# Patient Record
Sex: MALE | Race: WHITE | NOT HISPANIC OR LATINO | Employment: OTHER | URBAN - METROPOLITAN AREA
[De-identification: names, ages, dates, MRNs, and addresses within clinical notes are randomized per-mention and may not be internally consistent; named-entity substitution may affect disease eponyms.]

---

## 2017-01-11 ENCOUNTER — TELEPHONE (OUTPATIENT)
Dept: FAMILY MEDICINE | Facility: CLINIC | Age: 62
End: 2017-01-11

## 2017-01-11 NOTE — TELEPHONE ENCOUNTER
Patient is much improved and is lifting overhead with no problems and wants that restriction removed from his workability

## 2017-02-21 DIAGNOSIS — M17.0 PRIMARY OSTEOARTHRITIS OF BOTH KNEES: Primary | ICD-10-CM

## 2017-02-21 RX ORDER — CELECOXIB 200 MG/1
200 CAPSULE ORAL 2 TIMES DAILY PRN
Qty: 60 CAPSULE | Refills: 1 | Status: SHIPPED | OUTPATIENT
Start: 2017-02-21 | End: 2018-05-24

## 2017-02-22 DIAGNOSIS — M54.16 LUMBAR RADICULOPATHY: ICD-10-CM

## 2017-02-22 DIAGNOSIS — M54.12 CERVICAL RADICULITIS: ICD-10-CM

## 2017-02-22 RX ORDER — DULOXETIN HYDROCHLORIDE 60 MG/1
60 CAPSULE, DELAYED RELEASE ORAL DAILY
Qty: 90 CAPSULE | Refills: 1 | Status: CANCELLED | OUTPATIENT
Start: 2017-02-22

## 2017-02-22 NOTE — TELEPHONE ENCOUNTER
Medication listed as discontinued in Epic due to cost of medication.  Refill request states last fill was 3/8/2015.

## 2017-02-23 NOTE — TELEPHONE ENCOUNTER
Left message on pharmacy vm that med was d/hien in 2015 due to cost.  If pt wants it he should contact clinic.  Advised it is possible pharmacy sent in air since we did just refill a celebrex.  Betty Díaz RN

## 2017-03-02 DIAGNOSIS — M54.16 LUMBAR RADICULOPATHY: ICD-10-CM

## 2017-03-28 DIAGNOSIS — K21.00 GASTROESOPHAGEAL REFLUX DISEASE WITH ESOPHAGITIS: ICD-10-CM

## 2017-03-29 RX ORDER — OMEPRAZOLE 40 MG/1
CAPSULE, DELAYED RELEASE ORAL
Qty: 90 CAPSULE | Refills: 2 | Status: SHIPPED | OUTPATIENT
Start: 2017-03-29 | End: 2018-01-25

## 2017-04-14 DIAGNOSIS — I10 ESSENTIAL HYPERTENSION WITH GOAL BLOOD PRESSURE LESS THAN 140/90: ICD-10-CM

## 2017-04-14 RX ORDER — LOSARTAN POTASSIUM 100 MG/1
100 TABLET ORAL DAILY
Qty: 30 TABLET | Refills: 0 | Status: SHIPPED | OUTPATIENT
Start: 2017-04-14 | End: 2017-06-17

## 2017-06-06 ENCOUNTER — OFFICE VISIT (OUTPATIENT)
Dept: FAMILY MEDICINE | Facility: CLINIC | Age: 62
End: 2017-06-06
Payer: COMMERCIAL

## 2017-06-06 VITALS
BODY MASS INDEX: 29.8 KG/M2 | WEIGHT: 220 LBS | HEIGHT: 72 IN | HEART RATE: 109 BPM | DIASTOLIC BLOOD PRESSURE: 85 MMHG | SYSTOLIC BLOOD PRESSURE: 122 MMHG | TEMPERATURE: 97.3 F

## 2017-06-06 DIAGNOSIS — M54.16 LUMBAR RADICULOPATHY: Primary | ICD-10-CM

## 2017-06-06 DIAGNOSIS — M17.32 POST-TRAUMATIC OSTEOARTHRITIS OF LEFT KNEE: ICD-10-CM

## 2017-06-06 DIAGNOSIS — H90.3 SENSORY HEARING LOSS, BILATERAL: ICD-10-CM

## 2017-06-06 PROCEDURE — 99213 OFFICE O/P EST LOW 20 MIN: CPT | Performed by: FAMILY MEDICINE

## 2017-06-06 NOTE — MR AVS SNAPSHOT
After Visit Summary   6/6/2017    Enrico Leos    MRN: [id number]           Patient Information     Date Of Birth          1955        Visit Information        Provider Department      6/6/2017 11:30 AM Josh Carter MD [facility]        Today's Diagnoses     Lumbar radiculopathy    -  1    Post-traumatic osteoarthritis of left knee        Sensory hearing loss, bilateral           Follow-ups after your visit        Who to contact     If you have questions or need follow up information about today's clinic visit or your schedule please contact [facility] directly at [phone].  Normal or non-critical lab and imaging results will be communicated to you by [employer], letter or phone within 4 business days after the clinic has received the results. If you do not hear from us within 7 days, please contact the clinic through [employer] or phone. If you have a critical or abnormal lab result, we will notify you by phone as soon as possible.  Submit refill requests through [address] or call your pharmacy and they will forward the refill request to us. Please allow 3 business days for your refill to be completed.          Additional Information About Your Visit        MyChart Information     [address] gives you secure access to your electronic health record. If you see a primary care provider, you can also send messages to your care team and make appointments. If you have questions, please call your primary care clinic.  If you do not have a primary care provider, please call [phone] and they will assist you.        Care EveryWhere ID     This is your Care EveryWhere ID. This could be used by other organizations to access your [facility] medical records  [id number]        Your Vitals Were     Pulse Temperature Height BMI (Body Mass Index)          109 97.3  F (36.3  C) (Oral) 6' (1.829 m) 29.84 kg/m2         Blood Pressure from Last 3 Encounters:   06/06/17 122/85    11/29/16 137/88   09/20/16 120/85    Weight from Last 3 Encounters:   06/06/17 220 lb (99.8 kg)   11/29/16 231 lb 12.8 oz (105.1 kg)   11/28/16 224 lb (101.6 kg)              Today, you had the following     No orders found for display       Primary Care Provider Office Phone # Fax #    Josh Carter [phone]       [facility] [address] [address] [address]        Thank you!     Thank you for choosing [facility]  for your care. Our goal is always to provide you with excellent care. Hearing back from our patients is one way we can continue to improve our services. Please take a few minutes to complete the written survey that you may receive in the mail after your visit with us. Thank you!             Your Updated Medication List - Protect others around you: Learn how to safely use, store and throw away your medicines at www.disposemymeds.org.          This list is accurate as of: 6/6/17 11:41 AM.  Always use your most recent med list.                   Brand Name Dispense Instructions for use    aspirin 81 MG tablet      Take 1 tablet by mouth daily.       atorvastatin 40 MG tablet    LIPITOR    90 tablet    Take 1 tablet (40 mg) by mouth daily       celecoxib 200 MG capsule    celeBREX    60 capsule    Take 1 capsule (200 mg) by mouth 2 times daily as needed for moderate pain       losartan 100 MG tablet    COZAAR    30 tablet    Take 1 tablet (100 mg) by mouth daily Patient needs provider appointment for more refills       omeprazole 40 MG capsule    priLOSEC    90 capsule    TAKE ONE CAPSULE BY MOUTH ONE TIME DAILY       sertraline 50 MG tablet    ZOLOFT    90 tablet    Take 1 tablet (50 mg) by mouth daily

## 2017-06-06 NOTE — LETTER
[facility]  [address] [facility] [address] [phone]          2017    RE:  Enrico Leos                                                                                                                                                       [address] [address]            To whom it may concern:    Enrico Leos is under my professional care for    Lumbar radiculopathy  Post-traumatic osteoarthritis of left knee  Sensory hearing loss, bilateral He  may return to work with the followin pound weight restriction.  This is a one year restriction    Sincerely,        Josh Carter MD

## 2017-06-06 NOTE — NURSING NOTE
Chief Complaint   Patient presents with     Back Pain     on going low back pain        Initial BP (!) 131/95  Pulse 109  Temp 97.3  F (36.3  C) (Oral)  Ht 6' (1.829 m)  Wt 220 lb (99.8 kg)  BMI 29.84 kg/m2 Estimated body mass index is 29.84 kg/(m^2) as calculated from the following:    Height as of this encounter: 6' (1.829 m).    Weight as of this encounter: 220 lb (99.8 kg).  Medication Reconciliation: complete  Hector Ferrell, CMA

## 2017-06-06 NOTE — PROGRESS NOTES
SUBJECTIVE: patient has had a long term history of back pain with laminectomy , discectomy 1986, multiple epidural steroid injections and a radiofrequency ablation.  He has had multiple physical therapies and has a neuro stimulator.  He also has a significant hearing loss and osteoarthritis of the knees.   He has a total of over 20% disability with all of these ailments.  He has been employed by [employer] as a  and can not return to his job until he is 100% according to [employer].  Presently, patient has continuous back pain that is aggravated: bending, turning, lifting, sitting, standing and walking.  Knee pain is aggravated by the same things.  OBJECTIVE:  no apparent distress  /85  Pulse 109  Temp 97.3  F (36.3  C) (Oral)  Ht 6' (1.829 m)  Wt 220 lb (99.8 kg)  BMI 29.84 kg/m2   slr 90degrees  Decreased range of motion extension and flexion.  Back is symmetric   Last MRI showed post op changes and degenerative changes.      ICD-10-CM    1. Lumbar radiculopathy M54.16    2. Post-traumatic osteoarthritis of left knee M17.32    3. Sensory hearing loss, bilateral H90.3     PLAN:no more than 20 pounds of lifting

## 2017-06-17 DIAGNOSIS — I10 ESSENTIAL HYPERTENSION WITH GOAL BLOOD PRESSURE LESS THAN 140/90: ICD-10-CM

## 2017-06-19 RX ORDER — LOSARTAN POTASSIUM 100 MG/1
100 TABLET ORAL DAILY
Qty: 30 TABLET | Refills: 5 | Status: SHIPPED | OUTPATIENT
Start: 2017-06-19 | End: 2018-01-17

## 2017-08-15 DIAGNOSIS — M54.16 LUMBAR RADICULOPATHY: ICD-10-CM

## 2017-09-20 ENCOUNTER — DOCUMENTATION ONLY (OUTPATIENT)
Dept: LAB | Facility: CLINIC | Age: 62
End: 2017-09-20

## 2017-09-20 DIAGNOSIS — E78.5 HYPERLIPIDEMIA LDL GOAL <130: Primary | ICD-10-CM

## 2017-09-20 DIAGNOSIS — I10 HYPERTENSION GOAL BP (BLOOD PRESSURE) < 140/90: ICD-10-CM

## 2017-09-21 DIAGNOSIS — E78.5 HYPERLIPIDEMIA LDL GOAL <130: ICD-10-CM

## 2017-09-21 DIAGNOSIS — E78.2 MIXED HYPERLIPIDEMIA: ICD-10-CM

## 2017-09-21 DIAGNOSIS — I10 HYPERTENSION GOAL BP (BLOOD PRESSURE) < 140/90: ICD-10-CM

## 2017-09-21 LAB
ANION GAP SERPL CALCULATED.3IONS-SCNC: 8 MMOL/L (ref 3–14)
BUN SERPL-MCNC: 17 MG/DL (ref 7–30)
CALCIUM SERPL-MCNC: 8.6 MG/DL (ref 8.5–10.1)
CHLORIDE SERPL-SCNC: 104 MMOL/L (ref 94–109)
CHOLEST SERPL-MCNC: 180 MG/DL
CO2 SERPL-SCNC: 26 MMOL/L (ref 20–32)
CREAT SERPL-MCNC: 1.03 MG/DL (ref 0.66–1.25)
GFR SERPL CREATININE-BSD FRML MDRD: 74 ML/MIN/1.7M2
GLUCOSE SERPL-MCNC: 108 MG/DL (ref 70–99)
HDLC SERPL-MCNC: 39 MG/DL
LDLC SERPL CALC-MCNC: 105 MG/DL
NONHDLC SERPL-MCNC: 141 MG/DL
POTASSIUM SERPL-SCNC: 4.1 MMOL/L (ref 3.4–5.3)
SODIUM SERPL-SCNC: 138 MMOL/L (ref 133–144)
TRIGL SERPL-MCNC: 181 MG/DL

## 2017-09-21 PROCEDURE — 36415 COLL VENOUS BLD VENIPUNCTURE: CPT | Performed by: FAMILY MEDICINE

## 2017-09-21 PROCEDURE — 80061 LIPID PANEL: CPT | Performed by: FAMILY MEDICINE

## 2017-09-21 PROCEDURE — 80048 BASIC METABOLIC PNL TOTAL CA: CPT | Performed by: FAMILY MEDICINE

## 2017-09-21 RX ORDER — ATORVASTATIN CALCIUM 40 MG/1
TABLET, FILM COATED ORAL
Qty: 30 TABLET | Refills: 0 | Status: SHIPPED | OUTPATIENT
Start: 2017-09-21 | End: 2017-09-28

## 2017-09-21 NOTE — PROGRESS NOTES
This patient has a lab only appointment on 9/21/2017 (today) but does not have future orders. Please review, associate diagnosis and sign pending lab orders for the upcoming appointment.  He has an appointment with Dr. Carter on 9/28/2017.    Thank you,    Nichole Oliva

## 2017-09-21 NOTE — TELEPHONE ENCOUNTER
Medication is being filled for 1 time refill only due to:  patient due for annual labs and appt. appt made for next week. 30 day supply sent.      Shannon Rubalcava RN   [facility]

## 2017-09-21 NOTE — PROGRESS NOTES
Need previsit labs-See pending orders and close encounter./Candis Jacome,         Physical 9/28/17

## 2017-09-28 ENCOUNTER — OFFICE VISIT (OUTPATIENT)
Dept: FAMILY MEDICINE | Facility: CLINIC | Age: 62
End: 2017-09-28
Payer: COMMERCIAL

## 2017-09-28 VITALS
WEIGHT: 224.8 LBS | DIASTOLIC BLOOD PRESSURE: 96 MMHG | SYSTOLIC BLOOD PRESSURE: 140 MMHG | OXYGEN SATURATION: 97 % | HEIGHT: 72 IN | HEART RATE: 85 BPM | BODY MASS INDEX: 30.45 KG/M2 | TEMPERATURE: 97.4 F

## 2017-09-28 DIAGNOSIS — I15.9 SECONDARY HYPERTENSION: ICD-10-CM

## 2017-09-28 DIAGNOSIS — Z23 NEED FOR PROPHYLACTIC VACCINATION AND INOCULATION AGAINST INFLUENZA: ICD-10-CM

## 2017-09-28 DIAGNOSIS — E78.2 MIXED HYPERLIPIDEMIA: ICD-10-CM

## 2017-09-28 DIAGNOSIS — Z00.00 ROUTINE GENERAL MEDICAL EXAMINATION AT A HEALTH CARE FACILITY: Primary | ICD-10-CM

## 2017-09-28 PROCEDURE — 99396 PREV VISIT EST AGE 40-64: CPT | Mod: 25 | Performed by: FAMILY MEDICINE

## 2017-09-28 PROCEDURE — 90471 IMMUNIZATION ADMIN: CPT | Performed by: FAMILY MEDICINE

## 2017-09-28 PROCEDURE — 90686 IIV4 VACC NO PRSV 0.5 ML IM: CPT | Performed by: FAMILY MEDICINE

## 2017-09-28 RX ORDER — HYDROCHLOROTHIAZIDE 12.5 MG/1
12.5 TABLET ORAL DAILY
Qty: 30 TABLET | Refills: 1 | Status: SHIPPED | OUTPATIENT
Start: 2017-09-28 | End: 2019-08-28

## 2017-09-28 RX ORDER — ATORVASTATIN CALCIUM 40 MG/1
TABLET, FILM COATED ORAL
Qty: 90 TABLET | Refills: 1 | Status: SHIPPED | OUTPATIENT
Start: 2017-09-28 | End: 2018-04-25

## 2017-09-28 NOTE — PROGRESS NOTES
SUBJECTIVE:   CC: Enrico Leos is an 62 year old male who presents for preventative health visit.     Physical   Annual:     Getting at least 3 servings of Calcium per day::  Yes    Bi-annual eye exam::  Yes    Dental care twice a year::  Yes    Sleep apnea or symptoms of sleep apnea::  Daytime drowsiness    Diet::  Regular (no restrictions)    Frequency of exercise::  2-3 days/week    Duration of exercise::  15-30 minutes    Taking medications regularly::  Yes    Medication side effects::  Muscle aches    Additional concerns today::  No                Today's PHQ-2 Score:   PHQ-2 ( 1999 [employer]) 9/28/2017   Q1: Little interest or pleasure in doing things 0   Q2: Feeling down, depressed or hopeless 0   PHQ-2 Score 0   Q1: Little interest or pleasure in doing things Not at all   Q2: Feeling down, depressed or hopeless Not at all   PHQ-2 Score 0       Abuse: Current or Past(Physical, Sexual or Emotional)- No  Do you feel safe in your environment - Yes    Social History   Substance Use Topics     Smoking status: Never Smoker     Smokeless tobacco: Never Used     Alcohol use Yes      Comment: occasional     Social alcohol use    Last PSA: No results found for: PSA    Reviewed orders with patient. Reviewed health maintenance and updated orders accordingly - Yes  SUBJECTIVE:  62 year oldyear old male enters with a hx of hypertension.  Pt. Has been compliant with medications and medications were reviewed.  No side effects. No chest pain or sob. Low sodium diet.    Current Outpatient Prescriptions:      atorvastatin (LIPITOR) 40 MG tablet, TAKE 1 TABLET (40 MG) BY MOUTH DAILY, Disp: 30 tablet, Rfl: 0     sertraline (ZOLOFT) 50 MG tablet, TAKE 1 TABLET (50 MG) BY MOUTH DAILY, Disp: 90 tablet, Rfl: 1     losartan (COZAAR) 100 MG tablet, Take 1 tablet (100 mg) by mouth daily, Disp: 30 tablet, Rfl: 5     omeprazole (PRILOSEC) 40 MG capsule, TAKE ONE CAPSULE BY MOUTH ONE TIME DAILY, Disp: 90 capsule, Rfl: 2     celecoxib  (CELEBREX) 200 MG capsule, Take 1 capsule (200 mg) by mouth 2 times daily as needed for moderate pain (Patient not taking: Reported on 6/6/2017), Disp: 60 capsule, Rfl: 1     aspirin 81 MG tablet, Take 1 tablet by mouth daily., Disp: , Rfl:   Past Medical History:   Diagnosis Date     Allergic rhinitis, cause unspecified     Allergic rhinitis     Chronic fatigue 9/9/2011     Degenerative joint disease      Hypertension      Lumbago      Other and unspecified hyperlipidemia      Pure hypercholesterolemia      Orders Only on 09/21/2017   Component Date Value Ref Range Status     Sodium 09/21/2017 138  133 - 144 mmol/L Final     Potassium 09/21/2017 4.1  3.4 - 5.3 mmol/L Final     Chloride 09/21/2017 104  94 - 109 mmol/L Final     Carbon Dioxide 09/21/2017 26  20 - 32 mmol/L Final     Anion Gap 09/21/2017 8  3 - 14 mmol/L Final     Glucose 09/21/2017 108* 70 - 99 mg/dL Final    Fasting specimen     Urea Nitrogen 09/21/2017 17  7 - 30 mg/dL Final     Creatinine 09/21/2017 1.03  0.66 - 1.25 mg/dL Final     GFR Estimate 09/21/2017 74  >60 mL/min/1.7m2 Final    Non  GFR Calc     GFR Estimate If Black 09/21/2017 89  >60 mL/min/1.7m2 Final    African American GFR Calc     Calcium 09/21/2017 8.6  8.5 - 10.1 mg/dL Final     Cholesterol 09/21/2017 180  <200 mg/dL Final     Triglycerides 09/21/2017 181* <150 mg/dL Final    Comment: Borderline high:  150-199 mg/dl  High:             200-499 mg/dl  Very high:       >499 mg/dl  Fasting specimen       HDL Cholesterol 09/21/2017 39* >39 mg/dL Final     LDL Cholesterol Calculated 09/21/2017 105* <100 mg/dL Final    Comment: Above desirable:  100-129 mg/dl  Borderline High:  130-159 mg/dL  High:             160-189 mg/dL  Very high:       >189 mg/dl       Non HDL Cholesterol 09/21/2017 141* <130 mg/dL Final    Comment: Above Desirable:  130-159 mg/dl  Borderline high:  160-189 mg/dl  High:             190-219 mg/dl  Very high:       >219 mg/dl        [facility]  maintenance  Patient Active Problem List   Diagnosis     Mixed hyperlipidemia     Lumbar radiculopathy     HTN (hypertension)     MEDIAL MENISCUS TEAR - left     OA (Osteoarthritis) of Knee - left     HYPERLIPIDEMIA LDL GOAL <130     Allergic rhinitis     Hyperlipidemia     Hypertension     Degenerative joint disease     Hypertension goal BP (blood pressure) < 140/90     Chronic fatigue     Joint ache     GERD (gastroesophageal reflux disease)     Chronic constipation     Advanced directives, counseling/discussion     Cervical radiculitis     Cervical radiculopathy     Post-traumatic osteoarthritis of left knee         OBJECTIVE:  no apparent distress  BP (!) 140/96  Pulse 85  Temp 97.4  F (36.3  C) (Oral)  Ht 6' (1.829 m)  Wt 224 lb 12.8 oz (102 kg)  SpO2 97%  BMI 30.49 kg/m2     Head: Normocephalic. No masses, lesions, tenderness or abnormalities.  Neck::Neck supple. No adenopathy. Thyroid symmetric, normal size.    Cardiovascular: negative. No lifts, heaves, or thrills. RRR. No murmurs, clicks gallops or rubs  Respiratory. Good diaphragmatic excursion. Lungs clear  Gastrointestinal:Abdomen soft, non-tender.  No masses, organomegaly    Orders Only on 09/21/2017   Component Date Value Ref Range Status     Sodium 09/21/2017 138  133 - 144 mmol/L Final     Potassium 09/21/2017 4.1  3.4 - 5.3 mmol/L Final     Chloride 09/21/2017 104  94 - 109 mmol/L Final     Carbon Dioxide 09/21/2017 26  20 - 32 mmol/L Final     Anion Gap 09/21/2017 8  3 - 14 mmol/L Final     Glucose 09/21/2017 108* 70 - 99 mg/dL Final    Fasting specimen     Urea Nitrogen 09/21/2017 17  7 - 30 mg/dL Final     Creatinine 09/21/2017 1.03  0.66 - 1.25 mg/dL Final     GFR Estimate 09/21/2017 74  >60 mL/min/1.7m2 Final    Non  GFR Calc     GFR Estimate If Black 09/21/2017 89  >60 mL/min/1.7m2 Final    African American GFR Calc     Calcium 09/21/2017 8.6  8.5 - 10.1 mg/dL Final     Cholesterol 09/21/2017 180  <200 mg/dL Final      Triglycerides 09/21/2017 181* <150 mg/dL Final    Comment: Borderline high:  150-199 mg/dl  High:             200-499 mg/dl  Very high:       >499 mg/dl  Fasting specimen       HDL Cholesterol 09/21/2017 39* >39 mg/dL Final     LDL Cholesterol Calculated 09/21/2017 105* <100 mg/dL Final    Comment: Above desirable:  100-129 mg/dl  Borderline High:  130-159 mg/dL  High:             160-189 mg/dL  Very high:       >189 mg/dl       Non HDL Cholesterol 09/21/2017 141* <130 mg/dL Final    Comment: Above Desirable:  130-159 mg/dl  Borderline high:  160-189 mg/dl  High:             190-219 mg/dl  Very high:       >219 mg/dl           ICD-10-CM    1. Routine general medical examination at a health care facility Z00.00    2. Mixed hyperlipidemia E78.2 atorvastatin (LIPITOR) 40 MG tablet   3. Secondary hypertension I15.9     PLAN: Follow up in 6 months           Reviewed and updated as needed this visit by clinical staff  Tobacco  Allergies  Meds  Med Hx  Surg Hx  Fam Hx  Soc Hx        Reviewed and updated as needed this visit by Provider          ROS:  C: NEGATIVE for fever, chills, change in weight  I: NEGATIVE for worrisome rashes, moles or lesions  E: NEGATIVE for vision changes or irritation  ENT: NEGATIVE for ear, mouth and throat problems  R: NEGATIVE for significant cough or SOB  CV: NEGATIVE for chest pain, palpitations or peripheral edema  GI: NEGATIVE for nausea, abdominal pain, heartburn, or change in bowel habits   male: negative for dysuria, hematuria, decreased urinary stream, erectile dysfunction, urethral discharge  M: NEGATIVE for significant arthralgias or myalgia  N: NEGATIVE for weakness, dizziness or paresthesias  P: NEGATIVE for changes in mood or affect    OBJECTIVE:   BP (!) 140/96  Pulse 85  Temp 97.4  F (36.3  C) (Oral)  Ht 6' (1.829 m)  Wt 224 lb 12.8 oz (102 kg)  SpO2 97%  BMI 30.49 kg/m2    EXAM:  GENERAL: healthy, alert and no distress  EYES: Eyes grossly normal to inspection,  PERRL and conjunctivae and sclerae normal  HENT: ear canals and TM's normal, nose and mouth without ulcers or lesions  NECK: no adenopathy, no asymmetry, masses, or scars and thyroid normal to palpation  RESP: lungs clear to auscultation - no rales, rhonchi or wheezes  CV: regular rate and rhythm, normal S1 S2, no S3 or S4, no murmur, click or rub, no peripheral edema and peripheral pulses strong  ABDOMEN: soft, nontender, no hepatosplenomegaly, no masses and bowel sounds normal  MS: no gross musculoskeletal defects noted, no edema  SKIN: no suspicious lesions or rashes  NEURO: Normal strength and tone, mentation intact and speech normal  PSYCH: mentation appears normal, affect normal/bright    ASSESSMENT/PLAN:       ICD-10-CM    1. Routine general medical examination at a health care facility Z00.00    2. Mixed hyperlipidemia E78.2 atorvastatin (LIPITOR) 40 MG tablet   3. Secondary hypertension I15.9        COUNSELING:   Reviewed preventive health counseling, as reflected in patient instructions       Regular exercise       Healthy diet/nutrition           reports that he has never smoked. He has never used smokeless tobacco.      Estimated body mass index is 30.49 kg/(m^2) as calculated from the following:    Height as of this encounter: 6' (1.829 m).    Weight as of this encounter: 224 lb 12.8 oz (102 kg).   Weight management plan: more excercise    Counseling Resources:  ATP IV Guidelines  Pooled Cohorts Equation Calculator  FRAX Risk Assessment  ICSI Preventive Guidelines  Dietary Guidelines for Americans, 2010  USDA's MyPlate  ASA Prophylaxis  Lung CA Screening    Josh Carter MD  [facility] [place]  Answers for HPI/ROS submitted by the patient on 9/28/2017   PHQ-2 Score: 0

## 2017-09-28 NOTE — MR AVS SNAPSHOT
After Visit Summary   9/28/2017    Enrico Leos    MRN: [id number]           Patient Information     Date Of Birth          1955        Visit Information        Provider Department      9/28/2017 10:45 AM Josh Carter MD [facility]        Today's Diagnoses     Routine general medical examination at a health care facility    -  1    Mixed hyperlipidemia        Secondary hypertension          Care Instructions      Preventive Health Recommendations  Male Ages 50 - 64    Yearly exam:             See your health care provider every year in order to  o   Review health changes.   o   Discuss preventive care.    o   Review your medicines if your doctor has prescribed any.     Have a cholesterol test every 5 years, or more frequently if you are at risk for high cholesterol/heart disease.     Have a diabetes test (fasting glucose) every three years. If you are at risk for diabetes, you should have this test more often.     Have a colonoscopy at age 50, or have a yearly FIT test (stool test). These exams will check for colon cancer.      Talk with your health care provider about whether or not a prostate cancer screening test (PSA) is right for you.    You should be tested each year for STDs (sexually transmitted diseases), if you re at risk.     Shots: Get a flu shot each year. Get a tetanus shot every 10 years.     Nutrition:    Eat at least 5 servings of fruits and vegetables daily.     Eat whole-grain bread, whole-wheat pasta and brown rice instead of white grains and rice.     Talk to your provider about Calcium and Vitamin D.     Lifestyle    Exercise for at least 150 minutes a week (30 minutes a day, 5 days a week). This will help you control your weight and prevent disease.     Limit alcohol to one drink per day.     No smoking.     Wear sunscreen to prevent skin cancer.     See your dentist every six months for an exam and cleaning.     See your eye doctor every 1 to 2  years.            Follow-ups after your visit        Who to contact     If you have questions or need follow up information about today's clinic visit or your schedule please contact [facility] directly at [phone].  Normal or non-critical lab and imaging results will be communicated to you by MyChart, letter or phone within 4 business days after the clinic has received the results. If you do not hear from us within 7 days, please contact the clinic through [employer] or phone. If you have a critical or abnormal lab result, we will notify you by phone as soon as possible.  Submit refill requests through [email or web address] or call your pharmacy and they will forward the refill request to us. Please allow 3 business days for your refill to be completed.          Additional Information About Your Visit        [employer] Information     [email or web address] gives you secure access to your electronic health record. If you see a primary care provider, you can also send messages to your care team and make appointments. If you have questions, please call your primary care clinic.  If you do not have a primary care provider, please call [phone] and they will assist you.        Care EveryWhere ID     This is your Care EveryWhere ID. This could be used by other organizations to access your [facility] medical records  [id number]        Your Vitals Were     Pulse Temperature Height Pulse Oximetry BMI (Body Mass Index)       85 97.4  F (36.3  C) (Oral) 6' (1.829 m) 97% 30.49 kg/m2        Blood Pressure from Last 3 Encounters:   09/28/17 (!) 140/96   06/06/17 122/85   11/29/16 137/88    Weight from Last 3 Encounters:   09/28/17 224 lb 12.8 oz (102 kg)   06/06/17 220 lb (99.8 kg)   11/29/16 231 lb 12.8 oz (105.1 kg)              Today, you had the following     No orders found for display         Today's Medication Changes          These changes are accurate as of: 9/28/17 11:37 AM.  If you have any questions, ask your nurse or doctor.                Start taking these medicines.        Dose/Directions    hydrochlorothiazide 12.5 MG Tabs tablet   Used for:  Secondary hypertension   Started by:  Josh Carter MD        Dose:  12.5 mg   Take 1 tablet (12.5 mg) by mouth daily   Quantity:  30 tablet   Refills:  1         These medicines have changed or have updated prescriptions.        Dose/Directions    atorvastatin 40 MG tablet   Commonly known as:  LIPITOR   This may have changed:  See the new instructions.   Used for:  Mixed hyperlipidemia   Changed by:  Josh Carter MD        TAKE 1 TABLET (40 MG) BY MOUTH DAILY   Quantity:  90 tablet   Refills:  1            Where to get your medicines      These medications were sent to [facility] [address] [facility] - [place], [place] - [address]  [address], [place] [address]     Phone:  [phone]     atorvastatin 40 MG tablet    hydrochlorothiazide 12.5 MG Tabs tablet                Primary Care Provider Office Phone # Fax #    Josh Carter [phone] [address] [address] [address]        Equal Access to Services     [facility] AH: Hadii aad ku hadasho Soomaali, waaxda luqadaha, qaybta kaalmada adeegyada, waxay idiin hayaan adeeg kharaadriana lakiko . So [facility] [phone].    ATENCIÓN: Si habla español, tiene a hamlin disposición servicios gratuitos de asistencia lingüística. [facility] [phone].    We comply with applicable federal civil rights laws and [place] laws. We do not discriminate on the basis of race, color, national origin, age, disability sex, sexual orientation or gender identity.            Thank you!     Thank you for choosing [facility]  for your care. Our goal is always to provide you with excellent care. Hearing back from our patients is one way we can continue to improve our services. Please take a few minutes to complete the written survey that you may receive in the mail after your visit with us. Thank you!              Your Updated Medication List - Protect others around you: Learn how to safely use, store and throw away your medicines at www.disposemymeds.org.          This list is accurate as of: 9/28/17 11:37 AM.  Always use your most recent med list.                   Brand Name Dispense Instructions for use Diagnosis    aspirin 81 MG tablet      Take 1 tablet by mouth daily.        atorvastatin 40 MG tablet    LIPITOR    90 tablet    TAKE 1 TABLET (40 MG) BY MOUTH DAILY    Mixed hyperlipidemia       celecoxib 200 MG capsule    celeBREX    60 capsule    Take 1 capsule (200 mg) by mouth 2 times daily as needed for moderate pain    Primary osteoarthritis of both knees       hydrochlorothiazide 12.5 MG Tabs tablet     30 tablet    Take 1 tablet (12.5 mg) by mouth daily    Secondary hypertension       losartan 100 MG tablet    COZAAR    30 tablet    Take 1 tablet (100 mg) by mouth daily    Essential hypertension with goal blood pressure less than 140/90       omeprazole 40 MG capsule    priLOSEC    90 capsule    TAKE ONE CAPSULE BY MOUTH ONE TIME DAILY    Gastroesophageal reflux disease with esophagitis       sertraline 50 MG tablet    ZOLOFT    90 tablet    TAKE 1 TABLET (50 MG) BY MOUTH DAILY    Lumbar radiculopathy

## 2017-09-28 NOTE — PROGRESS NOTES
"  SUBJECTIVE:   CC: Enrico Leos is an 62 year old male who presents for preventative health visit.     Healthy Habits:    Do you get at least three servings of calcium containing foods daily (dairy, green leafy vegetables, etc.)? {YES/NO, DAIRY INTAKE:489189::\"yes\"}    Amount of exercise or daily activities, outside of work: {AMOUNT EXERCISE:397750}    Problems taking medications regularly {Yes /No default:876907::\"No\"}    Medication side effects: {Yes /No default.:018054::\"No\"}    Have you had an eye exam in the past two years? {YESNOBLANK:411814}    Do you see a dentist twice per year? {YESNOBLANK:343364}    Do you have sleep apnea, excessive snoring or daytime drowsiness?{YESNOBLANK:501088}    {Outside tests to abstract? :120060}    {additional problems to add (Optional):146025}    Today's PHQ-2 Score:   PHQ-2 ( 1999 Pf[employer]) 11/29/2016 10/27/2015   Q1: Little interest or pleasure in doing things 0 0   Q2: Feeling down, depressed or hopeless 0 0   PHQ-2 Score 0 0     {PHQ-2 LOOK IN ASSESSMENTS :755040}  Abuse: Current or Past(Physical, Sexual or Emotional)- {YES/NO/NA:268116}  Do you feel safe in your environment - {YES/NO/NA:469742}    Social History   Substance Use Topics     Smoking status: Never Smoker     Smokeless tobacco: Never Used     Alcohol use Yes      Comment: occasional     {ETOH AUDIT:515010}    Last PSA: No results found for: PSA    Reviewed orders with patient. Reviewed health maintenance and updated orders accordingly - {Yes/No:448269::\"Yes\"}  {Chronicprobdata (Optional):762358}    Reviewed and updated as needed this visit by clinical staff         Reviewed and updated as needed this visit by Provider        {HISTORY OPTIONS (Optional):545998}    ROS:  {MALE ROS-adult preventive care package:630444::\"C: NEGATIVE for fever, chills, change in weight\",\"I: NEGATIVE for worrisome rashes, moles or lesions\",\"E: NEGATIVE for vision changes or irritation\",\"ENT: NEGATIVE for ear, mouth and throat " "problems\",\"R: NEGATIVE for significant cough or SOB\",\"CV: NEGATIVE for chest pain, palpitations or peripheral edema\",\"GI: NEGATIVE for nausea, abdominal pain, heartburn, or change in bowel habits\",\" male: negative for dysuria, hematuria, decreased urinary stream, erectile dysfunction, urethral discharge\",\"M: NEGATIVE for significant arthralgias or myalgia\",\"N: NEGATIVE for weakness, dizziness or paresthesias\",\"P: NEGATIVE for changes in mood or affect\"}    OBJECTIVE:   There were no vitals taken for this visit.  EXAM:  {Exam Choices:227162}    ASSESSMENT/PLAN:   {Diag Picklist:396886}    COUNSELING:  {MALE COUNSELING MESSAGES:947402::\"Reviewed preventive health counseling, as reflected in patient instructions\"}    {BP Counseling- Complete if BP >= 120/80  (Optional):498527}     reports that he has never smoked. He has never used smokeless tobacco.  {Tobacco Cessation -- Complete if patient is a smoker (Optional):205155}  Estimated body mass index is 29.84 kg/(m^2) as calculated from the following:    Height as of 6/6/17: 6' (1.829 m).    Weight as of 6/6/17: 220 lb (99.8 kg).   {Weight Management Plan (ACO) Complete if BMI is abnormal-  Ages 18-64  BMI >24.9.  Age 65+ with BMI <23 or >30 (Optional):583918}    Counseling Resources:  ATP IV Guidelines  Pooled Cohorts Equation Calculator  FRAX Risk Assessment  ICSI Preventive Guidelines  Dietary Guidelines for Americans, 2010  USDA's MyPlate  ASA Prophylaxis  Lung CA Screening    Josh Carter MD  [facility]  "

## 2017-09-28 NOTE — PROGRESS NOTES
Injectable Influenza Immunization Documentation    1.  Is the person to be vaccinated sick today?   No    2. Does the person to be vaccinated have an allergy to a component   of the vaccine?   No    3. Has the person to be vaccinated ever had a serious reaction   to influenza vaccine in the past?   No    4. Has the person to be vaccinated ever had Guillain-Barré syndrome?   No    Form completed by Hector Ferrell, [facility]

## 2017-10-16 ENCOUNTER — TELEPHONE (OUTPATIENT)
Dept: FAMILY MEDICINE | Facility: CLINIC | Age: 62
End: 2017-10-16

## 2017-10-16 DIAGNOSIS — J06.9 UPPER RESPIRATORY TRACT INFECTION, UNSPECIFIED TYPE: Primary | ICD-10-CM

## 2017-10-16 RX ORDER — DOXYCYCLINE 100 MG/1
100 CAPSULE ORAL 2 TIMES DAILY
Qty: 20 CAPSULE | Refills: 0 | Status: SHIPPED | OUTPATIENT
Start: 2017-10-16 | End: 2017-11-13

## 2017-11-01 ENCOUNTER — TELEPHONE (OUTPATIENT)
Dept: FAMILY MEDICINE | Facility: CLINIC | Age: 62
End: 2017-11-01

## 2017-11-01 NOTE — TELEPHONE ENCOUNTER
Reason for call: question  Patient called regarding (reason for call): Insurance forms for meds, procedure and treatment  Additional comments:Have they been received and completed?      Phone number to reach patient:  Other phone number:  [phone]*    Best Time:  8-5 Central time    Can we leave a detailed message on this number?  YES

## 2017-11-06 NOTE — TELEPHONE ENCOUNTER
Called Gildardo at [facility] and let him know we found the [place] paperwork, and it was abstracted before being filled out, and that we will get that form out asap.    Natalie Urrutia MA

## 2017-11-07 ENCOUNTER — DOCUMENTATION ONLY (OUTPATIENT)
Dept: ORTHOPEDICS | Facility: CLINIC | Age: 62
End: 2017-11-07

## 2017-11-07 NOTE — PROGRESS NOTES
Called Gildardo back, through [employer] and left him a message, letting him know Dr. Dimas did not prescribe Celecoxib to the patient, and that was through Dr. Carter at [facility]. I sent the in house form twice now.    Natalie Urrutia MA

## 2017-11-09 ENCOUNTER — TELEPHONE (OUTPATIENT)
Dept: FAMILY MEDICINE | Facility: CLINIC | Age: 62
End: 2017-11-09

## 2017-11-09 DIAGNOSIS — J06.9 UPPER RESPIRATORY TRACT INFECTION, UNSPECIFIED TYPE: ICD-10-CM

## 2017-11-09 NOTE — TELEPHONE ENCOUNTER
Panel Management Review      Patient has the following on his problem list:     Hypertension   Last three blood pressure readings:  BP Readings from Last 3 Encounters:   09/28/17 (!) 140/96   06/06/17 122/85   11/29/16 137/88     Blood pressure: Passed    HTN Guidelines:  Age 18-59 BP range:  Less than 140/90  Age 60-85 with Diabetes:  Less than 140/90  Age 60-85 without Diabetes:  less than 150/90        Composite cancer screening  Chart review shows that this patient is due/due soon for the following None  Summary:    Patient is due/failing the following:   BP CHECK    Action needed:   Patient needs office visit for blood pressure check due in March.    Type of outreach:    None.  Patient not yet due    Questions for provider review:    None                                                                                                                                    Hector Ferrell CMA       Chart routed to closed .

## 2017-11-13 RX ORDER — DOXYCYCLINE 100 MG/1
100 CAPSULE ORAL 2 TIMES DAILY
Qty: 20 CAPSULE | Refills: 1 | Status: SHIPPED | OUTPATIENT
Start: 2017-11-13 | End: 2018-05-24

## 2017-11-17 ENCOUNTER — TELEPHONE (OUTPATIENT)
Dept: ORTHOPEDICS | Facility: CLINIC | Age: 62
End: 2017-11-17

## 2017-11-17 NOTE — TELEPHONE ENCOUNTER
Reason for Call:  Forms    Detailed comments: Caller states paper work for medications and treatment plans were faxed to [phone] on 10-8-17, 10-26-17 and 11-9-17. Please return to fax number left Attn: Mychal    Phone Number Patient can be reached at: Other phone number:  [phone]    Best Time: 8 am to 5 pm    Can we leave a detailed message on this number? YES    Call taken on 11/17/2017 at 8:28 AM by Megan Cardoza

## 2017-12-11 ENCOUNTER — OFFICE VISIT (OUTPATIENT)
Dept: FAMILY MEDICINE | Facility: CLINIC | Age: 62
End: 2017-12-11
Payer: COMMERCIAL

## 2017-12-11 ENCOUNTER — RADIANT APPOINTMENT (OUTPATIENT)
Dept: GENERAL RADIOLOGY | Facility: CLINIC | Age: 62
End: 2017-12-11
Attending: FAMILY MEDICINE
Payer: COMMERCIAL

## 2017-12-11 VITALS
DIASTOLIC BLOOD PRESSURE: 80 MMHG | WEIGHT: 226.6 LBS | TEMPERATURE: 99 F | BODY MASS INDEX: 30.73 KG/M2 | SYSTOLIC BLOOD PRESSURE: 125 MMHG | HEART RATE: 111 BPM | OXYGEN SATURATION: 97 %

## 2017-12-11 DIAGNOSIS — R05.9 COUGH: ICD-10-CM

## 2017-12-11 DIAGNOSIS — J01.01 ACUTE RECURRENT MAXILLARY SINUSITIS: ICD-10-CM

## 2017-12-11 DIAGNOSIS — R05.9 COUGH: Primary | ICD-10-CM

## 2017-12-11 PROCEDURE — 99213 OFFICE O/P EST LOW 20 MIN: CPT | Performed by: FAMILY MEDICINE

## 2017-12-11 PROCEDURE — 71020 XR CHEST 2 VW: CPT

## 2017-12-11 RX ORDER — AMOXICILLIN 875 MG
875 TABLET ORAL 2 TIMES DAILY
Qty: 42 TABLET | Refills: 0 | Status: SHIPPED | OUTPATIENT
Start: 2017-12-11 | End: 2018-01-01

## 2017-12-11 NOTE — NURSING NOTE
Chief Complaint   Patient presents with     URI     on going, finished antibiotics and symptoms return        Initial BP (!) 146/100  Pulse 111  Temp 99  F (37.2  C) (Oral)  Wt 226 lb 9.6 oz (102.8 kg)  SpO2 97%  BMI 30.73 kg/m2 Estimated body mass index is 30.73 kg/(m^2) as calculated from the following:    Height as of 9/28/17: 6' (1.829 m).    Weight as of this encounter: 226 lb 9.6 oz (102.8 kg).  Medication Reconciliation: complete  Hector Ferrell, CMA

## 2017-12-11 NOTE — MR AVS SNAPSHOT
After Visit Summary   12/11/2017    Enrico Leos    MRN: [id number]           Patient Information     Date Of Birth          1955        Visit Information        Provider Department      12/11/2017 9:00 AM Josh Carter MD [facility]        Today's Diagnoses     Cough    -  1    Acute recurrent maxillary sinusitis           Follow-ups after your visit        Who to contact     If you have questions or need follow up information about today's clinic visit or your schedule please contact [facility] directly at [phone].  Normal or non-critical lab and imaging results will be communicated to you by MyChart, letter or phone within 4 business days after the clinic has received the results. If you do not hear from us within 7 days, please contact the clinic through [employer] or phone. If you have a critical or abnormal lab result, we will notify you by phone as soon as possible.  Submit refill requests through [employer] or call your pharmacy and they will forward the refill request to us. Please allow 3 business days for your refill to be completed.          Additional Information About Your Visit        MyChart Information     [employer] gives you secure access to your electronic health record. If you see a primary care provider, you can also send messages to your care team and make appointments. If you have questions, please call your primary care clinic.  If you do not have a primary care provider, please call [phone] and they will assist you.        Care EveryWhere ID     This is your Care EveryWhere ID. This could be used by other organizations to access your [facility] medical records  [id number]        Your Vitals Were     Pulse Temperature Pulse Oximetry BMI (Body Mass Index)          111 99  F (37.2  C) (Oral) 97% 30.73 kg/m2         Blood Pressure from Last 3 Encounters:   12/11/17 125/80   09/28/17 (!) 140/96   06/06/17 122/85    Weight from Last 3  Encounters:   12/11/17 226 lb 9.6 oz (102.8 kg)   09/28/17 224 lb 12.8 oz (102 kg)   06/06/17 220 lb (99.8 kg)                 Today's Medication Changes          These changes are accurate as of: 12/11/17  9:31 AM.  If you have any questions, ask your nurse or doctor.               Start taking these medicines.        Dose/Directions    amoxicillin 875 MG tablet   Commonly known as:  AMOXIL   Used for:  Cough   Started by:  Josh Carter MD        Dose:  875 mg   Take 1 tablet (875 mg) by mouth 2 times daily for 21 days   Quantity:  42 tablet   Refills:  0            Where to get your medicines      These medications were sent to [place] [address] [facility] [place] - [address]  [address] [place] [address]     Phone:  [phone]     amoxicillin 875 MG tablet                Primary Care Provider Office Phone # Fax #    Josh Carter [phone] [address] [facility] [address]        Equal Access to Services     [facility]: Hadii aad ku hadasho Soomaali, waaxda luqadaha, qaybta kaalmada adeegyada, waxay idiin hayaan jimmy major . So [facility] [phone].    ATENCIÓN: Si habla español, tiene a hamlin disposición servicios gratuitos de asistencia lingüística. Llame al [phone].    We comply with applicable federal civil rights laws and [place] laws. We do not discriminate on the basis of race, color, national origin, age, disability, sex, sexual orientation, or gender identity.            Thank you!     Thank you for choosing [facility]  for your care. Our goal is always to provide you with excellent care. Hearing back from our patients is one way we can continue to improve our services. Please take a few minutes to complete the written survey that you may receive in the mail after your visit with us. Thank you!             Your Updated Medication List - Protect others around you: Learn how to safely use, store and throw away your  medicines at www.disposemymeds.org.          This list is accurate as of: 12/11/17  9:31 AM.  Always use your most recent med list.                   Brand Name Dispense Instructions for use Diagnosis    amoxicillin 875 MG tablet    AMOXIL    42 tablet    Take 1 tablet (875 mg) by mouth 2 times daily for 21 days    Cough       aspirin 81 MG tablet      Take 1 tablet by mouth daily.        atorvastatin 40 MG tablet    LIPITOR    90 tablet    TAKE 1 TABLET (40 MG) BY MOUTH DAILY    Mixed hyperlipidemia       celecoxib 200 MG capsule    celeBREX    60 capsule    Take 1 capsule (200 mg) by mouth 2 times daily as needed for moderate pain    Primary osteoarthritis of both knees       doxycycline 100 MG capsule    VIBRAMYCIN    20 capsule    Take 1 capsule (100 mg) by mouth 2 times daily    Upper respiratory tract infection, unspecified type       hydrochlorothiazide 12.5 MG Tabs tablet     30 tablet    Take 1 tablet (12.5 mg) by mouth daily    Secondary hypertension       losartan 100 MG tablet    COZAAR    30 tablet    Take 1 tablet (100 mg) by mouth daily    Essential hypertension with goal blood pressure less than 140/90       omeprazole 40 MG capsule    priLOSEC    90 capsule    TAKE ONE CAPSULE BY MOUTH ONE TIME DAILY    Gastroesophageal reflux disease with esophagitis       sertraline 50 MG tablet    ZOLOFT    90 tablet    TAKE 1 TABLET (50 MG) BY MOUTH DAILY    Lumbar radiculopathy

## 2017-12-11 NOTE — PROGRESS NOTES
SUBJECTIVE:  62 year old.The patient has a history of cough .  This started 3 months ago.  quality productive. He has been on two courses of Doxycylcine Associated symptoms are hoarse voice.  Brought on by all day .  Better with doxicyline. ROS slight fever. No bloddy sputum      Reviewed health maintenance  Patient Active Problem List   Diagnosis     Mixed hyperlipidemia     Lumbar radiculopathy     HTN (hypertension)     MEDIAL MENISCUS TEAR - left     OA (Osteoarthritis) of Knee - left     HYPERLIPIDEMIA LDL GOAL <130     Allergic rhinitis     Hyperlipidemia     Hypertension     Degenerative joint disease     Hypertension goal BP (blood pressure) < 140/90     Chronic fatigue     Joint ache     GERD (gastroesophageal reflux disease)     Chronic constipation     Advanced directives, counseling/discussion     Cervical radiculitis     Cervical radiculopathy     Post-traumatic osteoarthritis of left knee     Past Medical History:   Diagnosis Date     Allergic rhinitis, cause unspecified     Allergic rhinitis     Chronic fatigue 9/9/2011     Degenerative joint disease      Hypertension      Lumbago      Other and unspecified hyperlipidemia      Pure hypercholesterolemia        OBJECTIVE:  no apparent distress  /80  Pulse 111  Temp 99  F (37.2  C) (Oral)  Wt 226 lb 9.6 oz (102.8 kg)  SpO2 97%  BMI 30.73 kg/m2    LUNGS:  CTA B/L, no wheezing or crackles.   Cardiovascular: negative, PMI normal. No lifts, heaves, or thrills. RRR. No murmurs, clicks gallops or rub   Gastrointestinal: Abdomen soft, non-tender. BS normal. No masses, organomegaly       ICD-10-CM    1. Cough R05 XR Chest 2 Views     amoxicillin (AMOXIL) 875 MG tablet    PLAN: add steroid nasal spray

## 2018-01-17 DIAGNOSIS — I10 ESSENTIAL HYPERTENSION WITH GOAL BLOOD PRESSURE LESS THAN 140/90: ICD-10-CM

## 2018-01-17 RX ORDER — LOSARTAN POTASSIUM 100 MG/1
TABLET ORAL
Qty: 30 TABLET | Refills: 1 | Status: SHIPPED | OUTPATIENT
Start: 2018-01-17 | End: 2018-03-13

## 2018-01-25 DIAGNOSIS — K21.00 GASTROESOPHAGEAL REFLUX DISEASE WITH ESOPHAGITIS: ICD-10-CM

## 2018-01-25 RX ORDER — OMEPRAZOLE 40 MG/1
CAPSULE, DELAYED RELEASE ORAL
Qty: 90 CAPSULE | Refills: 2 | Status: SHIPPED | OUTPATIENT
Start: 2018-01-25 | End: 2019-03-29

## 2018-02-19 DIAGNOSIS — M54.16 LUMBAR RADICULOPATHY: ICD-10-CM

## 2018-04-25 DIAGNOSIS — E78.2 MIXED HYPERLIPIDEMIA: ICD-10-CM

## 2018-04-25 RX ORDER — ATORVASTATIN CALCIUM 40 MG/1
TABLET, FILM COATED ORAL
Qty: 90 TABLET | Refills: 1 | Status: SHIPPED | OUTPATIENT
Start: 2018-04-25 | End: 2018-10-25

## 2018-04-25 NOTE — TELEPHONE ENCOUNTER
Rx refilled per Nichole RN refill protocol.    TC, patient due for:  6 month BP OV with PCP and   Health Maintenance Due   Topic Date Due     HIV SCREEN (SYSTEM ASSIGNED)  09/15/1975     BMP Q6 MOS  03/21/2018       Sarina Keith RN

## 2018-04-25 NOTE — LETTER
April 26, 2018    Enrico Leos  [address] [facility] [address]    Dear Enrico,       We recently received a refill request for atorvastatin (LIPITOR) 40 MG tablet.  We have refilled this for a one time 90 day supply only because you are due for a:    Cholesterol office visit and Fasting lab appointment      Please schedule this lab appointment 4-5 days prior to the office visit.     Please call at your earliest convenience so that there will not be a delay with your future refills.          Thank you,   Your [facility] Team/ks  [phone]

## 2018-05-15 ENCOUNTER — DOCUMENTATION ONLY (OUTPATIENT)
Dept: LAB | Facility: CLINIC | Age: 63
End: 2018-05-15

## 2018-05-15 DIAGNOSIS — E78.2 MIXED HYPERLIPIDEMIA: Primary | ICD-10-CM

## 2018-05-15 DIAGNOSIS — I10 HYPERTENSION GOAL BP (BLOOD PRESSURE) < 140/90: ICD-10-CM

## 2018-05-15 DIAGNOSIS — E78.00 PURE HYPERCHOLESTEROLEMIA: ICD-10-CM

## 2018-05-15 NOTE — PROGRESS NOTES
.Please place or confirm orders for upcoming lab appointment on 5/17/18.    Thank You  [place]

## 2018-05-15 NOTE — PROGRESS NOTES
Please review lab orders sign and close encounter. Tenisha Lange MA/TC    Med check/cholesterol appt 5/24/18

## 2018-05-17 DIAGNOSIS — E78.00 PURE HYPERCHOLESTEROLEMIA: ICD-10-CM

## 2018-05-17 DIAGNOSIS — E78.2 MIXED HYPERLIPIDEMIA: ICD-10-CM

## 2018-05-17 DIAGNOSIS — I10 HYPERTENSION GOAL BP (BLOOD PRESSURE) < 140/90: ICD-10-CM

## 2018-05-17 LAB
ANION GAP SERPL CALCULATED.3IONS-SCNC: 5 MMOL/L (ref 3–14)
BUN SERPL-MCNC: 13 MG/DL (ref 7–30)
CALCIUM SERPL-MCNC: 9 MG/DL (ref 8.5–10.1)
CHLORIDE SERPL-SCNC: 106 MMOL/L (ref 94–109)
CHOLEST SERPL-MCNC: 160 MG/DL
CO2 SERPL-SCNC: 29 MMOL/L (ref 20–32)
CREAT SERPL-MCNC: 1.07 MG/DL (ref 0.66–1.25)
GFR SERPL CREATININE-BSD FRML MDRD: 70 ML/MIN/1.7M2
GLUCOSE SERPL-MCNC: 126 MG/DL (ref 70–99)
HDLC SERPL-MCNC: 40 MG/DL
LDLC SERPL CALC-MCNC: 90 MG/DL
NONHDLC SERPL-MCNC: 120 MG/DL
POTASSIUM SERPL-SCNC: 4.2 MMOL/L (ref 3.4–5.3)
SODIUM SERPL-SCNC: 140 MMOL/L (ref 133–144)
TRIGL SERPL-MCNC: 148 MG/DL

## 2018-05-17 PROCEDURE — 80048 BASIC METABOLIC PNL TOTAL CA: CPT | Performed by: FAMILY MEDICINE

## 2018-05-17 PROCEDURE — 80061 LIPID PANEL: CPT | Performed by: FAMILY MEDICINE

## 2018-05-17 PROCEDURE — 36415 COLL VENOUS BLD VENIPUNCTURE: CPT | Performed by: FAMILY MEDICINE

## 2018-05-20 DIAGNOSIS — M54.16 LUMBAR RADICULOPATHY: ICD-10-CM

## 2018-05-22 NOTE — TELEPHONE ENCOUNTER
Sertraline refill request  Dx: lumbar radiculopathy  Last refill: 2/19/18 #90 per an RN  Saw Dr. Josh Carter: 12/11/17 for URI.   Had physical on 9/28/17

## 2018-05-24 ENCOUNTER — OFFICE VISIT (OUTPATIENT)
Dept: FAMILY MEDICINE | Facility: CLINIC | Age: 63
End: 2018-05-24
Payer: COMMERCIAL

## 2018-05-24 VITALS
OXYGEN SATURATION: 98 % | TEMPERATURE: 97.2 F | WEIGHT: 228 LBS | BODY MASS INDEX: 30.88 KG/M2 | RESPIRATION RATE: 16 BRPM | DIASTOLIC BLOOD PRESSURE: 85 MMHG | HEIGHT: 72 IN | HEART RATE: 90 BPM | SYSTOLIC BLOOD PRESSURE: 125 MMHG

## 2018-05-24 DIAGNOSIS — E78.00 PURE HYPERCHOLESTEROLEMIA: ICD-10-CM

## 2018-05-24 DIAGNOSIS — M54.16 LUMBAR RADICULOPATHY: ICD-10-CM

## 2018-05-24 DIAGNOSIS — I10 HYPERTENSION, UNSPECIFIED TYPE: Primary | ICD-10-CM

## 2018-05-24 PROCEDURE — 99213 OFFICE O/P EST LOW 20 MIN: CPT | Performed by: FAMILY MEDICINE

## 2018-05-24 ASSESSMENT — PAIN SCALES - GENERAL: PAINLEVEL: NO PAIN (0)

## 2018-05-24 NOTE — MR AVS SNAPSHOT
After Visit Summary   5/24/2018    Enrico Leos    MRN: [id number]           Patient Information     Date Of Birth          1955        Visit Information        Provider Department      5/24/2018 8:30 AM Josh Carter MD [facility]        Today's Diagnoses     Hypertension, unspecified type    -  1    Pure hypercholesterolemia           Follow-ups after your visit        Who to contact     If you have questions or need follow up information about today's clinic visit or your schedule please contact [facility] directly at [phone].  Normal or non-critical lab and imaging results will be communicated to you by MyChart, letter or phone within 4 business days after the clinic has received the results. If you do not hear from us within 7 days, please contact the clinic through [employer] or phone. If you have a critical or abnormal lab result, we will notify you by phone as soon as possible.  Submit refill requests through [employer] or call your pharmacy and they will forward the refill request to us. Please allow 3 business days for your refill to be completed.          Additional Information About Your Visit        MyChart Information     [employer] gives you secure access to your electronic health record. If you see a primary care provider, you can also send messages to your care team and make appointments. If you have questions, please call your primary care clinic.  If you do not have a primary care provider, please call [phone] and they will assist you.        Care EveryWhere ID     This is your Care EveryWhere ID. This could be used by other organizations to access your [facility] medical records  [id number]        Your Vitals Were     Pulse Temperature Respirations Height Pulse Oximetry BMI (Body Mass Index)    90 97.2  F (36.2  C) (Oral) 16 6' (1.829 m) 98% 30.92 kg/m2       Blood Pressure from Last 3 Encounters:   05/24/18 125/85   12/11/17 125/80    09/28/17 (!) 140/96    Weight from Last 3 Encounters:   05/24/18 228 lb (103.4 kg)   12/11/17 226 lb 9.6 oz (102.8 kg)   09/28/17 224 lb 12.8 oz (102 kg)              Today, you had the following     No orders found for display       Primary Care Provider Office Phone # Fax #    Josh Carter [phone] [address] [facility] [address]        Equal Access to Services     KHURRAM MASON : Hadii aad ku hadasho Soomaali, waaxda luqadaha, qaybta kaalmada adeegyada, waxay idiin hayaan adeeg khlazaro major . So [facility] [phone].    ATENCIÓN: Si habla español, tiene a hamlin disposición servicios gratuitos de asistencia lingüística. Ll[facility] [phone].    We comply with applicable federal civil rights laws and [place] laws. We do not discriminate on the basis of race, color, national origin, age, disability, sex, sexual orientation, or gender identity.            Thank you!     Thank you for choosing [facility]  for your care. Our goal is always to provide you with excellent care. Hearing back from our patients is one way we can continue to improve our services. Please take a few minutes to complete the written survey that you may receive in the mail after your visit with us. Thank you!             Your Updated Medication List - Protect others around you: Learn how to safely use, store and throw away your medicines at www.disposemymeds.org.          This list is accurate as of 5/24/18  9:05 AM.  Always use your most recent med list.                   Brand Name Dispense Instructions for use Diagnosis    aspirin 81 MG tablet      Take 1 tablet by mouth daily.        atorvastatin 40 MG tablet    LIPITOR    90 tablet    TAKE 1 TABLET BY MOUTH EVERY DAY    Mixed hyperlipidemia       hydrochlorothiazide 12.5 MG Tabs tablet     30 tablet    Take 1 tablet (12.5 mg) by mouth daily    Secondary hypertension       losartan 100 MG tablet    COZAAR    90 tablet    TAKE 1 TABLET BY  MOUTH DAILY    Essential hypertension with goal blood pressure less than 140/90       omeprazole 40 MG capsule    priLOSEC    90 capsule    TAKE ONE CAPSULE BY MOUTH ONE TIME DAILY    Gastroesophageal reflux disease with esophagitis       sertraline 50 MG tablet    ZOLOFT    90 tablet    TAKE 1 TABLET (50 MG) BY MOUTH DAILY    Lumbar radiculopathy

## 2018-05-24 NOTE — NURSING NOTE
Chief Complaint   Patient presents with     Lipids       Initial /86  Pulse 90  Temp 97.2  F (36.2  C) (Oral)  Resp 16  Ht 6' (1.829 m)  Wt 228 lb (103.4 kg)  SpO2 98%  BMI 30.92 kg/m2 Estimated body mass index is 30.92 kg/(m^2) as calculated from the following:    Height as of this encounter: 6' (1.829 m).    Weight as of this encounter: 228 lb (103.4 kg).  Medication Reconciliation: complete  Macy Perkins M.A.

## 2018-05-24 NOTE — PROGRESS NOTES
SUBJECTIVE:  62 year oldyear old male enters with a hx of hypertension.  Pt. Has been compliant with medications and medications were reviewed.  No side effects. No chest pain or sob. Low sodium diet.    Current Outpatient Prescriptions:      aspirin 81 MG tablet, Take 1 tablet by mouth daily., Disp: , Rfl:      atorvastatin (LIPITOR) 40 MG tablet, TAKE 1 TABLET BY MOUTH EVERY DAY, Disp: 90 tablet, Rfl: 1     hydrochlorothiazide 12.5 MG TABS tablet, Take 1 tablet (12.5 mg) by mouth daily (Patient not taking: Reported on 5/24/2018), Disp: 30 tablet, Rfl: 1     losartan (COZAAR) 100 MG tablet, TAKE 1 TABLET BY MOUTH DAILY, Disp: 90 tablet, Rfl: 0     omeprazole (PRILOSEC) 40 MG capsule, TAKE ONE CAPSULE BY MOUTH ONE TIME DAILY, Disp: 90 capsule, Rfl: 2     sertraline (ZOLOFT) 50 MG tablet, TAKE 1 TABLET (50 MG) BY MOUTH DAILY, Disp: 90 tablet, Rfl: 1  Past Medical History:   Diagnosis Date     Allergic rhinitis, cause unspecified     Allergic rhinitis     Chronic fatigue 9/9/2011     Degenerative joint disease      Hypertension      Lumbago      Other and unspecified hyperlipidemia      Pure hypercholesterolemia      Orders Only on 05/17/2018   Component Date Value Ref Range Status     Sodium 05/17/2018 140  133 - 144 mmol/L Final     Potassium 05/17/2018 4.2  3.4 - 5.3 mmol/L Final     Chloride 05/17/2018 106  94 - 109 mmol/L Final     Carbon Dioxide 05/17/2018 29  20 - 32 mmol/L Final     Anion Gap 05/17/2018 5  3 - 14 mmol/L Final     Glucose 05/17/2018 126* 70 - 99 mg/dL Final    Fasting specimen     Urea Nitrogen 05/17/2018 13  7 - 30 mg/dL Final     Creatinine 05/17/2018 1.07  0.66 - 1.25 mg/dL Final     GFR Estimate 05/17/2018 70  >60 mL/min/1.7m2 Final    Non  GFR Calc     GFR Estimate If Black 05/17/2018 85  >60 mL/min/1.7m2 Final    African American GFR Calc     Calcium 05/17/2018 9.0  8.5 - 10.1 mg/dL Final     Cholesterol 05/17/2018 160  <200 mg/dL Final     Triglycerides 05/17/2018 148   <150 mg/dL Final    Fasting specimen     HDL Cholesterol 05/17/2018 40  >39 mg/dL Final     LDL Cholesterol Calculated 05/17/2018 90  <100 mg/dL Final    Desirable:       <100 mg/dl     Non HDL Cholesterol 05/17/2018 120  <130 mg/dL Final      Reviewed health maintenance  Patient Active Problem List   Diagnosis     Mixed hyperlipidemia     Lumbar radiculopathy     HTN (hypertension)     MEDIAL MENISCUS TEAR - left     OA (Osteoarthritis) of Knee - left     HYPERLIPIDEMIA LDL GOAL <130     Allergic rhinitis     Hyperlipidemia     Hypertension     Degenerative joint disease     Hypertension goal BP (blood pressure) < 140/90     Chronic fatigue     Joint ache     GERD (gastroesophageal reflux disease)     Chronic constipation     Advanced directives, counseling/discussion     Cervical radiculitis     Cervical radiculopathy     Post-traumatic osteoarthritis of left knee         OBJECTIVE:  no apparent distress  /85  Pulse 90  Temp 97.2  F (36.2  C) (Oral)  Resp 16  Ht 6' (1.829 m)  Wt 228 lb (103.4 kg)  SpO2 98%  BMI 30.92 kg/m2     Head: Normocephalic. No masses, lesions, tenderness or abnormalities.  Neck::Neck supple. No adenopathy. Thyroid symmetric, normal size.    Cardiovascular: negative. No lifts, heaves, or thrills. RRR. No murmurs, clicks gallops or rubs  Respiratory. Good diaphragmatic excursion. Lungs clear  Gastrointestinal:Abdomen soft, non-tender.  No masses, organomegaly    Orders Only on 05/17/2018   Component Date Value Ref Range Status     Sodium 05/17/2018 140  133 - 144 mmol/L Final     Potassium 05/17/2018 4.2  3.4 - 5.3 mmol/L Final     Chloride 05/17/2018 106  94 - 109 mmol/L Final     Carbon Dioxide 05/17/2018 29  20 - 32 mmol/L Final     Anion Gap 05/17/2018 5  3 - 14 mmol/L Final     Glucose 05/17/2018 126* 70 - 99 mg/dL Final    Fasting specimen     Urea Nitrogen 05/17/2018 13  7 - 30 mg/dL Final     Creatinine 05/17/2018 1.07  0.66 - 1.25 mg/dL Final     GFR Estimate 05/17/2018  70  >60 mL/min/1.7m2 Final    Non  GFR Calc     GFR Estimate If Black 05/17/2018 85  >60 mL/min/1.7m2 Final    African American GFR Calc     Calcium 05/17/2018 9.0  8.5 - 10.1 mg/dL Final     Cholesterol 05/17/2018 160  <200 mg/dL Final     Triglycerides 05/17/2018 148  <150 mg/dL Final    Fasting specimen     HDL Cholesterol 05/17/2018 40  >39 mg/dL Final     LDL Cholesterol Calculated 05/17/2018 90  <100 mg/dL Final    Desirable:       <100 mg/dl     Non HDL Cholesterol 05/17/2018 120  <130 mg/dL Final         ICD-10-CM    1. Hypertension, unspecified type I10    2. Pure hypercholesterolemia E78.00     PLAN: Follow up in 6 months           SUBJECTIVE:  62 year old enters for recheck of high cholesterol.  Pt. Has been taking med and has no side effects. Pt is following diet.  Denies chest pain and SOB.  Past Medical History:   Diagnosis Date     Allergic rhinitis, cause unspecified     Allergic rhinitis     Chronic fatigue 9/9/2011     Degenerative joint disease      Hypertension      Lumbago      Other and unspecified hyperlipidemia      Pure hypercholesterolemia      Past Surgical History:   Procedure Laterality Date     C BIOPSY SYNOVIUM KNEE JOINT  1977    Semilunar Cartilage Excision, Knee     C MONZON W/O FACETEC FORAMOT/DSKC 1/2 VRT SEG, LUMBAR  1986    Lami, Lumbar     C STOMACH SURGERY PROCEDURE UNLISTED      infant     HC KNEE SCOPE,MED/LAT MENISECTOMY  8/25/10    WORK COMP, medial      HC REPAIR INCISIONAL HERNIA,REDUCIBLE      Hernia Repair, Incisional, Bilateral     HC SACROPLASTY       REMOVAL OF SPERM DUCT(S)  1992    Vasectomy       Current Outpatient Prescriptions:      aspirin 81 MG tablet, Take 1 tablet by mouth daily., Disp: , Rfl:      atorvastatin (LIPITOR) 40 MG tablet, TAKE 1 TABLET BY MOUTH EVERY DAY, Disp: 90 tablet, Rfl: 1     hydrochlorothiazide 12.5 MG TABS tablet, Take 1 tablet (12.5 mg) by mouth daily (Patient not taking: Reported on 5/24/2018), Disp: 30 tablet, Rfl: 1      losartan (COZAAR) 100 MG tablet, TAKE 1 TABLET BY MOUTH DAILY, Disp: 90 tablet, Rfl: 0     omeprazole (PRILOSEC) 40 MG capsule, TAKE ONE CAPSULE BY MOUTH ONE TIME DAILY, Disp: 90 capsule, Rfl: 2     sertraline (ZOLOFT) 50 MG tablet, TAKE 1 TABLET (50 MG) BY MOUTH DAILY, Disp: 90 tablet, Rfl: 1  Reviewed health maintenance   Patient Active Problem List   Diagnosis     Mixed hyperlipidemia     Lumbar radiculopathy     HTN (hypertension)     MEDIAL MENISCUS TEAR - left     OA (Osteoarthritis) of Knee - left     HYPERLIPIDEMIA LDL GOAL <130     Allergic rhinitis     Hyperlipidemia     Hypertension     Degenerative joint disease     Hypertension goal BP (blood pressure) < 140/90     Chronic fatigue     Joint ache     GERD (gastroesophageal reflux disease)     Chronic constipation     Advanced directives, counseling/discussion     Cervical radiculitis     Cervical radiculopathy     Post-traumatic osteoarthritis of left knee       OBJECTIVE:  no apparent distress  /85  Pulse 90  Temp 97.2  F (36.2  C) (Oral)  Resp 16  Ht 6' (1.829 m)  Wt 228 lb (103.4 kg)  SpO2 98%  BMI 30.92 kg/m2      Exam:  Constitutional: healthy, alert and no distress  Head: Normocephalic. No masses, lesions, tenderness or abnormalities  Neck: Neck supple. No adenopathy. Thyroid symmetric, normal size,  Cardiovascular: negative, PMI normal. No lifts, heaves, or thrills. RRR. No murmurs, clicks gallops or rub  Respiratory: negative Lungs clear    Orders Only on 05/17/2018   Component Date Value Ref Range Status     Sodium 05/17/2018 140  133 - 144 mmol/L Final     Potassium 05/17/2018 4.2  3.4 - 5.3 mmol/L Final     Chloride 05/17/2018 106  94 - 109 mmol/L Final     Carbon Dioxide 05/17/2018 29  20 - 32 mmol/L Final     Anion Gap 05/17/2018 5  3 - 14 mmol/L Final     Glucose 05/17/2018 126* 70 - 99 mg/dL Final    Fasting specimen     Urea Nitrogen 05/17/2018 13  7 - 30 mg/dL Final     Creatinine 05/17/2018 1.07  0.66 - 1.25 mg/dL Final      GFR Estimate 05/17/2018 70  >60 mL/min/1.7m2 Final    Non  GFR Calc     GFR Estimate If Black 05/17/2018 85  >60 mL/min/1.7m2 Final    African American GFR Calc     Calcium 05/17/2018 9.0  8.5 - 10.1 mg/dL Final     Cholesterol 05/17/2018 160  <200 mg/dL Final     Triglycerides 05/17/2018 148  <150 mg/dL Final    Fasting specimen     HDL Cholesterol 05/17/2018 40  >39 mg/dL Final     LDL Cholesterol Calculated 05/17/2018 90  <100 mg/dL Final    Desirable:       <100 mg/dl     Non HDL Cholesterol 05/17/2018 120  <130 mg/dL Final           ICD-10-CM    1. Hypertension, unspecified type I10    2. Pure hypercholesterolemia E78.00     PLAN: Follow up in 6 months         SUBJECTIVE:    Enrico Leos is a 62 year old male seen in clinic to day for low back pain.   Has been going on for   years.        History of Present Illness:    There is ahistory of injury.  Patient has had surgery 1986 and stimulator 2015   The pain is located on the left.   Pain Quality: Sharp   The back pain radiates into the left leg.   Measures which improve the pain include no heavy lifting no bending no kneeling.   Measures which worsen the pain include standing, walking, bending, driving and changing position      NAssociated Signs and Symptoms: numbness or Tingling Yes       There is no history of abdominal pain or bowel or bladder dysfunction.no  Past Medical, social, family histories, medications, and allergies reviewed and updated    OBJECTIVE:  EXAM:   Blood pressure 125/85, pulse 90, temperature 97.2  F (36.2  C), temperature source Oral, resp. rate 16, height 6' (1.829 m), weight 228 lb (103.4 kg), SpO2 98 %.  General appearance - healthy, alert and no distress  Heart: no murmurs,Regular rate and rhythm.   Chest: is clear; no wheezes or rales.  Abd:The abdomen is soft without tenderness, guarding, mass, rebound or organomegaly. Bowel sounds are present  Back: Tenderness lumbar area  EXT: Lower extremities 5/5  strength bilaterally  NEURO: Neurovascularly Intact Distally.   DTRs normal and symmetric throughout L.E  positive straight leg-raise steven    Assessment:    Back Pain    Plan:  Continue with present work restrictions.    SUBJECTIVE: Continued back pain with lumbar radiculopathy. No change in symptoms from last year  OBJECTIVE: restricted range of motion and positive .straight leg-raise     ICD-10-CM    1. Hypertension, unspecified type I10    2. Pure hypercholesterolemia E78.00    3. Lumbar radiculopathy M54.16     PLAN:no change in restriction for duty

## 2018-06-11 ENCOUNTER — TELEPHONE (OUTPATIENT)
Dept: FAMILY MEDICINE | Facility: CLINIC | Age: 63
End: 2018-06-11

## 2018-06-11 NOTE — TELEPHONE ENCOUNTER
Forms received via fax for Attending Physician's Statement of Disability for Genie. Placed in your office to complete.Tenisha Lange MA/CORKY

## 2018-06-12 NOTE — TELEPHONE ENCOUNTER
Dr Carter filled out forms-emailed to [facility] because they are requesting records.Tenisha Lange MA/CORKY

## 2018-06-15 DIAGNOSIS — I10 ESSENTIAL HYPERTENSION WITH GOAL BLOOD PRESSURE LESS THAN 140/90: ICD-10-CM

## 2018-06-15 RX ORDER — LOSARTAN POTASSIUM 100 MG/1
TABLET ORAL
Qty: 90 TABLET | Refills: 3 | Status: SHIPPED | OUTPATIENT
Start: 2018-06-15 | End: 2019-06-20

## 2018-07-17 ENCOUNTER — OFFICE VISIT (OUTPATIENT)
Dept: FAMILY MEDICINE | Facility: CLINIC | Age: 63
End: 2018-07-17
Payer: COMMERCIAL

## 2018-07-17 VITALS
OXYGEN SATURATION: 98 % | HEART RATE: 112 BPM | SYSTOLIC BLOOD PRESSURE: 138 MMHG | RESPIRATION RATE: 18 BRPM | DIASTOLIC BLOOD PRESSURE: 88 MMHG | TEMPERATURE: 98.6 F | BODY MASS INDEX: 31.15 KG/M2 | HEIGHT: 72 IN | WEIGHT: 230 LBS

## 2018-07-17 DIAGNOSIS — J32.0 CHRONIC MAXILLARY SINUSITIS: ICD-10-CM

## 2018-07-17 DIAGNOSIS — M54.12 CERVICAL RADICULOPATHY: Primary | ICD-10-CM

## 2018-07-17 PROCEDURE — 99213 OFFICE O/P EST LOW 20 MIN: CPT | Performed by: FAMILY MEDICINE

## 2018-07-17 RX ORDER — AMOXICILLIN 500 MG/1
1000 CAPSULE ORAL 2 TIMES DAILY
Qty: 120 CAPSULE | Refills: 0 | Status: SHIPPED | OUTPATIENT
Start: 2018-07-17 | End: 2019-01-02

## 2018-07-17 ASSESSMENT — PAIN SCALES - GENERAL: PAINLEVEL: NO PAIN (0)

## 2018-07-17 NOTE — PROGRESS NOTES
"SUBJECTIVE:  Enrico Leos is a 62 year old male who is seen for neck pain     years ago.   Mechanism of injury: working for [employer] and was torquing notes over head and felt pull in neck.   Immediate symptoms: immediate pain, delayed pain.   Symptoms have been sudden since that time.  Pain Quality: Sharp  Modifying Factors:     Pain Improved with:time but comes and goes from time to time    Pain Worsened with: neck motion   Numbness or Tingling: yes      Patients past medical, surgical, social and family histories reviewed.    EXAM:  /88  Pulse 112  Temp 98.6  F (37  C) (Oral)  Resp 18  Ht 6' (1.829 m)  Wt 230 lb (104.3 kg)  SpO2 98%  BMI 31.19 kg/m2  GENERAL APPEARANCE: healthy, alert and no distress   SKIN: no suspicious lesions or rashesextremities\",\"DTR symmetrically normal in lower extremities\"}  PSYCH:  mentation appears normal and affect normal/bright    MUSCULOSKELETAL:  Inspection: normal cervical lordosis  Tender:  spinous processes, left paracervical muscles, left trapezius muscles  Non-tender:  right paracervical muscles, right trapezius muscles  Range of Motion:  Full ROM of cervical spine  Strength: Full strength of all neck muscles      ASSESSMENT:     ICD-10-CM    1. Cervical radiculopathy M54.12 MR Cervical Spine w/o Contrast       "

## 2018-07-17 NOTE — NURSING NOTE
Chief Complaint   Patient presents with     Musculoskeletal Problem     neck pain - headaches on left side - left arm tingling        Initial BP (!) 143/94  Pulse 112  Temp 98.6  F (37  C) (Oral)  Resp 18  Ht 6' (1.829 m)  Wt 230 lb (104.3 kg)  SpO2 98%  BMI 31.19 kg/m2 Estimated body mass index is 31.19 kg/(m^2) as calculated from the following:    Height as of this encounter: 6' (1.829 m).    Weight as of this encounter: 230 lb (104.3 kg).  Medication Reconciliation: complete  Macy Perkins M.A.

## 2018-07-17 NOTE — MR AVS SNAPSHOT
After Visit Summary   7/17/2018    Enrico Leos    MRN: [id number]           Patient Information     Date Of Birth          1955        Visit Information        Provider Department      7/17/2018 9:00 AM Josh Carter MD [facility]        Today's Diagnoses     Cervical radiculopathy    -  1    Chronic maxillary sinusitis           Follow-ups after your visit        Future tests that were ordered for you today     Open Future Orders        Priority Expected Expires Ordered    MR Cervical Spine w/o Contrast Routine  7/17/2019 7/17/2018            Who to contact     If you have questions or need follow up information about today's clinic visit or your schedule please contact [facility] directly at [phone].  Normal or non-critical lab and imaging results will be communicated to you by MyChart, letter or phone within 4 business days after the clinic has received the results. If you do not hear from us within 7 days, please contact the clinic through Face-Mehart or phone. If you have a critical or abnormal lab result, we will notify you by phone as soon as possible.  Submit refill requests through Quartzy or call your pharmacy and they will forward the refill request to us. Please allow 3 business days for your refill to be completed.          Additional Information About Your Visit        MyChart Information     Quartzy gives you secure access to your electronic health record. If you see a primary care provider, you can also send messages to your care team and make appointments. If you have questions, please call your primary care clinic.  If you do not have a primary care provider, please call [phone] and they will assist you.        Care EveryWhere ID     This is your Care EveryWhere ID. This could be used by other organizations to access your [place] medical records  [id number]        Your Vitals Were     Pulse Temperature Respirations Height Pulse  Oximetry BMI (Body Mass Index)    112 98.6  F (37  C) (Oral) 18 6' (1.829 m) 98% 31.19 kg/m2       Blood Pressure from Last 3 Encounters:   07/17/18 138/88   05/24/18 125/85   12/11/17 125/80    Weight from Last 3 Encounters:   07/17/18 230 lb (104.3 kg)   05/24/18 228 lb (103.4 kg)   12/11/17 226 lb 9.6 oz (102.8 kg)                 Today's Medication Changes          These changes are accurate as of 7/17/18  9:21 AM.  If you have any questions, ask your nurse or doctor.               Start taking these medicines.        Dose/Directions    amoxicillin 500 MG capsule   Commonly known as:  AMOXIL   Used for:  Chronic maxillary sinusitis   Started by:  Josh Carter MD        Dose:  1000 mg   Take 2 capsules (1,000 mg) by mouth 2 times daily   Quantity:  120 capsule   Refills:  0            Where to get your medicines      These medications were sent to [id number] [place] - [address]  [address] [place] [address]     Phone:  [phone]     amoxicillin 500 MG capsule                Primary Care Provider Office Phone # Fax #    Josh Carter [phone] [address] [address] [facility] [address]        Equal Access to Services     [facility] AH: Hadii aad ku hadasho Soomaali, waaxda luqadaha, qaybta kaalmada adeegyada, waxay idiin hayeduardon jimmy major . So [facility] [phone].    ATENCIÓN: Si habla español, tiene a hamlin disposición servicios gratuitos de asistencia lingüística. Llame al [phone].    We comply with applicable federal civil rights laws and [place] laws. We do not discriminate on the basis of race, color, national origin, age, disability, sex, sexual orientation, or gender identity.            Thank you!     Thank you for choosing [facility]  for your care. Our goal is always to provide you with excellent care. Hearing back from our patients is one way we can continue to improve our services. Please take a few minutes  to complete the written survey that you may receive in the mail after your visit with us. Thank you!             Your Updated Medication List - Protect others around you: Learn how to safely use, store and throw away your medicines at www.disposemymeds.org.          This list is accurate as of 7/17/18  9:21 AM.  Always use your most recent med list.                   Brand Name Dispense Instructions for use Diagnosis    amoxicillin 500 MG capsule    AMOXIL    120 capsule    Take 2 capsules (1,000 mg) by mouth 2 times daily    Chronic maxillary sinusitis       aspirin 81 MG tablet      Take 1 tablet by mouth daily.        atorvastatin 40 MG tablet    LIPITOR    90 tablet    TAKE 1 TABLET BY MOUTH EVERY DAY    Mixed hyperlipidemia       hydrochlorothiazide 12.5 MG Tabs tablet     30 tablet    Take 1 tablet (12.5 mg) by mouth daily    Secondary hypertension       losartan 100 MG tablet    COZAAR    90 tablet    TAKE 1 TABLET BY MOUTH DAILY    Essential hypertension with goal blood pressure less than 140/90       omeprazole 40 MG capsule    priLOSEC    90 capsule    TAKE ONE CAPSULE BY MOUTH ONE TIME DAILY    Gastroesophageal reflux disease with esophagitis       sertraline 50 MG tablet    ZOLOFT    90 tablet    TAKE 1 TABLET (50 MG) BY MOUTH DAILY    Lumbar radiculopathy

## 2018-07-19 ENCOUNTER — RADIANT APPOINTMENT (OUTPATIENT)
Dept: MRI IMAGING | Facility: CLINIC | Age: 63
End: 2018-07-19
Attending: FAMILY MEDICINE
Payer: COMMERCIAL

## 2018-07-19 ENCOUNTER — TELEPHONE (OUTPATIENT)
Dept: FAMILY MEDICINE | Facility: CLINIC | Age: 63
End: 2018-07-19

## 2018-07-19 DIAGNOSIS — M54.12 CERVICAL RADICULOPATHY: Primary | ICD-10-CM

## 2018-07-19 DIAGNOSIS — M54.12 CERVICAL RADICULOPATHY: ICD-10-CM

## 2018-07-19 PROCEDURE — 72141 MRI NECK SPINE W/O DYE: CPT | Mod: TC

## 2018-07-19 NOTE — TELEPHONE ENCOUNTER
Form received via fax from [facility]. Medical Source Statement. Placed in your office to complete.Tenisha Lange MA/CORKY

## 2018-07-20 ENCOUNTER — TELEPHONE (OUTPATIENT)
Dept: PALLIATIVE MEDICINE | Facility: CLINIC | Age: 63
End: 2018-07-20

## 2018-07-20 NOTE — TELEPHONE ENCOUNTER
Pre-screening Questions for Radiology Injections:    Injection to be done at which interventional clinic site? [facility] [place]    Instruct patient to arrive as directed prior to the scheduled appointment time:    [place] minutes before      [place]: 1 hour before     Procedure ordered by Dr. Carter    Procedure ordered? Cervical Epidural Steroid Injection    What insurance would patient like us to bill for this procedure? [employer]      Worker's comp or MVA (motor vehicle accident) -Any injection DO NOT SCHEDULE and route to Gloria Downing.      [email or web address] - For SI joint injections, DO NOT SCHEDULE and route Swati Roblero. [employer] NO PA REQUIRED EFFECTIVE 2017      [employer]- MBB's must be scheduled at LEAST two weeks apart      [employer] - Any injection besides hip/shoulder/knee joint DO NOT SCHEDULE and route to Swati Roblero. She will obtain PA and call pt back to schedule procedure or notify pt of denial.       [employer]-Route to Swati for review    Any chance of pregnancy? NO   If YES, do NOT schedule and route to RN pool    Is an  needed? No     Patient has a drive home? (mandatory) YES: INFORMED    Is patient taking any blood thinners (plavix, coumadin, jantoven, warfarin, heparin, pradaxa or dabigatran )? No   If hold needed, do NOT schedule, route to RN pool     Is patient taking any aspirin products? No     If more than 325mg/day do NOT schedule; route to RN pool     For CERVICAL procedures, hold all aspirin products for 6 days.      Does the patient have a bleeding or clotting disorder? No     If YES, okay to schedule AND route to RN nurse pool    **For any patients with platelet count <100, must be forwarded to provider**    Is patient diabetic?  No  If YES, have them bring their glucometer.    Does patient have an active infection or treated for one within the past week? No     Is patient currently taking any antibiotics?  Yes -  Amoxicillin -- end date 8/7     For patients on chronic, preventative, or prophylactic antibiotics, procedures may be scheduled.     For patients on antibiotics for active or recent infection:    Nahum Jean Burton, [place]-antibiotic course must have been completed for 4 days    Is patient currently taking any steroid medications? (i.e. Prednisone, Medrol)  No     For patients on steroid medications:    Nahum Jean Burton, Snitzer-steroid course must have been completed for 4 days    Reviewed with patient:  If you are started on any steroids or antibiotics between now and your appointment, you must contact us because it may affect our ability to perform your procedure.  Yes    Is patient actively being treated for cancer or immunocompromised? No  If YES, do NOT schedule and route to RN pool     Are you able to get on and off an exam table with minimal or no assistance? Yes  If NO, do NOT schedule and route to RN pool    Are you able to roll over and lay on your stomach with minimal or no assistance? Yes  If NO, do NOT schedule and route to RN pool     Any allergies to contrast dye, iodine, shellfish, or numbing and steroid medications? No  If YES, route to RN pool AND add allergy information to appointment notes    Allergies: No known drug allergies      Has the patient had a flu shot or any other vaccinations within 7 days before or after the procedure.  No     Does patient have an MRI/CT?  YES: MRI  (SI joint, hip injections, lumbar sympathetic blocks, and stellate ganglion blocks do not require an MRI)    Was the MRI done w/in the last 3 years?  Yes    Was MRI done at [place]? Yes      If not, where was it done? N/A       If MRI was not done at [place], [facility] or [facility] do NOT schedule and route to nursing.  If pt has an imaging disc, the injection may be scheduled but pt has to bring disc to appt. If they show up w/out disc the injection cannot be done    Reminders (please  tell patient if applicable):       Instructed pt to arrive 30 minutes early for IV start if this is for a cervical procedure, ALL sympathetic (stellate ganglion, hypogastric, or lumbar sympathetic block) and all sedation procedures (RFA, spinal cord stimulation trials).  YES: INFORMED   -IVs are not routinely placed for Dr. Vail cervical cases   -Dr. Barth: IVs for cervical ESIs and cervical TBDs (not CMBBs/facet inj)      If NPO for sedation, informed patient that it is okay to take medications with sips of water (except if they are to hold blood thinners).  Not Applicable   *DO take blood pressure medication if it is prescribed*      If this is for a cervical VINITA, informed patient that aspirin needs to be held for 6 days.   YES: INFORMED      For all patients not having spinal cord stimulator (SCS) trials or radiofrequency ablations (RFAs), informed patient:    IV sedation is not provided for this procedure.  If you feel that an oral anti-anxiety medication is needed, you can discuss this further with your referring provider or primary care provider.  The Pain Clinic provider will discuss specifics of what the procedure includes at your appointment.  Most procedures last 10-20 minutes.  We use numbing medications to help with any discomfort during the procedure.  YES: INFORMED      Do not schedule procedures requiring IV placement in the first appointment of the day or first appointment after lunch. Do NOT schedule at 0745, 0815 or 1245. REVIEWED      For patients 85 or older we recommend having an adult stay w/ them for the remainder of the day.   N/A    Does the patient have any questions?  NO  Sheila Go  [facility]

## 2018-07-23 DIAGNOSIS — J32.0 CHRONIC MAXILLARY SINUSITIS: Primary | ICD-10-CM

## 2018-07-25 ENCOUNTER — RADIANT APPOINTMENT (OUTPATIENT)
Dept: CT IMAGING | Facility: CLINIC | Age: 63
End: 2018-07-25
Attending: FAMILY MEDICINE
Payer: COMMERCIAL

## 2018-07-25 DIAGNOSIS — J32.4 CHRONIC PANSINUSITIS: Primary | ICD-10-CM

## 2018-07-25 DIAGNOSIS — J32.0 CHRONIC MAXILLARY SINUSITIS: ICD-10-CM

## 2018-07-25 PROCEDURE — 70486 CT MAXILLOFACIAL W/O DYE: CPT | Mod: TC

## 2018-08-03 NOTE — TELEPHONE ENCOUNTER
I refaxed the forms to Elsy/Audrey @ [phone].  It was faxed to an incorrect fax number the first time on 7/19/18.  Montserrat Kirk,

## 2018-08-08 ENCOUNTER — TRANSFERRED RECORDS (OUTPATIENT)
Dept: HEALTH INFORMATION MANAGEMENT | Facility: CLINIC | Age: 63
End: 2018-08-08

## 2018-08-10 ENCOUNTER — OFFICE VISIT (OUTPATIENT)
Dept: OTOLARYNGOLOGY | Facility: CLINIC | Age: 63
End: 2018-08-10
Payer: COMMERCIAL

## 2018-08-10 VITALS
WEIGHT: 227 LBS | RESPIRATION RATE: 16 BRPM | SYSTOLIC BLOOD PRESSURE: 126 MMHG | DIASTOLIC BLOOD PRESSURE: 90 MMHG | TEMPERATURE: 98.4 F | HEIGHT: 72 IN | BODY MASS INDEX: 30.75 KG/M2

## 2018-08-10 DIAGNOSIS — J32.4 CHRONIC PANSINUSITIS: Primary | ICD-10-CM

## 2018-08-10 PROCEDURE — 99243 OFF/OP CNSLTJ NEW/EST LOW 30: CPT | Performed by: OTOLARYNGOLOGY

## 2018-08-10 RX ORDER — SULFAMETHOXAZOLE/TRIMETHOPRIM 800-160 MG
1 TABLET ORAL 2 TIMES DAILY
Qty: 28 TABLET | Refills: 0 | Status: SHIPPED | OUTPATIENT
Start: 2018-08-10 | End: 2018-08-24

## 2018-08-10 RX ORDER — PREDNISONE 20 MG/1
TABLET ORAL
Qty: 20 TABLET | Refills: 0 | Status: SHIPPED | OUTPATIENT
Start: 2018-08-10 | End: 2018-10-03

## 2018-08-10 RX ORDER — MONTELUKAST SODIUM 10 MG/1
10 TABLET ORAL AT BEDTIME
Qty: 30 TABLET | Refills: 1 | Status: SHIPPED | OUTPATIENT
Start: 2018-08-10 | End: 2019-05-13

## 2018-08-10 NOTE — LETTER
8/10/2018         RE: Enrico Leos  [address] [facility] [place] [address]        Dear Colleague,    Thank you for referring your patient, Enrico Leos, to the [facility]. Please see a copy of my visit note below.    I am seeing this patient in consultation for sinusitis at the request of the provider Dr. Carter.    Chief Complaint - sinusitis    History of Present Illness - Enrico Leos is a 62 year old male who presents for evaluation of possible chronic sinusitis. The patient describes symptoms of purulent drainage, nasal congestion, wheezing, plugged head, some dizziness for the past couple years.  Presently, the patient is symptomatic. Treatments have included antibiotics (3-4 times), zyrtec, nasal saline irrigations, flonase. The treatments seem to not help, or help minimally. No prior history of sinus surgery. CT sinus 7/25/2018 showed chronic sinusitis.     Past Medical History -   Patient Active Problem List   Diagnosis     Mixed hyperlipidemia     Lumbar radiculopathy     HTN (hypertension)     MEDIAL MENISCUS TEAR - left     OA (Osteoarthritis) of Knee - left     HYPERLIPIDEMIA LDL GOAL <130     Allergic rhinitis     Hyperlipidemia     Hypertension     Degenerative joint disease     Hypertension goal BP (blood pressure) < 140/90     Chronic fatigue     Joint ache     GERD (gastroesophageal reflux disease)     Chronic constipation     Advanced directives, counseling/discussion     Cervical radiculitis     Cervical radiculopathy     Post-traumatic osteoarthritis of left knee       Current Medications -   Current Outpatient Prescriptions:      amoxicillin (AMOXIL) 500 MG capsule, Take 2 capsules (1,000 mg) by mouth 2 times daily, Disp: 120 capsule, Rfl: 0     aspirin 81 MG tablet, Take 1 tablet by mouth daily., Disp: , Rfl:      atorvastatin (LIPITOR) 40 MG tablet, TAKE 1 TABLET BY MOUTH EVERY DAY, Disp: 90 tablet, Rfl: 1     losartan (COZAAR) 100 MG tablet, TAKE 1 TABLET BY  MOUTH DAILY, Disp: 90 tablet, Rfl: 3     omeprazole (PRILOSEC) 40 MG capsule, TAKE ONE CAPSULE BY MOUTH ONE TIME DAILY, Disp: 90 capsule, Rfl: 2     sertraline (ZOLOFT) 50 MG tablet, TAKE 1 TABLET (50 MG) BY MOUTH DAILY, Disp: 90 tablet, Rfl: 1     hydrochlorothiazide 12.5 MG TABS tablet, Take 1 tablet (12.5 mg) by mouth daily (Patient not taking: Reported on 7/17/2018), Disp: 30 tablet, Rfl: 1    Allergies -   Allergies   Allergen Reactions     No Known Drug Allergies        Social History -   Social History     Social History     Marital status:      Spouse name: Sheridan     Number of children: 3     Years of education: N/A     Occupational History     aircraft [employer]     Social History Main Topics     Smoking status: Never Smoker     Smokeless tobacco: Never Used     Alcohol use Yes      Comment: occasional     Drug use: No     Sexual activity: Yes     Partners: Female     Other Topics Concern     None     Social History Narrative       Family History -   Family History   Problem Relation Age of Onset     Cancer Mother      tumor on leg     Lipids Mother      Cancer Maternal Grandmother      lung     Cerebrovascular Disease Paternal Grandmother        Review of Systems - As per HPI and PMHx, otherwise 10+ comprehensive system review is negative.    Physical Exam  /90 (Cuff Size: Adult Large)  Temp 98.4  F (36.9  C) (Tympanic)  Resp 16  Ht 1.829 m (6')  Wt 103 kg (227 lb)  BMI 30.79 kg/m2  General - The patient is nontoxic, in no distress. Alert and oriented to person and place, answers questions and cooperates with examination appropriately.   Neurologic - CN II-XII are intact. No focal neurologic deficits.   Voice and Breathing - The patient was breathing comfortably without the use of accessory muscles. There was no wheezing, stridor, or stertor.  The patients voice was clear and strong.  Eyes - Extraocular movements intact.  Sclera were not icteric or injected,  conjunctiva were pink and moist.  Mouth - Examination of the oral cavity showed pink, healthy oral mucosa. No lesions or ulcerations noted.  The tongue was mobile and midline.  Throat - The walls of the oropharynx were smooth, symmetric, and had no lesions or ulcerations.  No postnasal drainage.  The uvula was midline on elevation.  Ears - Bilateral pinna and EACs with normal appearing overlying skin. Tympanic membrane intact bilaterally. Bony landmarks of the ossicular chain are normal. No retraction, perforation, or masses.  No fluid or purulence was seen in the external canal or the middle ear.   Nose - External contour is symmetric, no gross deflection or scars.  Nasal mucosa is pink and moist with no abnormal mucus.  The septum was midline and non-obstructive, turbinates hypertrophic.  No polyps, masses, or purulence noted on examination anteriorly.  Neck - Palpation of the occipital, submental, submandibular, internal jugular chain, and supraclavicular nodes did not demonstrate any abnormal lymph nodes or masses. No parotid masses. Palpation of the thyroid was soft and smooth, with no nodules or goiter appreciated.  The trachea was mobile and midline.  Cardiovascular - carotid pulses are 2+ bilaterally, regular rhythm      A/P - Enrico ROBINA Leos is a 62 year old male with chronic pansinusitis. I recommend treatment with bactrim and prednisone, singulair. Continue nasal saline irrigations and flonase. If this fails, would pursue FESS. Return in 4-8 weeks.       Skyler Pardo MD  Otolaryngology  [facility]      Again, thank you for allowing me to participate in the care of your patient.        Sincerely,        Skyler Pardo MD

## 2018-08-10 NOTE — PATIENT INSTRUCTIONS
General Scheduling Information  To schedule your CT/MRI scan, please contact [facility] at [phone]   [address] [place] [address]    To schedule your Surgery, please contact our Specialty Schedulers at [phone]    ENT Clinic Locations Clinic Hours Telephone Number     [facility]  [address] [place] [address]   Tuesday:       8:00am -- 4:00pm    Wednesday:  8:00am - 4:00pm   To schedule an appointment with   Dr. Pardo,   please contact our   Specialty Scheduling Department at:     [phone]       [facility]  [address] [address]   Friday:          8:00am - 4:00pm         Urgent Care Locations Clinic Hours Telephone Numbers     [facility]  [address] [place] [address]     Monday-Friday:     11:00pm - 9:00pm    Saturday-Sunday:  9:00am - 5:00pm   [phone]     [facility]  [address] [place] [address]     Monday-Friday:      5:00pm - 9:00pm     Saturday-Sunday:  9:00am - 5:00pm   [phone]

## 2018-08-10 NOTE — MR AVS SNAPSHOT
After Visit Summary   8/10/2018    Enrico Leos    MRN: [id number]           Patient Information     Date Of Birth          1955        Visit Information        Provider Department      8/10/2018 8:45 AM Skyler Pardo MD [facility]        Today's Diagnoses     Chronic pansinusitis    -  1      Care Instructions    General Scheduling Information  To schedule your CT/MRI scan, please contact [place] [facility] at [phone] [address] [facility]  [place] [address]    To schedule your Surgery, please contact our Specialty Schedulers at [phone]    ENT Clinic Locations Clinic Hours Telephone Number     [facility]  [address] [place] [address]   Tuesday:       8:00am -- 4:00pm    Wednesday:  8:00am - 4:00pm   To schedule an appointment with   Dr. Pardo,   please contact our   Specialty Scheduling Department at:     [phone]       [facility]  [address] [facility] [place] [address]   Friday:          8:00am - 4:00pm         Urgent Care Locations Clinic Hours Telephone Numbers     [facility] [place]  [address] [place] [address]     Monday-Friday:     11:00pm - 9:00pm    Saturday-Sunday:  9:00am - 5:00pm   [phone]     [facility]  [address]   [place] [address]     Monday-Friday:      5:00pm - 9:00pm     Saturday-Sunday:  9:00am - 5:00pm   [phone]                   Follow-ups after your visit        Your next 10 appointments already scheduled     Aug 21, 2018  8:45 AM CDT   Radiology Injections with Yomi Barth MD   [facility] [place] ([facility])    [address] [facility]  [place] [address] [phone]              Who to contact     If you have questions or need follow up information about today's clinic visit or your schedule please contact [facility] directly at [phone].  Normal or non-critical lab and imaging results will be  communicated to you by [employer], letter or phone within 4 business days after the clinic has received the results. If you do not hear from us within 7 days, please contact the clinic through [employer] or phone. If you have a critical or abnormal lab result, we will notify you by phone as soon as possible.  Submit refill requests through [employer] or call your pharmacy and they will forward the refill request to us. Please allow 3 business days for your refill to be completed.          Additional Information About Your Visit        Ref[employer] Information     [employer] gives you secure access to your electronic health record. If you see a primary care provider, you can also send messages to your care team and make appointments. If you have questions, please call your primary care clinic.  If you do not have a primary care provider, please call [phone] and they will assist you.        Care EveryWhere ID     This is your Care EveryWhere ID. This could be used by other organizations to access your [place] medical records  [id number]        Your Vitals Were     Temperature Respirations Height BMI (Body Mass Index)          98.4  F (36.9  C) (Tympanic) 16 1.829 m (6') 30.79 kg/m2         Blood Pressure from Last 3 Encounters:   08/10/18 126/90   07/17/18 138/88   05/24/18 125/85    Weight from Last 3 Encounters:   08/10/18 103 kg (227 lb)   07/17/18 104.3 kg (230 lb)   05/24/18 103.4 kg (228 lb)              Today, you had the following     No orders found for display         Today's Medication Changes          These changes are accurate as of 8/10/18  3:42 PM.  If you have any questions, ask your nurse or doctor.               Start taking these medicines.        Dose/Directions    montelukast 10 MG tablet   Commonly known as:  SINGULAIR   Used for:  Chronic pansinusitis   Started by:  Skyler Pardo MD        Dose:  10 mg   Take 1 tablet (10 mg) by mouth At Bedtime   Quantity:  30 tablet   Refills:  1       predniSONE  20 MG tablet   Commonly known as:  DELTASONE   Used for:  Chronic pansinusitis   Started by:  Skyler Pardo MD        Take 60 mg daily for 3 days, 40 mg daily for 3 days, 20 mg daily for 3 days, 10 mg daily for 3 days   Quantity:  20 tablet   Refills:  0       sulfamethoxazole-trimethoprim 800-160 MG per tablet   Commonly known as:  BACTRIM DS/SEPTRA DS   Used for:  Chronic pansinusitis   Started by:  Skyler Pardo MD        Dose:  1 tablet   Take 1 tablet by mouth 2 times daily for 14 days   Quantity:  28 tablet   Refills:  0            Where to get your medicines      These medications were sent to [facility] [address] [facility] - [place], [place] - [address]  [address], [place] [address]     Phone:  [phone]     montelukast 10 MG tablet    predniSONE 20 MG tablet    sulfamethoxazole-trimethoprim 800-160 MG per tablet                Primary Care Provider Office Phone # Fax #    Josh Jacques Carter [phone] [address] [facility] [address]        Equal Access to Services     [facility]: Hadii aad ku hadasho Soomaali, waaxda luqadaha, qaybta kaalmada adeegyada, waxay idiin hayeduardon jimmy major . So [facility] [place] [facility] [phone].    ATENCIÓN: Si habla español, tiene a hamlin disposición servicios gratuitos de asistencia lingüística. [facility] [phone].    We comply with applicable federal civil rights laws and [place] laws. We do not discriminate on the basis of race, color, national origin, age, disability, sex, sexual orientation, or gender identity.            Thank you!     Thank you for choosing [facility]  for your care. Our goal is always to provide you with excellent care. Hearing back from our patients is one way we can continue to improve our services. Please take a few minutes to complete the written survey that you may receive in the mail after your visit with us. Thank you!             Your Updated Medication List - Protect others around you:  Learn how to safely use, store and throw away your medicines at www.disposemymeds.org.          This list is accurate as of 8/10/18  3:42 PM.  Always use your most recent med list.                   Brand Name Dispense Instructions for use Diagnosis    amoxicillin 500 MG capsule    AMOXIL    120 capsule    Take 2 capsules (1,000 mg) by mouth 2 times daily    Chronic maxillary sinusitis       aspirin 81 MG tablet      Take 1 tablet by mouth daily.        atorvastatin 40 MG tablet    LIPITOR    90 tablet    TAKE 1 TABLET BY MOUTH EVERY DAY    Mixed hyperlipidemia       hydrochlorothiazide 12.5 MG Tabs tablet     30 tablet    Take 1 tablet (12.5 mg) by mouth daily    Secondary hypertension       losartan 100 MG tablet    COZAAR    90 tablet    TAKE 1 TABLET BY MOUTH DAILY    Essential hypertension with goal blood pressure less than 140/90       montelukast 10 MG tablet    SINGULAIR    30 tablet    Take 1 tablet (10 mg) by mouth At Bedtime    Chronic pansinusitis       omeprazole 40 MG capsule    priLOSEC    90 capsule    TAKE ONE CAPSULE BY MOUTH ONE TIME DAILY    Gastroesophageal reflux disease with esophagitis       predniSONE 20 MG tablet    DELTASONE    20 tablet    Take 60 mg daily for 3 days, 40 mg daily for 3 days, 20 mg daily for 3 days, 10 mg daily for 3 days    Chronic pansinusitis       sertraline 50 MG tablet    ZOLOFT    90 tablet    TAKE 1 TABLET (50 MG) BY MOUTH DAILY    Lumbar radiculopathy       sulfamethoxazole-trimethoprim 800-160 MG per tablet    BACTRIM DS/SEPTRA DS    28 tablet    Take 1 tablet by mouth 2 times daily for 14 days    Chronic pansinusitis

## 2018-08-10 NOTE — PROGRESS NOTES
I am seeing this patient in consultation for sinusitis at the request of the provider Dr. Carter.    Chief Complaint - sinusitis    History of Present Illness - Enrico Leos is a 62 year old male who presents for evaluation of possible chronic sinusitis. The patient describes symptoms of purulent drainage, nasal congestion, wheezing, plugged head, some dizziness for the past couple years.  Presently, the patient is symptomatic. Treatments have included antibiotics (3-4 times), zyrtec, nasal saline irrigations, flonase. The treatments seem to not help, or help minimally. No prior history of sinus surgery. CT sinus 7/25/2018 showed chronic sinusitis.     Past Medical History -   Patient Active Problem List   Diagnosis     Mixed hyperlipidemia     Lumbar radiculopathy     HTN (hypertension)     MEDIAL MENISCUS TEAR - left     OA (Osteoarthritis) of Knee - left     HYPERLIPIDEMIA LDL GOAL <130     Allergic rhinitis     Hyperlipidemia     Hypertension     Degenerative joint disease     Hypertension goal BP (blood pressure) < 140/90     Chronic fatigue     Joint ache     GERD (gastroesophageal reflux disease)     Chronic constipation     Advanced directives, counseling/discussion     Cervical radiculitis     Cervical radiculopathy     Post-traumatic osteoarthritis of left knee       Current Medications -   Current Outpatient Prescriptions:      amoxicillin (AMOXIL) 500 MG capsule, Take 2 capsules (1,000 mg) by mouth 2 times daily, Disp: 120 capsule, Rfl: 0     aspirin 81 MG tablet, Take 1 tablet by mouth daily., Disp: , Rfl:      atorvastatin (LIPITOR) 40 MG tablet, TAKE 1 TABLET BY MOUTH EVERY DAY, Disp: 90 tablet, Rfl: 1     losartan (COZAAR) 100 MG tablet, TAKE 1 TABLET BY MOUTH DAILY, Disp: 90 tablet, Rfl: 3     omeprazole (PRILOSEC) 40 MG capsule, TAKE ONE CAPSULE BY MOUTH ONE TIME DAILY, Disp: 90 capsule, Rfl: 2     sertraline (ZOLOFT) 50 MG tablet, TAKE 1 TABLET (50 MG) BY MOUTH DAILY, Disp: 90 tablet, Rfl:  1     hydrochlorothiazide 12.5 MG TABS tablet, Take 1 tablet (12.5 mg) by mouth daily (Patient not taking: Reported on 7/17/2018), Disp: 30 tablet, Rfl: 1    Allergies -   Allergies   Allergen Reactions     No Known Drug Allergies        Social History -   Social History     Social History     Marital status:      Spouse name: Sheridan     Number of children: 3     Years of education: N/A     Occupational History     [employer]     Social History Main Topics     Smoking status: Never Smoker     Smokeless tobacco: Never Used     Alcohol use Yes      Comment: occasional     Drug use: No     Sexual activity: Yes     Partners: Female     Other Topics Concern     None     Social History Narrative       Family History -   Family History   Problem Relation Age of Onset     Cancer Mother      tumor on leg     Lipids Mother      Cancer Maternal Grandmother      lung     Cerebrovascular Disease Paternal Grandmother        Review of Systems - As per HPI and PMHx, otherwise 10+ comprehensive system review is negative.    Physical Exam  /90 (Cuff Size: Adult Large)  Temp 98.4  F (36.9  C) (Tympanic)  Resp 16  Ht 1.829 m (6')  Wt 103 kg (227 lb)  BMI 30.79 kg/m2  General - The patient is nontoxic, in no distress. Alert and oriented to person and place, answers questions and cooperates with examination appropriately.   Neurologic - CN II-XII are intact. No focal neurologic deficits.   Voice and Breathing - The patient was breathing comfortably without the use of accessory muscles. There was no wheezing, stridor, or stertor.  The patients voice was clear and strong.  Eyes - Extraocular movements intact.  Sclera were not icteric or injected, conjunctiva were pink and moist.  Mouth - Examination of the oral cavity showed pink, healthy oral mucosa. No lesions or ulcerations noted.  The tongue was mobile and midline.  Throat - The walls of the oropharynx were smooth, symmetric, and had no  lesions or ulcerations.  No postnasal drainage.  The uvula was midline on elevation.  Ears - Bilateral pinna and EACs with normal appearing overlying skin. Tympanic membrane intact bilaterally. Bony landmarks of the ossicular chain are normal. No retraction, perforation, or masses.  No fluid or purulence was seen in the external canal or the middle ear.   Nose - External contour is symmetric, no gross deflection or scars.  Nasal mucosa is pink and moist with no abnormal mucus.  The septum was midline and non-obstructive, turbinates hypertrophic.  No polyps, masses, or purulence noted on examination anteriorly.  Neck - Palpation of the occipital, submental, submandibular, internal jugular chain, and supraclavicular nodes did not demonstrate any abnormal lymph nodes or masses. No parotid masses. Palpation of the thyroid was soft and smooth, with no nodules or goiter appreciated.  The trachea was mobile and midline.  Cardiovascular - carotid pulses are 2+ bilaterally, regular rhythm      A/P - Enrico Leos is a 62 year old male with chronic pansinusitis. I recommend treatment with bactrim and prednisone, singulair. Continue nasal saline irrigations and flonase. If this fails, would pursue FESS. Return in 4-8 weeks.       Skyler Pardo MD  Otolaryngology  [facility]

## 2018-10-03 ENCOUNTER — TELEPHONE (OUTPATIENT)
Dept: FAMILY MEDICINE | Facility: CLINIC | Age: 63
End: 2018-10-03

## 2018-10-03 DIAGNOSIS — J32.4 CHRONIC PANSINUSITIS: ICD-10-CM

## 2018-10-03 RX ORDER — PREDNISONE 20 MG/1
TABLET ORAL
Qty: 20 TABLET | Refills: 0 | Status: SHIPPED | OUTPATIENT
Start: 2018-10-03 | End: 2019-01-02

## 2018-10-25 DIAGNOSIS — E78.2 MIXED HYPERLIPIDEMIA: ICD-10-CM

## 2018-10-25 RX ORDER — ATORVASTATIN CALCIUM 40 MG/1
TABLET, FILM COATED ORAL
Qty: 90 TABLET | Refills: 1 | Status: SHIPPED | OUTPATIENT
Start: 2018-10-25 | End: 2019-04-19

## 2018-11-05 DIAGNOSIS — Z53.9 ERRONEOUS ENCOUNTER--DISREGARD: Primary | ICD-10-CM

## 2018-11-18 DIAGNOSIS — M54.16 LUMBAR RADICULOPATHY: ICD-10-CM

## 2018-12-17 DIAGNOSIS — I10 ESSENTIAL HYPERTENSION WITH GOAL BLOOD PRESSURE LESS THAN 140/90: ICD-10-CM

## 2018-12-19 RX ORDER — LOSARTAN POTASSIUM 100 MG/1
100 TABLET ORAL DAILY
Qty: 90 TABLET | Refills: 3 | OUTPATIENT
Start: 2018-12-19

## 2019-01-02 ENCOUNTER — OFFICE VISIT (OUTPATIENT)
Dept: FAMILY MEDICINE | Facility: CLINIC | Age: 64
End: 2019-01-02

## 2019-01-02 ENCOUNTER — NURSE TRIAGE (OUTPATIENT)
Dept: NURSING | Facility: CLINIC | Age: 64
End: 2019-01-02

## 2019-01-02 VITALS
OXYGEN SATURATION: 96 % | DIASTOLIC BLOOD PRESSURE: 97 MMHG | HEIGHT: 73 IN | SYSTOLIC BLOOD PRESSURE: 145 MMHG | HEART RATE: 104 BPM | WEIGHT: 230 LBS | TEMPERATURE: 98 F | BODY MASS INDEX: 30.48 KG/M2

## 2019-01-02 DIAGNOSIS — I10 ESSENTIAL HYPERTENSION: ICD-10-CM

## 2019-01-02 DIAGNOSIS — J01.41 ACUTE RECURRENT PANSINUSITIS: Primary | ICD-10-CM

## 2019-01-02 PROCEDURE — 99214 OFFICE O/P EST MOD 30 MIN: CPT | Performed by: INTERNAL MEDICINE

## 2019-01-02 RX ORDER — MONTELUKAST SODIUM 10 MG/1
10 TABLET ORAL AT BEDTIME
Qty: 90 TABLET | Refills: 0 | Status: SHIPPED | OUTPATIENT
Start: 2019-01-02 | End: 2019-04-01

## 2019-01-02 ASSESSMENT — MIFFLIN-ST. JEOR: SCORE: 1902.15

## 2019-01-02 NOTE — TELEPHONE ENCOUNTER
"\"I have congestion and it is getting worse.\" Patient reporting symptoms started on 12/30/18. Reporting  Temp 99.3 Tympanic during triage. Patient reporting increased fatigue, congestion, sinus pressure, ear pain. Productive cough with yellow sputum.   Per guidelines advised to be seen with in 24 hours. Caller verbalized understanding. Denies further questions.    Sheila Mcdowell RN  [facility]      Reason for Disposition    Earache    Additional Information    Negative: Severe difficulty breathing (e.g., struggling for each breath, speaks in single words)    Negative: Bluish lips, tongue, or face now    Negative: Shock suspected (e.g., cold/pale/clammy skin, too weak to stand, low BP, rapid pulse)    Negative: Sounds like a life-threatening emergency to the triager    Negative: Severe sore throat    Negative: [1] Doesn't match the criteria for Influenza AND [2] sounds like a cold    Negative: Influenza vaccine reaction is suspected    Negative: Chest pain  (Exception: MILD central chest pain, present only when coughing)    Negative: [1] Headache AND [2] stiff neck (can't touch chin to chest)    Negative: Fever > 104 F (40 C)    Negative: [1] Difficulty breathing AND [2] not severe AND [3] not from stuffy nose (e.g., not relieved by cleaning out the nose)    Negative: Patient sounds very sick or weak to the triager    Negative: [1] Fever > 101 F (38.3 C) AND [2] age > 60    Negative: [1] Fever > 101 F (38.3 C) AND [2] bedridden (e.g., nursing home patient, CVA, chronic illness, recovering from surgery)    Negative: [1] Fever > 100.5 F (38.1 C) AND [2] diabetes mellitus or weak immune system (e.g., HIV positive, cancer chemo, splenectomy, organ transplant, chronic steroids)    Negative: Patient is HIGH RISK (e.g., age > 64 years, pregnant, HIV+, or chronic medical condition)    Negative: Fever present > 3 days (72 hours)    Negative: [1] Fever returns after gone for over 24 hours AND [2] symptoms worse or " not improved    Negative: [1] Using nasal washes and pain medicine > 24 hours AND [2] sinus pain (around cheekbone or eye) persists    Protocols used: INFLUENZA - SEASONAL-ADULT-AH

## 2019-01-02 NOTE — PATIENT INSTRUCTIONS
Patient to follow up with Primary Care provider regarding elevated blood pressure.  Recheck BP in 1-2 weeks at pharmacy or with pcp.

## 2019-01-02 NOTE — PROGRESS NOTES
SUBJECTIVE:  Enrico Leos is an 63 year old male who presents for congestion.  He has had congestion for some time.  Went to see ENT after his pcp referred him and was put on Singulair and bactrim and got better.  Has been good for a few months, but then recently started to get congestion again.  Feels achy. No fevers.  Some cough.  Nasal congestion.  Has sinus pressure and pain and teeth hurt some.   No n/v/d.  Recently flew and had ear pain.  otc meds taken but hasn't helped.  Is on meds for htn and usually is stable.  No skin rashes.  No recent intl travel.     PMH:   has a past medical history of Allergic rhinitis, cause unspecified, Chronic fatigue (9/9/2011), Degenerative joint disease, Hypertension, Lumbago, Other and unspecified hyperlipidemia, and Pure hypercholesterolemia.  Patient Active Problem List   Diagnosis     Mixed hyperlipidemia     Lumbar radiculopathy     HTN (hypertension)     MEDIAL MENISCUS TEAR - left     OA (Osteoarthritis) of Knee - left     HYPERLIPIDEMIA LDL GOAL <130     Allergic rhinitis     Hyperlipidemia     Hypertension     Degenerative joint disease     Hypertension goal BP (blood pressure) < 140/90     Chronic fatigue     Joint ache     GERD (gastroesophageal reflux disease)     Chronic constipation     Advanced directives, counseling/discussion     Cervical radiculitis     Cervical radiculopathy     Post-traumatic osteoarthritis of left knee     Social History     Socioeconomic History     Marital status:      Spouse name: Sheridan     Number of children: 3     Years of education: None     Highest education level: None   Social Needs     Financial resource strain: None     Food insecurity - worry: None     Food insecurity - inability: None     Transportation needs - medical: None     Transportation needs - non-medical: None   Occupational History     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 Not Asked   Social History Narrative     None     Family History   Problem Relation Age of Onset     Cancer Mother         tumor on leg     Lipids Mother      Cancer Maternal Grandmother         lung     Cerebrovascular Disease Paternal Grandmother        ALLERGIES:  No known drug allergies    Current Outpatient Medications   Medication     aspirin 81 MG tablet     atorvastatin (LIPITOR) 40 MG tablet     losartan (COZAAR) 100 MG tablet     omeprazole (PRILOSEC) 40 MG capsule     sertraline (ZOLOFT) 50 MG tablet     hydrochlorothiazide 12.5 MG TABS tablet     montelukast (SINGULAIR) 10 MG tablet     No current facility-administered medications for this visit.          ROS:  ROS is done and is negative for general/constitutional, eye, ENT, Respiratory, cardiovascular, GI, , Skin, musculoskeletal except as noted elsewhere.  All other review of systems negative except as noted elsewhere.      OBJECTIVE:  BP (!) 145/97   Pulse 104   Temp 98  F (36.7  C) (Oral)   Ht 1.854 m (6' 1\")   Wt 104.3 kg (230 lb)   SpO2 96%   BMI 30.34 kg/m    GENERAL APPEARANCE: Alert, in no acute distress  EYES: normal  EARS: External ears normal. Canals clear. TM's normal.  NOSE:mild clear discharge and moderately inflamed mucosa  OROPHARYNX:normal  SINUSES:  Tender over frontal and maxillary sinuses  NECK:No adenopathy,masses or thyromegaly  RESP: normal and clear to auscultation, occasional cough  CV:regular rate and rhythm and no murmurs, clicks, or gallops  ABDOMEN: Abdomen soft, non-tender. BS normal. No masses, organomegaly  SKIN: no ulcers, lesions or rash  MUSCULOSKELETAL:Musculoskeletal normal      RESULTS  Results for orders placed or performed in visit on 07/25/18   CT Sinus " w/o Contrast    Narrative    CT SCAN OF THE PARANASAL SINUSES AND FACE  7/25/2018 8:30 AM     HISTORY:  Chronic maxillary sinusitis.    TECHNIQUE: Radiation dose for this scan was reduced using automated  exposure control, adjustment of the mA and/or kV according to patient  size, or iterative reconstruction technique.  Noncontrast axial scans  and coronal and sagittal reformations.      COMPARISON: None.    FINDINGS:  Frontal sinuses:  Extensive mucosal thickening is seen in the frontal  sinuses. The frontal sinuses are nearly completely opacified. The  frontal recesses are occluded.    Ethmoid sinuses:  Multiple ethmoid sinuses are completely opacified.    Right maxillary sinus:  Extensive mucosal thickening is present in the  right maxillary sinus. The right ostium and infundibulum are occluded.    Left maxillary sinus:  Extensive mucosal thickening is present in the  left maxillary sinus. The left ostium and infundibulum are occluded.    Sphenoid sinus:  Mild mucosal thickening is present in both sphenoid  sinuses.    Nasal septum:  Nasal septum is in the midline.    Turbinates and nasal cavity:   Normal.     Laminae papyracea and cribriform plate: Normal.     Other findings: Orbits, sella, maxilla and nasopharynx appear normal.   Bony temporomandibular joints appear normal.      Impression    IMPRESSION:  Pansinusitis.    SARAH BARLOW MD   .  No results found for this or any previous visit (from the past 48 hour(s)).    ASSESSMENT/PLAN:    ASSESSMENT / PLAN:  (J01.41) Acute recurrent pansinusitis  (primary encounter diagnosis)  Comment: pt has h/o sinus infections and sinus issues.  Will have him restart singulair as it seemed to help when the ENT had him on it in past.  Also tx with augmentin.  Plan: montelukast (SINGULAIR) 10 MG tablet,         amoxicillin-clavulanate (AUGMENTIN) 875-125 MG         tablet        Reviewed medication instructions and side effects. Follow up if experiences side effects.. I  reviewed supportive care, otc meds to use if needed, expected course, and signs of concern.  Follow up as needed or if he does not improve within 10 day(s) or if worsens in any way.  Reviewed red flag symptoms and is to go to the ER if experiences any of these.      (I10) Essential hypertension  Comment: BP today is above goal, which may be due to illness  Plan: continue current medications. Recheck BP in 1-2 weeks at pharmacy or with pcp.    See [facility] for orders, medications, letters, patient instructions    Mell Enrique M.D.

## 2019-03-02 ENCOUNTER — OFFICE VISIT (OUTPATIENT)
Dept: URGENT CARE | Facility: URGENT CARE | Age: 64
End: 2019-03-02
Payer: COMMERCIAL

## 2019-03-02 VITALS
SYSTOLIC BLOOD PRESSURE: 200 MMHG | TEMPERATURE: 98.4 F | HEART RATE: 99 BPM | DIASTOLIC BLOOD PRESSURE: 129 MMHG | BODY MASS INDEX: 30.03 KG/M2 | OXYGEN SATURATION: 96 % | WEIGHT: 227.6 LBS

## 2019-03-02 DIAGNOSIS — Z11.3 SCREEN FOR STD (SEXUALLY TRANSMITTED DISEASE): Primary | ICD-10-CM

## 2019-03-02 DIAGNOSIS — L02.219 CELLULITIS AND ABSCESS OF TRUNK: Primary | ICD-10-CM

## 2019-03-02 DIAGNOSIS — Z11.3 SCREEN FOR STD (SEXUALLY TRANSMITTED DISEASE): ICD-10-CM

## 2019-03-02 DIAGNOSIS — L03.319 CELLULITIS AND ABSCESS OF TRUNK: Primary | ICD-10-CM

## 2019-03-02 PROCEDURE — 86704 HEP B CORE ANTIBODY TOTAL: CPT | Performed by: INTERNAL MEDICINE

## 2019-03-02 PROCEDURE — 86803 HEPATITIS C AB TEST: CPT | Performed by: INTERNAL MEDICINE

## 2019-03-02 PROCEDURE — 0064U ANTB TP TOTAL&RPR IA QUAL: CPT | Performed by: INTERNAL MEDICINE

## 2019-03-02 PROCEDURE — 87389 HIV-1 AG W/HIV-1&-2 AB AG IA: CPT | Performed by: INTERNAL MEDICINE

## 2019-03-02 PROCEDURE — 86706 HEP B SURFACE ANTIBODY: CPT | Performed by: INTERNAL MEDICINE

## 2019-03-02 PROCEDURE — 87340 HEPATITIS B SURFACE AG IA: CPT | Performed by: INTERNAL MEDICINE

## 2019-03-02 PROCEDURE — 36415 COLL VENOUS BLD VENIPUNCTURE: CPT | Performed by: INTERNAL MEDICINE

## 2019-03-02 PROCEDURE — 99214 OFFICE O/P EST MOD 30 MIN: CPT | Performed by: INTERNAL MEDICINE

## 2019-03-02 RX ORDER — SULFAMETHOXAZOLE/TRIMETHOPRIM 800-160 MG
1 TABLET ORAL 2 TIMES DAILY
Qty: 20 TABLET | Refills: 0 | Status: SHIPPED | OUTPATIENT
Start: 2019-03-02 | End: 2019-05-13

## 2019-03-02 NOTE — PROGRESS NOTES
SUBJECTIVE:  Enrico Leos is an 63 year old male who presents for rash on lower abdomen.  Started about a week ago, hasn't changed much since onset.  Is like a big pimple.  No drainage that he's noticed.  Hurts more when touches it.  No fevers, chills, sweats.  No n/v/d. No cough or runny nose.  No other new skin spots or lesions.   No other areas of pain on skin.  No meds or creams used for it.  Hurts more when sits and jeans pinch the area.  No known std exposures and has been with one partner for a while.    PMH:   has a past medical history of Allergic rhinitis, cause unspecified, Chronic fatigue (9/9/2011), Degenerative joint disease, Hypertension, Lumbago, Other and unspecified hyperlipidemia, and Pure hypercholesterolemia.  Patient Active Problem List   Diagnosis     Mixed hyperlipidemia     Lumbar radiculopathy     HTN (hypertension)     MEDIAL MENISCUS TEAR - left     OA (Osteoarthritis) of Knee - left     HYPERLIPIDEMIA LDL GOAL <130     Allergic rhinitis     Hyperlipidemia     Hypertension     Degenerative joint disease     Hypertension goal BP (blood pressure) < 140/90     Chronic fatigue     Joint ache     GERD (gastroesophageal reflux disease)     Chronic constipation     Advanced directives, counseling/discussion     Cervical radiculitis     Cervical radiculopathy     Post-traumatic osteoarthritis of left knee     Social History     Socioeconomic History     Marital status:      Spouse name: Sheridan     Number of children: 3     Years of education: Not on file     Highest education level: Not on file   Occupational History     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[employer]     Employer: [employer]   Social Needs     Financial resource strain: Not on file     Food insecurity:     Worry: Not on file     Inability: Not on file     Transportation needs:     Medical: Not on file     Non-medical: Not on file   Tobacco Use     Smoking status: Never Smoker     Smokeless tobacco: Never Used   Substance and  Sexual Activity     Alcohol use: Yes     Comment: occasional     Drug use: No     Sexual activity: Yes     Partners: Female   Lifestyle     Physical activity:     Days per week: Not on file     Minutes per session: Not on file     Stress: Not on file   Relationships     Social connections:     Talks on phone: Not on file     Gets together: Not on file     Attends Anabaptism service: Not on file     Active member of club or organization: Not on file     Attends meetings of clubs or organizations: Not on file     Relationship status: Not on file     Intimate partner violence:     Fear of current or ex partner: Not on file     Emotionally abused: Not on file     Physically abused: Not on file     Forced sexual activity: Not on file   Other Topics Concern     Parent/sibling w/ CABG, MI or angioplasty before 65F 55M? Not Asked   Social History Narrative     Not on file     Family History   Problem Relation Age of Onset     Cancer Mother         tumor on leg     Lipids Mother      Cancer Maternal Grandmother         lung     Cerebrovascular Disease Paternal Grandmother        ALLERGIES:  No known drug allergies    Current Outpatient Medications   Medication     aspirin 81 MG tablet     atorvastatin (LIPITOR) 40 MG tablet     hydrochlorothiazide 12.5 MG TABS tablet     losartan (COZAAR) 100 MG tablet     omeprazole (PRILOSEC) 40 MG capsule     sertraline (ZOLOFT) 50 MG tablet     montelukast (SINGULAIR) 10 MG tablet     montelukast (SINGULAIR) 10 MG tablet     No current facility-administered medications for this visit.          ROS:  ROS is done and is negative for general/constitutional, eye, ENT, Respiratory, cardiovascular, GI, , Skin, musculoskeletal except as noted elsewhere.  All other review of systems negative except as noted elsewhere.      OBJECTIVE:  BP (!) 200/129   Pulse 99   Temp 98.4  F (36.9  C) (Oral)   Wt 103.2 kg (227 lb 9.6 oz)   SpO2 96%   BMI 30.03 kg/m    GENERAL APPEARANCE: Alert, in no acute  distress  EYES: normal  NOSE:normal  OROPHARYNX:normal  NECK:No adenopathy,masses or thyromegaly  RESP: normal and clear to auscultation  CV:regular rate and rhythm and no murmurs, clicks, or gallops  ABDOMEN: Abdomen soft, non-tender. BS normal. No masses, organomegaly  SKIN: in mid pubic region there is a 7mm area of erythema which is slightly raised, with small central opening, no drainage currently, mildly tender, mildly increased warmth to touch.  MUSCULOSKELETAL:Musculoskeletal normal      RESULTS  .  No results found for this or any previous visit (from the past 48 hour(s)).    ASSESSMENT/PLAN:    ASSESSMENT / PLAN:  (L03.319,  L02.219) Cellulitis and abscess of trunk  (primary encounter diagnosis)  Comment: exam c/w cellulitis with possible tiny abscess, may have started as ingrown hair.  Plan: sulfamethoxazole-trimethoprim (BACTRIM         DS/SEPTRA DS) 800-160 MG tablet        Reviewed medication instructions and side effects. Follow up if experiences side effects.. I reviewed supportive care, otc meds to use if needed, expected course, and signs of concern.  Follow up as needed or if he does not improve within 7 day(s) or if worsens in any way.  Reviewed red flag symptoms and is to go to the ER if experiences any of these.    (Z11.3) Screen for STD (sexually transmitted disease)  Comment: no known exposures but as long as he is here in clinic he figures he should be tested.   Plan: HIV Antigen Antibody Combo [MBC3032], Hepatitis        B core antibody [IFF6042], Hepatitis B Surface         Antibody [QMV2929], Hepatitis B surface antigen        [JDP342], Hepatitis C antibody [WNX156],         Treponema Abs w Reflex to RPR and Titer         [DJO8859], Chlamydia trachomatis PCR [KNA087],         Neisseria gonorrhoeae PCR [YRH4833]        Await results and treat as needed.  Reviewed std prevention.      See [facility] for orders, medications, letters, patient instructions    Mell Enrique M.D.

## 2019-03-03 DIAGNOSIS — Z11.3 SCREEN FOR STD (SEXUALLY TRANSMITTED DISEASE): ICD-10-CM

## 2019-03-03 PROCEDURE — 87491 CHLMYD TRACH DNA AMP PROBE: CPT | Performed by: INTERNAL MEDICINE

## 2019-03-03 PROCEDURE — 87591 N.GONORRHOEAE DNA AMP PROB: CPT | Performed by: INTERNAL MEDICINE

## 2019-03-04 LAB
C TRACH DNA SPEC QL NAA+PROBE: NEGATIVE
HBV CORE AB SERPL QL IA: NONREACTIVE
HBV SURFACE AB SERPL IA-ACNC: 0 M[IU]/ML
HBV SURFACE AG SERPL QL IA: NONREACTIVE
HCV AB SERPL QL IA: NONREACTIVE
HIV 1+2 AB+HIV1 P24 AG SERPL QL IA: NONREACTIVE
N GONORRHOEA DNA SPEC QL NAA+PROBE: NEGATIVE
SPECIMEN SOURCE: NORMAL
SPECIMEN SOURCE: NORMAL
T PALLIDUM AB SER QL: NONREACTIVE

## 2019-03-29 DIAGNOSIS — K21.00 GASTROESOPHAGEAL REFLUX DISEASE WITH ESOPHAGITIS: ICD-10-CM

## 2019-03-29 RX ORDER — OMEPRAZOLE 40 MG/1
CAPSULE, DELAYED RELEASE ORAL
Qty: 90 CAPSULE | Refills: 1 | Status: SHIPPED | OUTPATIENT
Start: 2019-03-29 | End: 2019-11-20

## 2019-04-01 DIAGNOSIS — J01.41 ACUTE RECURRENT PANSINUSITIS: ICD-10-CM

## 2019-04-01 DIAGNOSIS — J32.4 CHRONIC PANSINUSITIS: ICD-10-CM

## 2019-04-01 RX ORDER — MONTELUKAST SODIUM 10 MG/1
10 TABLET ORAL AT BEDTIME
Qty: 90 TABLET | Refills: 0 | Status: SHIPPED | OUTPATIENT
Start: 2019-04-01 | End: 2019-07-01

## 2019-04-19 DIAGNOSIS — E78.2 MIXED HYPERLIPIDEMIA: ICD-10-CM

## 2019-04-19 RX ORDER — ATORVASTATIN CALCIUM 40 MG/1
TABLET, FILM COATED ORAL
Qty: 30 TABLET | Refills: 0 | Status: SHIPPED | OUTPATIENT
Start: 2019-04-19 | End: 2019-05-11

## 2019-05-01 ENCOUNTER — TELEPHONE (OUTPATIENT)
Dept: FAMILY MEDICINE | Facility: CLINIC | Age: 64
End: 2019-05-01

## 2019-05-01 NOTE — TELEPHONE ENCOUNTER
Panel Management Review      Patient has the following on his problem list:     Hypertension   Last three blood pressure readings:  BP Readings from Last 3 Encounters:   03/02/19 (!) 200/129   01/02/19 (!) 145/97   08/10/18 126/90     Blood pressure: FAILED    HTN Guidelines:  Less than 140/90      Composite cancer screening  Chart review shows that this patient is due/due soon for the following None  Summary:    Patient is due/failing the following:   BP CHECK    Action needed:   Patient needs office visit for blood pressure check .    Type of outreach:    Sent letter.    Questions for provider review:    None                                                                                                                                    Macy Perkins M.A.       Chart routed to n/a .

## 2019-05-01 NOTE — LETTER
[facility]  [address] [facility] [address] [facility] [address]  Phone: [phone]          Enrico Leos  [address] [address]          May 1, 2019          DeaNamrata Leos      Our records indicate that you have not scheduled for a(n)Blood pressure check  which was recommended by your health care team. Monitoring and managing your preventative and chronic health conditions are very important to us.       If you have received your health care elsewhere, please provide us with that information so it can be documented in your chart.    Please call [phone] or message us through your [employer] account to schedule an appointment or provide information for your chart.     We look forward to seeing you and working with you on your health care needs.     Sincerely,   Josh Carter MD          *If you have already scheduled an appointment, please disregard this reminder

## 2019-05-07 ENCOUNTER — TELEPHONE (OUTPATIENT)
Dept: FAMILY MEDICINE | Facility: CLINIC | Age: 64
End: 2019-05-07

## 2019-05-07 NOTE — TELEPHONE ENCOUNTER
Panel Management Review      Patient has the following on his problem list:   Hypertension   Last three blood pressure readings:  BP Readings from Last 3 Encounters:   03/02/19 (!) 200/129   01/02/19 (!) 145/97   08/10/18 126/90     Blood pressure: FAILED    HTN Guidelines:  Less than 140/90      Composite cancer screening  Chart review shows that this patient is due/due soon for the following None  Summary:    Patient is due/failing the following:   Letter already sent    Action needed:   none    Type of outreach:    none needed    Questions for provider review:    None                                                                                                                                    Macy Perkins M.A.       Chart routed to n/a .

## 2019-05-11 DIAGNOSIS — E78.2 MIXED HYPERLIPIDEMIA: ICD-10-CM

## 2019-05-13 RX ORDER — ATORVASTATIN CALCIUM 40 MG/1
TABLET, FILM COATED ORAL
Qty: 30 TABLET | Refills: 0 | Status: SHIPPED | OUTPATIENT
Start: 2019-05-13 | End: 2019-11-20

## 2019-05-13 NOTE — TELEPHONE ENCOUNTER
Routing refill request to provider for review/approval because:  Eleonora given x1 and patient did not have lipids done.  No appointment pending at this time.  Routing to provider to advise.    Sarina Keith RN, BSN

## 2019-05-16 DIAGNOSIS — M54.16 LUMBAR RADICULOPATHY: ICD-10-CM

## 2019-06-20 DIAGNOSIS — I10 ESSENTIAL HYPERTENSION WITH GOAL BLOOD PRESSURE LESS THAN 140/90: ICD-10-CM

## 2019-06-20 RX ORDER — LOSARTAN POTASSIUM 100 MG/1
TABLET ORAL
Qty: 30 TABLET | Refills: 0 | Status: SHIPPED | OUTPATIENT
Start: 2019-06-20 | End: 2019-07-17

## 2019-06-20 NOTE — TELEPHONE ENCOUNTER
Medication is being filled for 1 time refill only due to:  pt needs lab and provider appt for blood pressure        - please send letter  Angela HESTERN, RN, CPN

## 2019-06-20 NOTE — LETTER
June 20, 2019    Enrico Leso  [address] [address]    Dear Enrico,       We recently received a refill request for Losartan (COZAAR) 100 MG tablet.  We have refilled this for a one time 30 day supply only because you are due for a:    Hypertension office visit and Lab appointment      Please schedule this lab appointment 4-5 days prior to the office visit.     Please call at your earliest convenience so that there will not be a delay with your future refills.          Thank you,   Your [facility] Team/mkr  [phone]

## 2019-06-26 DIAGNOSIS — E78.2 MIXED HYPERLIPIDEMIA: ICD-10-CM

## 2019-06-26 RX ORDER — ATORVASTATIN CALCIUM 40 MG/1
TABLET, FILM COATED ORAL
Qty: 90 TABLET | Refills: 1 | Status: SHIPPED | OUTPATIENT
Start: 2019-06-26 | End: 2019-11-20

## 2019-06-26 NOTE — TELEPHONE ENCOUNTER
Atorvastatin refill request  Last refilled: 5/13 for 30 days; letter sent to patient to schedule appointment 5/1 and 6/20.  Last OV Dr. Josh Carter: 7/17/18  Lab Results   Component Value Date    CHOL 160 05/17/2018     Lab Results   Component Value Date    HDL 40 05/17/2018     Lab Results   Component Value Date    LDL 90 05/17/2018     Lab Results   Component Value Date    TRIG 148 05/17/2018     Lab Results   Component Value Date    CHOLHDLRATIO 4.6 10/20/2015

## 2019-07-01 DIAGNOSIS — J01.41 ACUTE RECURRENT PANSINUSITIS: ICD-10-CM

## 2019-07-01 RX ORDER — MONTELUKAST SODIUM 10 MG/1
10 TABLET ORAL AT BEDTIME
Qty: 90 TABLET | Refills: 0 | Status: SHIPPED | OUTPATIENT
Start: 2019-07-01 | End: 2020-03-09

## 2019-07-17 DIAGNOSIS — I10 ESSENTIAL HYPERTENSION WITH GOAL BLOOD PRESSURE LESS THAN 140/90: ICD-10-CM

## 2019-07-17 RX ORDER — LOSARTAN POTASSIUM 100 MG/1
TABLET ORAL
Qty: 30 TABLET | Refills: 0 | Status: SHIPPED | OUTPATIENT
Start: 2019-07-17 | End: 2019-11-20

## 2019-07-17 NOTE — TELEPHONE ENCOUNTER
Losartan refill request, overdue for appointment.  Letters were sent in May and June instructing needs to be seen for his hypertension.  30 day refill given in June.  Last OV Dr. Josh Carter was July 2018 for neck pain  Saw Dr. Enrique 1/2 and 3/2/19 for acute care needs only  BP has been elevated;  Due for bmp, lipid panel  BP Readings from Last 3 Encounters:   03/02/19 (!) 200/129   01/02/19 (!) 145/97   08/10/18 126/90     Last Comprehensive Metabolic Panel:  Sodium   Date Value Ref Range Status   05/17/2018 140 133 - 144 mmol/L Final     Potassium   Date Value Ref Range Status   05/17/2018 4.2 3.4 - 5.3 mmol/L Final     Chloride   Date Value Ref Range Status   05/17/2018 106 94 - 109 mmol/L Final     Carbon Dioxide   Date Value Ref Range Status   05/17/2018 29 20 - 32 mmol/L Final     Anion Gap   Date Value Ref Range Status   05/17/2018 5 3 - 14 mmol/L Final     Glucose   Date Value Ref Range Status   05/17/2018 126 (H) 70 - 99 mg/dL Final     Comment:     Fasting specimen     Urea Nitrogen   Date Value Ref Range Status   05/17/2018 13 7 - 30 mg/dL Final     Creatinine   Date Value Ref Range Status   05/17/2018 1.07 0.66 - 1.25 mg/dL Final     GFR Estimate   Date Value Ref Range Status   05/17/2018 70 >60 mL/min/1.7m2 Final     Comment:     Non  GFR Calc     Calcium   Date Value Ref Range Status   05/17/2018 9.0 8.5 - 10.1 mg/dL Final

## 2019-07-24 ENCOUNTER — TELEPHONE (OUTPATIENT)
Dept: FAMILY MEDICINE | Facility: CLINIC | Age: 64
End: 2019-07-24

## 2019-07-24 NOTE — TELEPHONE ENCOUNTER
Disability form placed on providers shelf to complete. Route back to team when done.  CORKY Ledesma

## 2019-08-24 DIAGNOSIS — M54.16 LUMBAR RADICULOPATHY: ICD-10-CM

## 2019-08-27 ENCOUNTER — TELEPHONE (OUTPATIENT)
Dept: FAMILY MEDICINE | Facility: CLINIC | Age: 64
End: 2019-08-27

## 2019-08-27 NOTE — TELEPHONE ENCOUNTER
Forms received via fax requesting OV notes and any testing done from 01/01/2019 to present. 2 office visit notes and Labs sent. Faxed back to [phone] CORKY Roach

## 2019-08-28 ENCOUNTER — TELEPHONE (OUTPATIENT)
Dept: FAMILY MEDICINE | Facility: CLINIC | Age: 64
End: 2019-08-28

## 2019-08-28 DIAGNOSIS — I15.9 SECONDARY HYPERTENSION: ICD-10-CM

## 2019-08-28 RX ORDER — HYDROCHLOROTHIAZIDE 12.5 MG/1
12.5 TABLET ORAL DAILY
Qty: 30 TABLET | Refills: 1 | Status: SHIPPED | OUTPATIENT
Start: 2019-08-28 | End: 2019-09-28

## 2019-08-28 NOTE — TELEPHONE ENCOUNTER
Multiple letters sent to pt to schedule an appointment, no appointment made.  Last ov 7/17/18.  Betty HESTERN, RN

## 2019-09-18 DIAGNOSIS — E78.2 MIXED HYPERLIPIDEMIA: Primary | ICD-10-CM

## 2019-09-18 DIAGNOSIS — I10 ESSENTIAL HYPERTENSION: ICD-10-CM

## 2019-09-27 ENCOUNTER — HEALTH MAINTENANCE LETTER (OUTPATIENT)
Age: 64
End: 2019-09-27

## 2019-09-28 DIAGNOSIS — I15.9 SECONDARY HYPERTENSION: ICD-10-CM

## 2019-09-30 RX ORDER — HYDROCHLOROTHIAZIDE 12.5 MG/1
TABLET ORAL
Qty: 30 TABLET | Refills: 1 | Status: SHIPPED | OUTPATIENT
Start: 2019-09-30 | End: 2019-11-20

## 2019-09-30 NOTE — TELEPHONE ENCOUNTER
Requested Prescriptions   Pending Prescriptions Disp Refills     hydrochlorothiazide (HYDRODIURIL) 12.5 MG tablet [Pharmacy Med Name: HYDROCHLOROTHIAZIDE 12.5 MG TB] 30 tablet 1     Sig: TAKE 1 TABLET BY MOUTH EVERY DAY       Diuretics (Including Combos) Protocol Failed - 9/28/2019 11:34 AM        Failed - Blood pressure under 140/90 in past 12 months     BP Readings from Last 3 Encounters:   03/02/19 (!) 200/129   01/02/19 (!) 145/97   08/10/18 126/90                 Failed - Normal serum creatinine on file in past 12 months     Recent Labs   Lab Test 05/17/18  0946   CR 1.07              Failed - Normal serum potassium on file in past 12 months     Recent Labs   Lab Test 05/17/18  0946   POTASSIUM 4.2                    Failed - Normal serum sodium on file in past 12 months     Recent Labs   Lab Test 05/17/18  0946              No appointment pending at this time.  Routing to provider to advise.    Sarina Keith BSN, RN

## 2019-10-02 DIAGNOSIS — E78.2 MIXED HYPERLIPIDEMIA: ICD-10-CM

## 2019-10-02 DIAGNOSIS — I10 ESSENTIAL HYPERTENSION: ICD-10-CM

## 2019-10-02 LAB
ANION GAP SERPL CALCULATED.3IONS-SCNC: 7 MMOL/L (ref 3–14)
BUN SERPL-MCNC: 14 MG/DL (ref 7–30)
CALCIUM SERPL-MCNC: 9.3 MG/DL (ref 8.5–10.1)
CHLORIDE SERPL-SCNC: 104 MMOL/L (ref 94–109)
CHOLEST SERPL-MCNC: 187 MG/DL
CO2 SERPL-SCNC: 27 MMOL/L (ref 20–32)
CREAT SERPL-MCNC: 0.98 MG/DL (ref 0.66–1.25)
GFR SERPL CREATININE-BSD FRML MDRD: 83 ML/MIN/{1.73_M2}
GLUCOSE SERPL-MCNC: 118 MG/DL (ref 70–99)
HDLC SERPL-MCNC: 38 MG/DL
LDLC SERPL CALC-MCNC: 92 MG/DL
NONHDLC SERPL-MCNC: 149 MG/DL
POTASSIUM SERPL-SCNC: 4.2 MMOL/L (ref 3.4–5.3)
SODIUM SERPL-SCNC: 138 MMOL/L (ref 133–144)
TRIGL SERPL-MCNC: 287 MG/DL

## 2019-10-02 PROCEDURE — 80048 BASIC METABOLIC PNL TOTAL CA: CPT | Performed by: FAMILY MEDICINE

## 2019-10-02 PROCEDURE — 36415 COLL VENOUS BLD VENIPUNCTURE: CPT | Performed by: FAMILY MEDICINE

## 2019-10-02 PROCEDURE — 80061 LIPID PANEL: CPT | Performed by: FAMILY MEDICINE

## 2019-10-15 DIAGNOSIS — S46.912A STRAIN OF LEFT SHOULDER, INITIAL ENCOUNTER: Primary | ICD-10-CM

## 2019-10-24 DIAGNOSIS — I15.9 SECONDARY HYPERTENSION: ICD-10-CM

## 2019-10-24 RX ORDER — HYDROCHLOROTHIAZIDE 12.5 MG/1
TABLET ORAL
Qty: 30 TABLET | Refills: 1 | OUTPATIENT
Start: 2019-10-24

## 2019-10-30 ENCOUNTER — ANCILLARY PROCEDURE (OUTPATIENT)
Dept: GENERAL RADIOLOGY | Facility: CLINIC | Age: 64
End: 2019-10-30
Attending: FAMILY MEDICINE
Payer: MEDICARE

## 2019-10-30 ENCOUNTER — OFFICE VISIT (OUTPATIENT)
Dept: ORTHOPEDICS | Facility: CLINIC | Age: 64
End: 2019-10-30
Payer: MEDICARE

## 2019-10-30 VITALS
SYSTOLIC BLOOD PRESSURE: 131 MMHG | BODY MASS INDEX: 30.35 KG/M2 | WEIGHT: 229 LBS | HEIGHT: 73 IN | DIASTOLIC BLOOD PRESSURE: 91 MMHG

## 2019-10-30 DIAGNOSIS — G89.29 CHRONIC LEFT SHOULDER PAIN: ICD-10-CM

## 2019-10-30 DIAGNOSIS — M25.512 CHRONIC LEFT SHOULDER PAIN: Primary | ICD-10-CM

## 2019-10-30 DIAGNOSIS — M75.102 ROTATOR CUFF SYNDROME OF LEFT SHOULDER: ICD-10-CM

## 2019-10-30 DIAGNOSIS — M25.512 CHRONIC LEFT SHOULDER PAIN: ICD-10-CM

## 2019-10-30 DIAGNOSIS — G89.29 CHRONIC LEFT SHOULDER PAIN: Primary | ICD-10-CM

## 2019-10-30 PROCEDURE — 20611 DRAIN/INJ JOINT/BURSA W/US: CPT | Mod: LT | Performed by: FAMILY MEDICINE

## 2019-10-30 PROCEDURE — 99204 OFFICE O/P NEW MOD 45 MIN: CPT | Mod: 25 | Performed by: FAMILY MEDICINE

## 2019-10-30 PROCEDURE — 73030 X-RAY EXAM OF SHOULDER: CPT | Mod: LT

## 2019-10-30 RX ORDER — ROPIVACAINE HYDROCHLORIDE 5 MG/ML
3 INJECTION, SOLUTION EPIDURAL; INFILTRATION; PERINEURAL
Status: DISCONTINUED | OUTPATIENT
Start: 2019-10-30 | End: 2019-12-26

## 2019-10-30 RX ORDER — TRIAMCINOLONE ACETONIDE 40 MG/ML
40 INJECTION, SUSPENSION INTRA-ARTICULAR; INTRAMUSCULAR
Status: DISCONTINUED | OUTPATIENT
Start: 2019-10-30 | End: 2019-12-26

## 2019-10-30 RX ADMIN — TRIAMCINOLONE ACETONIDE 40 MG: 40 INJECTION, SUSPENSION INTRA-ARTICULAR; INTRAMUSCULAR at 15:40

## 2019-10-30 RX ADMIN — ROPIVACAINE HYDROCHLORIDE 3 ML: 5 INJECTION, SOLUTION EPIDURAL; INFILTRATION; PERINEURAL at 15:40

## 2019-10-30 ASSESSMENT — MIFFLIN-ST. JEOR: SCORE: 1892.62

## 2019-10-30 NOTE — LETTER
10/30/2019         RE: Enrico Leos  [address]  [place] [address]        Dear Colleague,    Thank you for referring your patient, Enrico Leos, to the [facility] [place]. Please see a copy of my visit note below.    Enrico Leos  :  1955  DOS: 10/30/2019  MRN: [id number]    Sports Medicine Clinic Visit    PCP: Josh Carter    Enrico Leos is a 64 year old Right hand dominant male who is seen in consultation at the request of  Josh Carter M.D. presenting with chronic left shoulder pain.    Injury: Gradual onset of chronic radiating left shoulder pain over the past ~ 3+ years, pain worse over last 6 months.  Pain located over left deep anterior, posterior glenohumeral joint, radiating to left upper arm.  Reports intermittent radiating, pain to left lateral deltoid.  Additional Features:  Positive: grinding, weakness and upper trapezius tightness.  Symptoms are better with Rest.  Symptoms are worse with: shoulder flexion/abduction, lifting, lying on left shoulder, reaching.  Other evaluation and/or treatments so far consists of: Tylenol, Ibuprofen and Rest.  Recent imaging completed: No recent imaging completed, MRI of c-spine completed 2018.  Prior History of related problems: history of chronic shoulder and c-spine pain.  Has spinal stimulator in lumbar spine due to chronic low back pain.    Social History: retired     Review of Systems  Musculoskeletal: as above  Remainder of review of systems is negative including constitutional, CV, pulmonary, GI, Skin and Neurologic except as noted in HPI or medical history.    Past Medical History:   Diagnosis Date     Allergic rhinitis, cause unspecified     Allergic rhinitis     Chronic fatigue 2011     Degenerative joint disease      Hypertension      Lumbago      Other and unspecified hyperlipidemia      Pure hypercholesterolemia      Past Surgical History:   Procedure Laterality Date      "C BIOPSY SYNOVIUM KNEE JOINT  1977    Semilunar Cartilage Excision, Knee     C MONZON W/O FACETEC FORAMOT/[facility] 1/2 VRT SEG, LUMBAR  1986    Lami, Lumbar     C STOMACH SURGERY PROCEDURE UNLISTED      infant     HC KNEE SCOPE,MED/LAT MENISECTOMY  8/25/10    WORK COMP, medial      HC REPAIR INCISIONAL HERNIA,REDUCIBLE      Hernia Repair, Incisional, Bilateral     HC SACROPLASTY       REMOVAL OF SPERM DUCT(S)  1992    Vasectomy     Family History   Problem Relation Age of Onset     Cancer Mother         tumor on leg     Lipids Mother      Cancer Maternal Grandmother         lung     Cerebrovascular Disease Paternal Grandmother        Objective  BP (!) 131/91   Ht 1.854 m (6' 1\")   Wt 103.9 kg (229 lb)   BMI 30.21 kg/m         General: healthy, alert and in no distress      HEENT: no scleral icterus or conjunctival erythema     Skin: no suspicious lesions or rash. No jaundice.     CV: regular rhythm by palpation, 2+ distal pulses, no pedal edema      Resp: normal respiratory effort without conversational dyspnea     Psych: normal mood and affect      Gait: nonantalgic, appropriate coordination and balance     Neuro: normal light touch sensory exam of the extremities. Motor strength as noted below     Left Shoulder exam    ROM:        Full active and passive ROM with flexion, extension, abduction, internal and external rotation.       asymmetric scapular motion on left due to dysfunction       Painful terminal flexion and abduction, mildly in ER    Tender:        subacromial space       Lateral deltoid    Non Tender:       remainder of shoulder       sternoclavicular joint       acromioclavicular joint       posterior shoulder       periscapular region    Strength:        abduction 5-/5       internal rotation 5/5       external rotation 5/5       adduction 5/5    Impingement testing:        positive (+) Neer       positive (+) Rizvi       positive (+) empty can       neg (-) crossover       neg (-) O'monica       neg " (-) crank    Stability testing:       neg (-) relocation       neg (-) anterior glide       neg (-) sulcus sign    Skin:       no visible deformities       well perfused       capillary refill brisk    Sensation:        normal sensation over shoulder and upper extremity       Radiology  Recent Results (from the past 744 hour(s))   XR Shoulder Left G/E 3 Views    Narrative    SHOULDER LEFT THREE OR MORE VIEWS    10/30/2019 2:49 PM     HISTORY: Chronic left shoulder pain.     COMPARISON: None.      Impression    IMPRESSION: Mild to moderate acromioclavicular degenerative changes.  Glenohumeral joint is unremarkable. No evidence of acute fracture or  subluxation.    ADEEL FENTON MD       Large Joint Injection/Arthocentesis: L subacromial bursa  Date/Time: 10/30/2019 3:40 PM  Performed by: Enrico Tate DO  Authorized by: Enrico Tate DO     Indications:  Pain  Needle Size:  22 G  Guidance: ultrasound    Approach:  Lateral  Location:  Shoulder      Site:  L subacromial bursa  Medications:  40 mg triamcinolone 40 MG/ML; 3 mL ropivacaine 5 MG/ML  Outcome:  Tolerated well, no immediate complications  Procedure discussed: discussed risks, benefits, and alternatives    Consent Given by:  Patient  Timeout: timeout called immediately prior to procedure    Prep: patient was prepped and draped in usual sterile fashion        Assessment:  1. Chronic left shoulder pain    2. Rotator cuff syndrome of left shoulder        Plan:  Discussed the assessment with the patient.  Follow up: prn based on clinical progress  No clear cervical symptoms today  Rotator cuff predominant symptoms more than glenohumeral joint on exam  XR images independently visualized and reviewed with patient today in clinic  Mild AC degenerative joint changes, not clinically relevant  Trial of ultrasound-guided subacromial injection with good initial relief from anesthetic effect today  Home exercises reviewed  Physical therapy options  provided, he declines these for now  Expectations and goals of CSI reviewed  Often 2-3 days for steroid effect, and can take up to two weeks for maximum effect  We discussed modified progressive pain-free activity as tolerated  Do not overuse in first two weeks if feeling better due to concern for vulnerability while steroid is working  Supportive care reviewed  All questions were answered today  Contact us with additional questions or concerns  Signs and sx of concern reviewed    Thanks very much for sending this nice gentleman to us, I will keep you updated with his progress      Enrico Tate DO, CAANABELLA  Primary Care Sports Medicine  [facility]         Disclaimer: This note consists of symbols derived from keyboarding, dictation and/or voice recognition software. As a result, there may be errors in the script that have gone undetected. Please consider this when interpreting information found in this chart.    Again, thank you for allowing me to participate in the care of your patient.        Sincerely,        Enrico Tate DO

## 2019-10-30 NOTE — PROGRESS NOTES
Enrico Leos  :  1955  DOS: 10/30/2019  MRN: [id number]    Sports Medicine Clinic Visit    PCP: Josh Carter    Enrico Leos is a 64 year old Right hand dominant male who is seen in consultation at the request of  Josh Carter M.D. presenting with chronic left shoulder pain.    Injury: Gradual onset of chronic radiating left shoulder pain over the past ~ 3+ years, pain worse over last 6 months.  Pain located over left deep anterior, posterior glenohumeral joint, radiating to left upper arm.  Reports intermittent radiating, pain to left lateral deltoid.  Additional Features:  Positive: grinding, weakness and upper trapezius tightness.  Symptoms are better with Rest.  Symptoms are worse with: shoulder flexion/abduction, lifting, lying on left shoulder, reaching.  Other evaluation and/or treatments so far consists of: Tylenol, Ibuprofen and Rest.  Recent imaging completed: No recent imaging completed, MRI of c-spine completed 2018.  Prior History of related problems: history of chronic shoulder and c-spine pain.  Has spinal stimulator in lumbar spine due to chronic low back pain.    Social History: retired     Review of Systems  Musculoskeletal: as above  Remainder of review of systems is negative including constitutional, CV, pulmonary, GI, Skin and Neurologic except as noted in HPI or medical history.    Past Medical History:   Diagnosis Date     Allergic rhinitis, cause unspecified     Allergic rhinitis     Chronic fatigue 2011     Degenerative joint disease      Hypertension      Lumbago      Other and unspecified hyperlipidemia      Pure hypercholesterolemia      Past Surgical History:   Procedure Laterality Date     C BIOPSY SYNOVIUM KNEE JOINT      Semilunar Cartilage Excision, Knee     C MONZON W/O FACETEC FORAMOT/[facility]  VRT SEG, LUMBAR      Lami, Lumbar     C STOMACH SURGERY PROCEDURE UNLISTED      infant     HC KNEE SCOPE,MED/LAT MENISECTOMY  8/25/10    WORK COMP,  "medial      HC REPAIR INCISIONAL HERNIA,REDUCIBLE      Hernia Repair, Incisional, Bilateral     HC SACROPLASTY       REMOVAL OF SPERM DUCT(S)  1992    Vasectomy     Family History   Problem Relation Age of Onset     Cancer Mother         tumor on leg     Lipids Mother      Cancer Maternal Grandmother         lung     Cerebrovascular Disease Paternal Grandmother        Objective  BP (!) 131/91   Ht 1.854 m (6' 1\")   Wt 103.9 kg (229 lb)   BMI 30.21 kg/m        General: healthy, alert and in no distress      HEENT: no scleral icterus or conjunctival erythema     Skin: no suspicious lesions or rash. No jaundice.     CV: regular rhythm by palpation, 2+ distal pulses, no pedal edema      Resp: normal respiratory effort without conversational dyspnea     Psych: normal mood and affect      Gait: nonantalgic, appropriate coordination and balance     Neuro: normal light touch sensory exam of the extremities. Motor strength as noted below     Left Shoulder exam    ROM:        Full active and passive ROM with flexion, extension, abduction, internal and external rotation.       asymmetric scapular motion on left due to dysfunction       Painful terminal flexion and abduction, mildly in ER    Tender:        subacromial space       Lateral deltoid    Non Tender:       remainder of shoulder       sternoclavicular joint       acromioclavicular joint       posterior shoulder       periscapular region    Strength:        abduction 5-/5       internal rotation 5/5       external rotation 5/5       adduction 5/5    Impingement testing:        positive (+) Neer       positive (+) Rizvi       positive (+) empty can       neg (-) crossover       neg (-) O'monica       neg (-) crank    Stability testing:       neg (-) relocation       neg (-) anterior glide       neg (-) sulcus sign    Skin:       no visible deformities       well perfused       capillary refill brisk    Sensation:        normal sensation over shoulder and upper " extremity       Radiology  Recent Results (from the past 744 hour(s))   XR Shoulder Left G/E 3 Views    Narrative    SHOULDER LEFT THREE OR MORE VIEWS    10/30/2019 2:49 PM     HISTORY: Chronic left shoulder pain.     COMPARISON: None.      Impression    IMPRESSION: Mild to moderate acromioclavicular degenerative changes.  Glenohumeral joint is unremarkable. No evidence of acute fracture or  subluxation.    ADEEL FENTON MD       Large Joint Injection/Arthocentesis: L subacromial bursa  Date/Time: 10/30/2019 3:40 PM  Performed by: Enrico Tate DO  Authorized by: Enrico Tate DO     Indications:  Pain  Needle Size:  22 G  Guidance: ultrasound    Approach:  Lateral  Location:  Shoulder      Site:  L subacromial bursa  Medications:  40 mg triamcinolone 40 MG/ML; 3 mL ropivacaine 5 MG/ML  Outcome:  Tolerated well, no immediate complications  Procedure discussed: discussed risks, benefits, and alternatives    Consent Given by:  Patient  Timeout: timeout called immediately prior to procedure    Prep: patient was prepped and draped in usual sterile fashion        Assessment:  1. Chronic left shoulder pain    2. Rotator cuff syndrome of left shoulder        Plan:  Discussed the assessment with the patient.  Follow up: prn based on clinical progress  No clear cervical symptoms today  Rotator cuff predominant symptoms more than glenohumeral joint on exam  XR images independently visualized and reviewed with patient today in clinic  Mild AC degenerative joint changes, not clinically relevant  Trial of ultrasound-guided subacromial injection with good initial relief from anesthetic effect today  Home exercises reviewed  Physical therapy options provided, he declines these for now  Expectations and goals of CSI reviewed  Often 2-3 days for steroid effect, and can take up to two weeks for maximum effect  We discussed modified progressive pain-free activity as tolerated  Do not overuse in first two weeks if  feeling better due to concern for vulnerability while steroid is working  Supportive care reviewed  All questions were answered today  Contact us with additional questions or concerns  Signs and sx of concern reviewed    Thanks very much for sending this nice gentleman to us, I will keep you updated with his progress      Enrico Tate DO, CAQ  Primary Care Sports Medicine  [facility]         Disclaimer: This note consists of symbols derived from keyboarding, dictation and/or voice recognition software. As a result, there may be errors in the script that have gone undetected. Please consider this when interpreting information found in this chart.

## 2019-11-20 ENCOUNTER — OFFICE VISIT (OUTPATIENT)
Dept: FAMILY MEDICINE | Facility: CLINIC | Age: 64
End: 2019-11-20
Payer: COMMERCIAL

## 2019-11-20 VITALS
HEIGHT: 72 IN | HEART RATE: 103 BPM | SYSTOLIC BLOOD PRESSURE: 113 MMHG | DIASTOLIC BLOOD PRESSURE: 72 MMHG | RESPIRATION RATE: 18 BRPM | TEMPERATURE: 98.3 F | BODY MASS INDEX: 30.07 KG/M2 | WEIGHT: 222 LBS

## 2019-11-20 DIAGNOSIS — I10 ESSENTIAL HYPERTENSION WITH GOAL BLOOD PRESSURE LESS THAN 140/90: ICD-10-CM

## 2019-11-20 DIAGNOSIS — E78.2 MIXED HYPERLIPIDEMIA: ICD-10-CM

## 2019-11-20 DIAGNOSIS — Z12.5 SCREENING FOR PROSTATE CANCER: ICD-10-CM

## 2019-11-20 DIAGNOSIS — K21.00 GASTROESOPHAGEAL REFLUX DISEASE WITH ESOPHAGITIS: ICD-10-CM

## 2019-11-20 DIAGNOSIS — R97.20 ELEVATED PROSTATE SPECIFIC ANTIGEN (PSA): ICD-10-CM

## 2019-11-20 DIAGNOSIS — Z00.00 ROUTINE GENERAL MEDICAL EXAMINATION AT A HEALTH CARE FACILITY: Primary | ICD-10-CM

## 2019-11-20 DIAGNOSIS — I15.9 SECONDARY HYPERTENSION: ICD-10-CM

## 2019-11-20 LAB — PSA SERPL-ACNC: 5.94 UG/L (ref 0–4)

## 2019-11-20 PROCEDURE — 36415 COLL VENOUS BLD VENIPUNCTURE: CPT | Performed by: FAMILY MEDICINE

## 2019-11-20 PROCEDURE — 99396 PREV VISIT EST AGE 40-64: CPT | Performed by: FAMILY MEDICINE

## 2019-11-20 PROCEDURE — G0103 PSA SCREENING: HCPCS | Performed by: FAMILY MEDICINE

## 2019-11-20 RX ORDER — LOSARTAN POTASSIUM 100 MG/1
100 TABLET ORAL DAILY
Qty: 90 TABLET | Refills: 3 | Status: SHIPPED | OUTPATIENT
Start: 2019-11-20 | End: 2020-03-09

## 2019-11-20 RX ORDER — OMEPRAZOLE 40 MG/1
40 CAPSULE, DELAYED RELEASE ORAL DAILY
Qty: 90 CAPSULE | Refills: 3 | Status: SHIPPED | OUTPATIENT
Start: 2019-11-20 | End: 2022-01-12

## 2019-11-20 RX ORDER — HYDROCHLOROTHIAZIDE 12.5 MG/1
12.5 TABLET ORAL DAILY
Qty: 90 TABLET | Refills: 3 | Status: SHIPPED | OUTPATIENT
Start: 2019-11-20 | End: 2020-03-09

## 2019-11-20 RX ORDER — ATORVASTATIN CALCIUM 40 MG/1
40 TABLET, FILM COATED ORAL DAILY
Qty: 90 TABLET | Refills: 3 | Status: SHIPPED | OUTPATIENT
Start: 2019-11-20 | End: 2021-01-12

## 2019-11-20 ASSESSMENT — MIFFLIN-ST. JEOR: SCORE: 1844.99

## 2019-11-20 NOTE — PROGRESS NOTES
SUBJECTIVE:   CC: Enrico Leos is an 64 year old male who presents for preventative health visit.     HPI        SUBJECTIVE:  64 year old enters for recheck of high cholesterol.  Pt. Has been taking med and has no side effects. Pt is following diet.  Denies chest pain and SOB.  Past Medical History:   Diagnosis Date     Allergic rhinitis, cause unspecified     Allergic rhinitis     Chronic fatigue 9/9/2011     Degenerative joint disease      Hypertension      Lumbago      Other and unspecified hyperlipidemia      Pure hypercholesterolemia      Past Surgical History:   Procedure Laterality Date     C BIOPSY SYNOVIUM KNEE JOINT  1977    Semilunar Cartilage Excision, Knee     C MONZON W/O FACETEC FORAMOT/[facility] 1/2 VRT SEG, LUMBAR  1986    Lami, Lumbar     C STOMACH SURGERY PROCEDURE UNLISTED      infant     HC KNEE SCOPE,MED/LAT MENISECTOMY  8/25/10    WORK COMP, medial      HC REPAIR INCISIONAL HERNIA,REDUCIBLE      Hernia Repair, Incisional, Bilateral     HC SACROPLASTY       REMOVAL OF SPERM DUCT(S)  1992    Vasectomy       Current Outpatient Medications:      aspirin 81 MG tablet, Take 1 tablet by mouth daily., Disp: , Rfl:      atorvastatin (LIPITOR) 40 MG tablet, TAKE 1 TABLET BY MOUTH EVERY DAY, Disp: 90 tablet, Rfl: 1     hydrochlorothiazide (HYDRODIURIL) 12.5 MG tablet, TAKE 1 TABLET BY MOUTH EVERY DAY, Disp: 30 tablet, Rfl: 1     losartan (COZAAR) 100 MG tablet, TAKE 1 TABLET BY MOUTH EVERY DAY, Disp: 30 tablet, Rfl: 0     montelukast (SINGULAIR) 10 MG tablet, Take 1 tablet (10 mg) by mouth At Bedtime, Disp: 90 tablet, Rfl: 0     omeprazole (PRILOSEC) 40 MG DR capsule, TAKE ONE CAPSULE BY MOUTH ONE TIME DAILY, Disp: 90 capsule, Rfl: 1    Current Facility-Administered Medications:      ropivacaine (NAROPIN) injection 3 mL, 3 mL, , , Enrico Tate DO, 3 mL at 10/30/19 1540     triamcinolone (KENALOG-40) injection 40 mg, 40 mg, , , Enrico Tate DO, 40 mg at 10/30/19 1540  Reviewed health  maintenance   Patient Active Problem List   Diagnosis     Mixed hyperlipidemia     Lumbar radiculopathy     HTN (hypertension)     MEDIAL MENISCUS TEAR - left     OA (Osteoarthritis) of Knee - left     HYPERLIPIDEMIA LDL GOAL <130     Allergic rhinitis     Hyperlipidemia     Hypertension     Degenerative joint disease     Hypertension goal BP (blood pressure) < 140/90     Chronic fatigue     Joint ache     GERD (gastroesophageal reflux disease)     Chronic constipation     Advanced directives, counseling/discussion     Cervical radiculitis     Cervical radiculopathy     Post-traumatic osteoarthritis of left knee       OBJECTIVE:  no apparent distress  /72   Pulse 103   Temp 98.3  F (36.8  C) (Oral)   Resp 18   Ht 1.829 m (6')   Wt 100.7 kg (222 lb)   BMI 30.11 kg/m        Exam:  Constitutional: healthy, alert and no distress  Head: Normocephalic. No masses, lesions, tenderness or abnormalities  Neck: Neck supple. No adenopathy. Thyroid symmetric, normal size,  Cardiovascular: negative, PMI normal. No lifts, heaves, or thrills. RRR. No murmurs, clicks gallops or rub  Respiratory: negative Lungs clear    Orders Only on 10/02/2019   Component Date Value Ref Range Status     Sodium 10/02/2019 138  133 - 144 mmol/L Final     Potassium 10/02/2019 4.2  3.4 - 5.3 mmol/L Final     Chloride 10/02/2019 104  94 - 109 mmol/L Final     Carbon Dioxide 10/02/2019 27  20 - 32 mmol/L Final     Anion Gap 10/02/2019 7  3 - 14 mmol/L Final     Glucose 10/02/2019 118* 70 - 99 mg/dL Final    Fasting specimen     Urea Nitrogen 10/02/2019 14  7 - 30 mg/dL Final     Creatinine 10/02/2019 0.98  0.66 - 1.25 mg/dL Final     GFR Estimate 10/02/2019 83  >60 mL/min/[1.73_m2] Final    Comment: Non  GFR Calc  Starting 12/18/2018, serum creatinine based estimated GFR (eGFR) will be   calculated using the Chronic Kidney Disease Epidemiology Collaboration   (CKD-EPI) equation.       GFR Estimate If Black 10/02/2019 >90  >60  mL/min/[1.73_m2] Final    Comment:  GFR Calc  Starting 12/18/2018, serum creatinine based estimated GFR (eGFR) will be   calculated using the Chronic Kidney Disease Epidemiology Collaboration   (CKD-EPI) equation.       Calcium 10/02/2019 9.3  8.5 - 10.1 mg/dL Final     Cholesterol 10/02/2019 187  <200 mg/dL Final     Triglycerides 10/02/2019 287* <150 mg/dL Final    Comment: Borderline high:  150-199 mg/dl  High:             200-499 mg/dl  Very high:       >499 mg/dl  Fasting specimen       HDL Cholesterol 10/02/2019 38* >39 mg/dL Final     LDL Cholesterol Calculated 10/02/2019 92  <100 mg/dL Final    Desirable:       <100 mg/dl     Non HDL Cholesterol 10/02/2019 149* <130 mg/dL Final    Comment: Above Desirable:  130-159 mg/dl  Borderline high:  160-189 mg/dl  High:             190-219 mg/dl  Very high:       >219 mg/dl             ICD-10-CM    1. Routine general medical examination at a health care facility Z00.00    2. Screening for prostate cancer Z12.5 PSA, screen    PLAN: Follow up in 1 year       Today's PHQ-2 Score:   PHQ-2 ( 1999 [employer]) 11/20/2019   Q1: Little interest or pleasure in doing things 0   Q2: Feeling down, depressed or hopeless 0   PHQ-2 Score 0   Q1: Little interest or pleasure in doing things -   Q2: Feeling down, depressed or hopeless -   PHQ-2 Score -       Abuse: Current or Past(Physical, Sexual or Emotional)- No  Do you feel safe in your environment? Yes        Social History     Tobacco Use     Smoking status: Never Smoker     Smokeless tobacco: Never Used   Substance Use Topics     Alcohol use: Yes     Comment: occasional         Alcohol Use 9/28/2017   Prescreen: >3 drinks/day or >7 drinks/week? Social alcohol use   No flowsheet data found. No     Last PSA: No results found for: PSA    Reviewed orders with patient. Reviewed health maintenance and updated orders accordingly - Yes       Reviewed and updated as needed this visit by clinical staff  Tobacco  Allergies   Meds  Fam Hx  Soc Hx        Reviewed and updated as needed this visit by Provider            Review of Systems  CONSTITUTIONAL: NEGATIVE for fever, chills, change in weight  INTEGUMENTARY/SKIN: NEGATIVE for worrisome rashes, moles or lesions  EYES: NEGATIVE for vision changes or irritation  ENT: NEGATIVE for ear, mouth and throat problems  RESP: NEGATIVE for significant cough or SOB  CV: NEGATIVE for chest pain, palpitations or peripheral edema  GI: NEGATIVE for nausea, abdominal pain, heartburn, or change in bowel habits   male: negative for dysuria, hematuria, decreased urinary stream, erectile dysfunction, urethral discharge  MUSCULOSKELETAL: NEGATIVE for significant arthralgias or myalgia  NEURO: NEGATIVE for weakness, dizziness or paresthesias  PSYCHIATRIC: NEGATIVE for changes in mood or affect    OBJECTIVE:   /72   Pulse 103   Temp 98.3  F (36.8  C) (Oral)   Resp 18   Ht 1.829 m (6')   Wt 100.7 kg (222 lb)   BMI 30.11 kg/m      Physical Exam  GENERAL: healthy, alert and no distress  EYES: Eyes grossly normal to inspection, PERRL and conjunctivae and sclerae normal  HENT: ear canals and TM's normal, nose and mouth without ulcers or lesions  NECK: no adenopathy, no asymmetry, masses, or scars and thyroid normal to palpation  RESP: lungs clear to auscultation - no rales, rhonchi or wheezes  CV: regular rate and rhythm, normal S1 S2, no S3 or S4, no murmur, click or rub, no peripheral edema and peripheral pulses strong  ABDOMEN: soft, nontender, no hepatosplenomegaly, no masses and bowel sounds normal  MS: no gross musculoskeletal defects noted, no edema  SKIN: no suspicious lesions or rashes  NEURO: Normal strength and tone, mentation intact and speech normal  PSYCH: mentation appears normal, affect normal/bright    Diagnostic Test Results:  Labs reviewed in Epic  Reviewed with patient    ASSESSMENT/PLAN:       ICD-10-CM    1. Routine general medical examination at a health care facility Z00.00     2. Screening for prostate cancer Z12.5 PSA, screen       COUNSELING:   Reviewed preventive health counseling, as reflected in patient instructions       Regular exercise       Healthy diet/nutrition    Estimated body mass index is 30.11 kg/m  as calculated from the following:    Height as of this encounter: 1.829 m (6').    Weight as of this encounter: 100.7 kg (222 lb).     Weight management plan: less calories     reports that he has never smoked. He has never used smokeless tobacco.      Counseling Resources:  ATP IV Guidelines  Pooled Cohorts Equation Calculator  FRAX Risk Assessment  ICSI Preventive Guidelines  Dietary Guidelines for Americans, 2010  USDA's MyPlate  ASA Prophylaxis  Lung CA Screening    Josh Carter MD  [facility]  Discussed elevated psa and he will see Urology

## 2019-11-20 NOTE — NURSING NOTE
Chief Complaint   Patient presents with     Medicare Visit       Initial /72   Pulse 103   Temp 98.3  F (36.8  C) (Oral)   Resp 18   Ht 1.829 m (6')   Wt 100.7 kg (222 lb)   BMI 30.11 kg/m   Estimated body mass index is 30.11 kg/m  as calculated from the following:    Height as of this encounter: 1.829 m (6').    Weight as of this encounter: 100.7 kg (222 lb).    Betty Hayes, CMA

## 2019-11-20 NOTE — PROGRESS NOTES
SUBJECTIVE:   Enrico Leos is a 64 year old male who presents for Preventive Visit.      Are you in the first 12 months of your Medicare coverage?  Yes,  Visual Acuity:  Right Eye: 20/20   Left Eye: 20/20  Both Eyes: 20/20    HPI  Do you feel safe in your environment? Yes    Have you ever done Advance Care Planning? (For example, a Health Directive, POLST, or a discussion with a medical provider or your loved ones about your wishes): No, advance care planning information given to patient to review.  Patient plans to discuss their wishes with loved ones or provider.        Fall risk  Fallen 2 or more times in the past year?: No  Any fall with injury in the past year?: No    Cognitive Screening   1) Repeat 3 items (Leader, Season, Table)    2) Clock draw: NORMAL  3) 3 item recall: Recalls 3 objects  Results: 3 items recalled: COGNITIVE IMPAIRMENT LESS LIKELY    Mini-CogTM Copyright S Sadia. Licensed by the author for use in [facility]; reprinted with permission (soob@[facility]). All rights reserved.      Do you have sleep apnea, excessive snoring or daytime drowsiness?: yes-snores sometimes     Reviewed and updated as needed this visit by clinical staff  Tobacco  Allergies  Meds  Fam Hx  Soc Hx        Reviewed and updated as needed this visit by Provider        Social History     Tobacco Use     Smoking status: Never Smoker     Smokeless tobacco: Never Used   Substance Use Topics     Alcohol use: Yes     Comment: occasional         Alcohol Use 9/28/2017   Prescreen: >3 drinks/day or >7 drinks/week? Social alcohol use   No flowsheet data found. no         {additional problems to add (Optional):193456}    Current providers sharing in care for this patient include: {Rooming staff:  Please update Care Team in Rooming Activity, refresh this note and then delete this statement}  Patient Care Team:  Josh Carter MD as PCP - General (Family Practice)  Josh Carter MD as Assigned  "PCP    The following health maintenance items are reviewed in Epic and correct as of today:  Health Maintenance   Topic Date Due     ZOSTER IMMUNIZATION (1 of 2) 09/15/2007     BMP  2020     LIPID  10/02/2020     MEDICARE ANNUAL WELLNESS VISIT  2020     DTAP/TDAP/TD IMMUNIZATION (3 - Td) 2022     ADVANCE CARE PLANNING  2022     COLONOSCOPY  06/10/2024     HEPATITIS C SCREENING  Completed     HIV SCREENING  Completed     PHQ-2  Completed     INFLUENZA VACCINE  Addressed     IPV IMMUNIZATION  Aged Out     MENINGITIS IMMUNIZATION  Aged Out     {Chronicprobdata (optional):641788}  {Decision Support (Optional):234329}    Review of Systems  {ROS COMP (Optional):819237}    OBJECTIVE:   /72   Pulse 103   Temp 98.3  F (36.8  C) (Oral)   Resp 18   Ht 1.829 m (6')   Wt 100.7 kg (222 lb)   BMI 30.11 kg/m   Estimated body mass index is 30.11 kg/m  as calculated from the following:    Height as of this encounter: 1.829 m (6').    Weight as of this encounter: 100.7 kg (222 lb).  Physical Exam  {Exam (Optional) :987990}    {Diagnostic Test Results (Optional):483276::\"Diagnostic Test Results:\",\"Labs reviewed in Epic\"}    ASSESSMENT / PLAN:   {Dia Picklist:605423}    COUNSELING:  {Medicare Counselin}    Estimated body mass index is 30.11 kg/m  as calculated from the following:    Height as of this encounter: 1.829 m (6').    Weight as of this encounter: 100.7 kg (222 lb).    {Weight Management Plan (ACO) Complete if BMI is abnormal-  Ages 18-64  BMI >24.9.  Age 65+ with BMI <23 or >30 (Optional):490055}     reports that he has never smoked. He has never used smokeless tobacco.  {Tobacco Cessation -- Complete if patient is a smoker (Optional):526218}    Appropriate preventive services were discussed with this patient, including applicable screening as appropriate for cardiovascular disease, diabetes, osteopenia/osteoporosis, and glaucoma.  As appropriate for age/gender, discussed screening " for colorectal cancer, prostate cancer, breast cancer, and cervical cancer. Checklist reviewing preventive services available has been given to the patient.    Reviewed patients plan of care and provided an AVS. The {CarePlan:293408} for Enrico meets the Care Plan requirement. This Care Plan has been established and reviewed with the {PATIENT, FAMILY MEMBER, CAREGIVER:505451}.    Counseling Resources:  ATP IV Guidelines  Pooled Cohorts Equation Calculator  Breast Cancer Risk Calculator  FRAX Risk Assessment  ICSI Preventive Guidelines  Dietary Guidelines for Americans, 2010  USDA's MyPlate  ASA Prophylaxis  Lung CA Screening    Josh Carter MD  [facility]    Identified Health Risks:

## 2019-11-22 NOTE — TELEPHONE ENCOUNTER
MEDICAL RECORDS REQUEST   [facility]  Urology Clinic  [address] [place] [address]  PHONE: [phone]  Fax: [phone]        FUTURE VISIT INFORMATION                                                   Enrico Leos, : 1955 scheduled for future visit at [facility]    APPOINTMENT INFORMATION:    Date: 20 10:30AM    Provider:  Dakota Coffman MD    Reason for Visit/Diagnosis: Elevated PSA    REFERRAL INFORMATION:    Referring provider: BRANDI ALBA    Specialty: MD    Referring providers clinic:  [facility]    Clinic contact number: [phone]     RECORDS REQUESTED FOR VISIT                                                     NOTES  STATUS/DETAILS   OFFICE NOTE from referring provider  yes   OFFICE NOTE from other specialist  no   DISCHARGE SUMMARY from hospital  no   DISCHARGE REPORT from the ER  no   OPERATIVE REPORT  no   MEDICATION LIST  no   LABS     URINALYSIS (UA)/ PSA  yes     PRE-VISIT CHECKLIST      Record collection complete Yes   Appointment appropriately scheduled           (right time/right provider) Yes   MyChart activation Yes   Questionnaire complete If no, please explain: In process      Completed by: Christina Singh

## 2019-12-16 ENCOUNTER — PRE VISIT (OUTPATIENT)
Dept: UROLOGY | Facility: CLINIC | Age: 64
End: 2019-12-16

## 2019-12-16 NOTE — TELEPHONE ENCOUNTER
Chief Complaint : elevated PSA    Records/Orders: record in epic     Pt Contacted: no    At Rooming: normal

## 2019-12-25 ENCOUNTER — TELEPHONE (OUTPATIENT)
Dept: FAMILY MEDICINE | Facility: CLINIC | Age: 64
End: 2019-12-25

## 2019-12-25 DIAGNOSIS — I10 ESSENTIAL HYPERTENSION: Primary | ICD-10-CM

## 2019-12-25 RX ORDER — METOPROLOL SUCCINATE 50 MG/1
50 TABLET, EXTENDED RELEASE ORAL DAILY
Qty: 30 TABLET | Refills: 0 | Status: SHIPPED | OUTPATIENT
Start: 2019-12-25 | End: 2019-12-26

## 2019-12-26 ENCOUNTER — ANCILLARY PROCEDURE (OUTPATIENT)
Dept: GENERAL RADIOLOGY | Facility: CLINIC | Age: 64
End: 2019-12-26
Attending: FAMILY MEDICINE
Payer: MEDICARE

## 2019-12-26 ENCOUNTER — OFFICE VISIT (OUTPATIENT)
Dept: FAMILY MEDICINE | Facility: CLINIC | Age: 64
End: 2019-12-26
Payer: MEDICARE

## 2019-12-26 VITALS
DIASTOLIC BLOOD PRESSURE: 89 MMHG | BODY MASS INDEX: 30.9 KG/M2 | SYSTOLIC BLOOD PRESSURE: 132 MMHG | WEIGHT: 227.8 LBS | TEMPERATURE: 97.3 F | HEART RATE: 109 BPM | RESPIRATION RATE: 20 BRPM

## 2019-12-26 DIAGNOSIS — R06.09 DOE (DYSPNEA ON EXERTION): ICD-10-CM

## 2019-12-26 DIAGNOSIS — I10 ESSENTIAL HYPERTENSION: ICD-10-CM

## 2019-12-26 DIAGNOSIS — R06.09 DOE (DYSPNEA ON EXERTION): Primary | ICD-10-CM

## 2019-12-26 LAB
ALBUMIN SERPL-MCNC: 3.9 G/DL (ref 3.4–5)
ALP SERPL-CCNC: 65 U/L (ref 40–150)
ALT SERPL W P-5'-P-CCNC: 50 U/L (ref 0–70)
ANION GAP SERPL CALCULATED.3IONS-SCNC: 5 MMOL/L (ref 3–14)
AST SERPL W P-5'-P-CCNC: 22 U/L (ref 0–45)
BILIRUB SERPL-MCNC: 0.9 MG/DL (ref 0.2–1.3)
BUN SERPL-MCNC: 13 MG/DL (ref 7–30)
CALCIUM SERPL-MCNC: 9.2 MG/DL (ref 8.5–10.1)
CHLORIDE SERPL-SCNC: 103 MMOL/L (ref 94–109)
CO2 SERPL-SCNC: 29 MMOL/L (ref 20–32)
CREAT SERPL-MCNC: 0.89 MG/DL (ref 0.66–1.25)
D DIMER PPP FEU-MCNC: <0.3 UG/ML FEU (ref 0–0.5)
ERYTHROCYTE [DISTWIDTH] IN BLOOD BY AUTOMATED COUNT: 15.6 % (ref 10–15)
GFR SERPL CREATININE-BSD FRML MDRD: >90 ML/MIN/{1.73_M2}
GLUCOSE SERPL-MCNC: 213 MG/DL (ref 70–99)
HCT VFR BLD AUTO: 49.5 % (ref 40–53)
HGB BLD-MCNC: 15.9 G/DL (ref 13.3–17.7)
MCH RBC QN AUTO: 26.3 PG (ref 26.5–33)
MCHC RBC AUTO-ENTMCNC: 32.1 G/DL (ref 31.5–36.5)
MCV RBC AUTO: 82 FL (ref 78–100)
PLATELET # BLD AUTO: 227 10E9/L (ref 150–450)
POTASSIUM SERPL-SCNC: 4.1 MMOL/L (ref 3.4–5.3)
PROT SERPL-MCNC: 7.5 G/DL (ref 6.8–8.8)
RBC # BLD AUTO: 6.05 10E12/L (ref 4.4–5.9)
SODIUM SERPL-SCNC: 137 MMOL/L (ref 133–144)
TSH SERPL DL<=0.005 MIU/L-ACNC: 0.86 MU/L (ref 0.4–4)
WBC # BLD AUTO: 6.2 10E9/L (ref 4–11)

## 2019-12-26 PROCEDURE — 85027 COMPLETE CBC AUTOMATED: CPT | Performed by: FAMILY MEDICINE

## 2019-12-26 PROCEDURE — 84443 ASSAY THYROID STIM HORMONE: CPT | Performed by: FAMILY MEDICINE

## 2019-12-26 PROCEDURE — 93000 ELECTROCARDIOGRAM COMPLETE: CPT | Performed by: FAMILY MEDICINE

## 2019-12-26 PROCEDURE — 80053 COMPREHEN METABOLIC PANEL: CPT | Performed by: FAMILY MEDICINE

## 2019-12-26 PROCEDURE — 36415 COLL VENOUS BLD VENIPUNCTURE: CPT | Performed by: FAMILY MEDICINE

## 2019-12-26 PROCEDURE — 85379 FIBRIN DEGRADATION QUANT: CPT | Performed by: FAMILY MEDICINE

## 2019-12-26 PROCEDURE — 71046 X-RAY EXAM CHEST 2 VIEWS: CPT

## 2019-12-26 PROCEDURE — 99214 OFFICE O/P EST MOD 30 MIN: CPT | Performed by: FAMILY MEDICINE

## 2019-12-26 RX ORDER — METOPROLOL SUCCINATE 50 MG/1
50 TABLET, EXTENDED RELEASE ORAL DAILY
Qty: 30 TABLET | Refills: 0 | Status: SHIPPED | OUTPATIENT
Start: 2019-12-26 | End: 2020-01-21

## 2019-12-26 NOTE — NURSING NOTE
Chief Complaint   Patient presents with     Hypertension       Initial /89   Pulse 109   Temp 97.3  F (36.3  C) (Oral)   Resp 20   Wt 103.3 kg (227 lb 12.8 oz)   BMI 30.90 kg/m   Estimated body mass index is 30.9 kg/m  as calculated from the following:    Height as of 11/20/19: 1.829 m (6').    Weight as of this encounter: 103.3 kg (227 lb 12.8 oz).  Medication Reconciliation: complete  Hector Ferrell CMA

## 2019-12-26 NOTE — PROGRESS NOTES
SUBJECTIVE:  64 year oldyear old male enters with  hypertension.  Pt. Has been compliant with medications and medications were reviewed. He claims he has dyspnea on exertion and has headache for the last few weeks and is getting worse.  No leg pain.    Current Outpatient Medications:      aspirin 81 MG tablet, Take 1 tablet by mouth daily., Disp: , Rfl:      atorvastatin (LIPITOR) 40 MG tablet, Take 1 tablet (40 mg) by mouth daily, Disp: 90 tablet, Rfl: 3     hydrochlorothiazide (HYDRODIURIL) 12.5 MG tablet, Take 1 tablet (12.5 mg) by mouth daily, Disp: 90 tablet, Rfl: 3     losartan (COZAAR) 100 MG tablet, Take 1 tablet (100 mg) by mouth daily, Disp: 90 tablet, Rfl: 3     metoprolol succinate ER (TOPROL-XL) 50 MG 24 hr tablet, Take 1 tablet (50 mg) by mouth daily, Disp: 30 tablet, Rfl: 0     montelukast (SINGULAIR) 10 MG tablet, Take 1 tablet (10 mg) by mouth At Bedtime, Disp: 90 tablet, Rfl: 0     omeprazole (PRILOSEC) 40 MG DR capsule, Take 1 capsule (40 mg) by mouth daily, Disp: 90 capsule, Rfl: 3  Past Medical History:   Diagnosis Date     Allergic rhinitis, cause unspecified     Allergic rhinitis     Chronic fatigue 9/9/2011     Degenerative joint disease      Hypertension      Lumbago      Other and unspecified hyperlipidemia      Pure hypercholesterolemia      Office Visit on 11/20/2019   Component Date Value Ref Range Status     PSA 11/20/2019 5.94* 0 - 4 ug/L Final    Assay Method:  Chemiluminescence using Siemens Vista analyzer      Reviewed health maintenance  Patient Active Problem List   Diagnosis     Mixed hyperlipidemia     Lumbar radiculopathy     HTN (hypertension)     MEDIAL MENISCUS TEAR - left     OA (Osteoarthritis) of Knee - left     HYPERLIPIDEMIA LDL GOAL <130     Allergic rhinitis     Hyperlipidemia     Hypertension     Degenerative joint disease     Hypertension goal BP (blood pressure) < 140/90     Chronic fatigue     Joint ache     GERD (gastroesophageal reflux disease)     Chronic  constipation     Advanced directives, counseling/discussion     Cervical radiculitis     Cervical radiculopathy     Post-traumatic osteoarthritis of left knee         OBJECTIVE:  no apparent distress  /89   Pulse 109   Temp 97.3  F (36.3  C) (Oral)   Resp 20   Wt 103.3 kg (227 lb 12.8 oz)   BMI 30.90 kg/m       Head: Normocephalic. No masses, lesions, tenderness or abnormalities.  Neck::Neck supple. No adenopathy. Thyroid symmetric, normal size.    Cardiovascular: negative. No lifts, heaves, or thrills. RRR. No murmurs, clicks gallops or rubs  Respiratory. Good diaphragmatic excursion. Lungs clear  Gastrointestinal:Abdomen soft, non-tender.  No masses, organomegaly  chest x-ray no change  D dimer negative   Lab Results   Component Value Date    WBC 6.2 12/26/2019     Lab Results   Component Value Date    RBC 6.05 12/26/2019     Lab Results   Component Value Date    HGB 15.9 12/26/2019     Lab Results   Component Value Date    HCT 49.5 12/26/2019     No components found for: MCT  Lab Results   Component Value Date    MCV 82 12/26/2019     Lab Results   Component Value Date    MCH 26.3 12/26/2019     Lab Results   Component Value Date    MCHC 32.1 12/26/2019     Lab Results   Component Value Date    RDW 15.6 12/26/2019     Lab Results   Component Value Date     12/26/2019        EKG no change  Office Visit on 11/20/2019   Component Date Value Ref Range Status     PSA 11/20/2019 5.94* 0 - 4 ug/L Final    Assay Method:  Chemiluminescence using Siemens Vista analyzer         ICD-10-CM    1. MATA (dyspnea on exertion) R06.09 EKG 12-lead complete w/read - Clinics     XR Chest 2 Views     D dimer, quantitative     CBC with platelets     Comprehensive metabolic panel (BMP + Alb, Alk Phos, ALT, AST, Total. Bili, TP)   2. Essential hypertension I10 D dimer, quantitative     CBC with platelets     Comprehensive metabolic panel (BMP + Alb, Alk Phos, ALT, AST, Total. Bili, TP)     TSH with free T4 reflex      metoprolol succinate ER (TOPROL-XL) 50 MG 24 hr tablet    PLAN: call with blood pressure if continues to be elevated

## 2020-01-01 ASSESSMENT — ENCOUNTER SYMPTOMS
POLYDIPSIA: 0
EYE REDNESS: 0
BACK PAIN: 1
EYE PAIN: 0
STIFFNESS: 1
DECREASED APPETITE: 0
WEIGHT LOSS: 0
PALPITATIONS: 0
SORE THROAT: 0
FATIGUE: 1
SINUS CONGESTION: 0
TASTE DISTURBANCE: 0
MUSCLE CRAMPS: 1
JOINT SWELLING: 1
INCREASED ENERGY: 1
EYE WATERING: 1
HYPOTENSION: 0
NECK MASS: 0
MUSCLE WEAKNESS: 1
HOARSE VOICE: 0
SMELL DISTURBANCE: 0
WEIGHT GAIN: 0
FEVER: 0
HYPERTENSION: 1
SLEEP DISTURBANCES DUE TO BREATHING: 0
NIGHT SWEATS: 0
NECK PAIN: 1
DOUBLE VISION: 0
HALLUCINATIONS: 0
EXERCISE INTOLERANCE: 0
POLYPHAGIA: 0
SYNCOPE: 0
MYALGIAS: 1
ALTERED TEMPERATURE REGULATION: 0
ORTHOPNEA: 0
LEG PAIN: 1
CHILLS: 0
TROUBLE SWALLOWING: 0
LIGHT-HEADEDNESS: 0
EYE IRRITATION: 1
SINUS PAIN: 0
ARTHRALGIAS: 1

## 2020-01-02 ENCOUNTER — OFFICE VISIT (OUTPATIENT)
Dept: UROLOGY | Facility: CLINIC | Age: 65
End: 2020-01-02
Attending: FAMILY MEDICINE
Payer: MEDICARE

## 2020-01-02 ENCOUNTER — PRE VISIT (OUTPATIENT)
Dept: UROLOGY | Facility: CLINIC | Age: 65
End: 2020-01-02

## 2020-01-02 VITALS
HEIGHT: 72 IN | HEART RATE: 102 BPM | WEIGHT: 223 LBS | DIASTOLIC BLOOD PRESSURE: 72 MMHG | BODY MASS INDEX: 30.2 KG/M2 | SYSTOLIC BLOOD PRESSURE: 108 MMHG

## 2020-01-02 DIAGNOSIS — R97.20 ELEVATED PROSTATE SPECIFIC ANTIGEN (PSA): ICD-10-CM

## 2020-01-02 DIAGNOSIS — Z12.5 SCREENING FOR PROSTATE CANCER: Primary | ICD-10-CM

## 2020-01-02 DIAGNOSIS — Z12.5 SCREENING FOR PROSTATE CANCER: ICD-10-CM

## 2020-01-02 ASSESSMENT — MIFFLIN-ST. JEOR: SCORE: 1849.52

## 2020-01-02 ASSESSMENT — PAIN SCALES - GENERAL: PAINLEVEL: NO PAIN (0)

## 2020-01-02 NOTE — LETTER
2020       RE: Enrico Leos  [address]  [place] [address]     Dear Colleague,    Thank you for referring your patient, Enrico Leos, to the [facility] PR[facility] UROLOGIC CANCERS at [facility]. Please see a copy of my visit note below.      Urology Clinic    Dakota Coffman MD  Date of Service: 2020     Name: Enrico Leos  MRN: [id number]  Age: 64 year old  : 1955  Referring provider: Josh Carter     Assessment:  Enrico Leos  is a 64 year old male who presents for evaluation of elevated PSA of 5.94 on 2019. Notably, his father also had prostate cancer which was diagnosed at 74 and required surgery.     We had an exceedingly long discussion about the utility of PSA screening.  We talked about the Pros and the Cons.  We discussed the findings of the PLCO and EORTC studies.  We discussed the results of the [employer] Scandinavian trial of treatment vs. Observation in clinically detected prostate cancer as well as the results of the PIVOT trial in the context of screen-detected cancer.  We discussed the recommendations by the AUA, ASC and USPTSF.  We also discussed some of the findings of the [facility] prostate observation study.    We also discussed the significant (2-6%) and rising risk of biopsy associated sepsis.    We plugged his numbers into the PCPT individualized risk calculator and found   the risk of a negative biopsy is 66%  The risk of low risk cancer is 22%  The risk of intermediate to high risk is 12%    In the end given the overall elevated PSA and abnormal JAYNE. Though, we felt that there was evidence that a biopsy wouldn't be beneficial at this point. Instead, he will follow-up with prostate MRI for further evaluation.     Plan:  -PSA   -Prostate MRI   -Very probably MRI guided biopsy.  Only if MRI shows PIRADS 1=2 or if PSA density is well below 0.15 will we avoid  biopsy    Attestation:  This patient was seen and evaluated by me, with a scribe taking notes.  I have reviewed the note above and agree.  The physical exam and or any procedures were performed by me and the pertinant details are outlined below.       Dakota Coffman MD  Department of Urology  [facility]    ---------------------------------------------------------------------------------------------------------------------    Chief Complaint:   Chief Complaint   Patient presents with     New Patient     Elevated PSA       HPI:   Enrico Leos  is a 64 year old male with a history of HTN, HLD, chronic fatigue and DJD who presents for evaluation of elevated PSA of 5.94 on 11/20/2019.     Notably, her father had a history of prostate cancer and underwent surgery at 74. His father had leaking after surgery but Enrico also reports that his father has parkinson's disease. He also had a maternal cousin with prostate cancer which required surgery. No personal history of biopsies.     Denies issues with urination, flank pain or lumps/bumps. He has neck pain, low back pain and left arm pain which he believes started after work injury.     He is retired. 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     Review of Systems:   Pertinent items are noted in HPI or as below, remainder of complete ROS is negative.      Active Medications:      aspirin 81 MG tablet, Take 1 tablet by mouth daily., Disp: , Rfl:      atorvastatin (LIPITOR) 40 MG tablet, Take 1 tablet (40 mg) by mouth daily, Disp: 90 tablet, Rfl: 3     hydrochlorothiazide (HYDRODIURIL) 12.5 MG tablet, Take 1 tablet (12.5 mg) by mouth daily, Disp: 90 tablet, Rfl: 3     losartan (COZAAR) 100 MG tablet, Take 1 tablet (100 mg) by mouth daily, Disp: 90 tablet, Rfl: 3     metoprolol succinate ER (TOPROL-XL) 50 MG 24 hr tablet, Take 1 tablet (50 mg) by mouth daily, Disp: 30 tablet, Rfl: 0     montelukast (SINGULAIR) 10 MG tablet, Take 1 tablet (10  mg) by mouth At Bedtime, Disp: 90 tablet, Rfl: 0     omeprazole (PRILOSEC) 40 MG DR capsule, Take 1 capsule (40 mg) by mouth daily, Disp: 90 capsule, Rfl: 3      Allergies:   No known drug allergies      Past Medical History:  Chronic fatigue   DJD   Hypertension   Hyperlipidemia   Osteoarthritis of left knee   GERD   Chronic constipation   Cervical radiculopathy      Past Surgical History:  Synovium knee joint biopsy-1977   Laminectomy and diskectomy-1986   Stomach surgery as an infant   Menisectomy-08/2010   Incisional bilateral hernia repair   Sacroplasty    Vasectomy-1992     Family History:   Mother: cancer (tumor on leg), HDL  Father: parkinsonism   Maternal grandmother: lung cancer   Paternal grandmother: cerebrovascular disease      Social History:   The patient was alone   Smoking Status: never   Smokeless Tobacco: never    Alcohol Use: yes; occasional       Physical Exam:   Patient is a 64 year old  male   Vitals: Blood pressure 108/72, pulse 102, height 1.829 m (6'), weight 101.2 kg (223 lb).  General Appearance Adult: Alert, no acute distress, oriented  HENT: throat/mouth:normal, good dentition  Lungs: no respiratory distress, or pursed lip breathing  Heart: No obvious jugular venous distension present  Abdomen: Body mass index is 30.24 kg/m .  Musculoskeltal: extremities normal  Skin: no visible suspicious lesions or rashes  Neuro: Alert, oriented, speech and mentation normal  Psych: affect and mood normal  Gait: Normal  : JAYNE with nodule on left apex.  penis, scrotum, testes normal.     Laboratory:   I personally reviewed all applicable laboratory data and went over findings with patient  Significant for:    CBC RESULTS:  Recent Labs   Lab Test 12/26/19  0832 12/31/15  0948 03/26/12  1009   WBC 6.2 6.4 5.8   HGB 15.9 15.3 15.9    234 216     BMP RESULTS:  Recent Labs   Lab Test 12/26/19  0832 10/02/19  1027 05/17/18  0946 09/21/17  1020    138 140 138   POTASSIUM 4.1 4.2 4.2 4.1    CHLORIDE 103 104 106 104   CO2 29 27 29 26   ANIONGAP 5 7 5 8   * 118* 126* 108*   BUN 13 14 13 17   CR 0.89 0.98 1.07 1.03   GFRESTIMATED >90 83 70 74   GFRESTBLACK >90 >90 85 89   BETTY 9.2 9.3 9.0 8.6     UA RESULTS:   Recent Labs   Lab Test 08/30/13  0942 03/26/12  1017   SG 1.015 1.020   URINEPH 6.5 6.0   NITRITE Negative Negative   RBCU 2-5* 2-5*   WBCU O - 2 O - 2     PSA RESULTS:   PSA   Date Value Ref Range Status   11/20/2019 5.94 (H) 0 - 4 ug/L Final     Comment:     Assay Method:  Chemiluminescence using Siemens Vista analyzer     Scribe Disclosure:  INita, am serving as a scribe to document services personally performed by Dakota Coffman MD at this visit, based upon the provider's statements to me. All documentation has been reviewed by the aforementioned provider prior to being entered into the official medical record.     Answers for HPI/ROS submitted by the patient on 1/1/2020   General Symptoms: Yes  Skin Symptoms: No  HENT Symptoms: Yes  EYE SYMPTOMS: Yes  HEART SYMPTOMS: Yes  LUNG SYMPTOMS: No  INTESTINAL SYMPTOMS: No  URINARY SYMPTOMS: No  REPRODUCTIVE SYMPTOMS: No  SKELETAL SYMPTOMS: Yes  BLOOD SYMPTOMS: No  NERVOUS SYSTEM SYMPTOMS: No  MENTAL HEALTH SYMPTOMS: No  Fever: No  Loss of appetite: No  Weight loss: No  Weight gain: No  Fatigue: Yes  Night sweats: No  Chills: No  Increased stress: Yes  Excessive hunger: No  Excessive thirst: No  Feeling hot or cold when others believe the temperature is normal: No  Loss of height: Yes  Post-operative complications: Yes  Surgical site pain: Yes  Hallucinations: No  Change in or Loss of Energy: Yes  Hyperactivity: No  Confusion: No  Ear pain: No  Ear discharge: No  Hearing loss: Yes  Tinnitus: Yes  Nosebleeds: Yes  Congestion: No  Sinus pain: No  Trouble swallowing: No   Voice hoarseness: No  Mouth sores: No  Sore throat: No  Tooth pain: Yes  Gum tenderness: No  Bleeding gums: No  Change in taste: No  Change in sense of  smell: No  Dry mouth: Yes  Hearing aid used: Yes  Neck lump: No  Eye pain: No  Vision loss: No  Dry eyes: Yes  Watery eyes: Yes  Eye bulging: No  Double vision: No  Flashing of lights: No  Spots: Yes  Floaters: No  Redness: No  Crossed eyes: No  Tunnel Vision: No  Yellowing of eyes: No  Eye irritation: Yes  Chest pain or pressure: No  Fast or irregular heartbeat: No  Pain in legs with walking: Yes  Trouble breathing while lying down: No  Fingers or toes appear blue: No  High blood pressure: Yes  Low blood pressure: No  Fainting: No  Murmurs: No  Pacemaker: No  Varicose veins: No  Edema or swelling: No  Wake up at night with shortness of breath: No  Light-headedness: No  Exercise intolerance: No  Back pain: Yes  Muscle aches: Yes  Neck pain: Yes  Swollen joints: Yes  Joint pain: Yes  Bone pain: No  Muscle cramps: Yes  Muscle weakness: Yes  Joint stiffness: Yes  Bone fracture: No      Again, thank you for allowing me to participate in the care of your patient.      Sincerely,    Dakota Coffman MD

## 2020-01-02 NOTE — PROGRESS NOTES
Urology Clinic    Dakota Coffman MD  Date of Service: 2020     Name: Enrico Leos  MRN: [id number]  Age: 64 year old  : 1955  Referring provider: Josh Carter     Assessment:  Enrico Leos  is a 64 year old male who presents for evaluation of elevated PSA of 5.94 on 2019. Notably, his father also had prostate cancer which was diagnosed at 74 and required surgery.     We had an exceedingly long discussion about the utility of PSA screening.  We talked about the Pros and the Cons.  We discussed the findings of the PLCO and EORTC studies.  We discussed the results of the [place]el[place] Scandinavian trial of treatment vs. Observation in clinically detected prostate cancer as well as the results of the PIVOT trial in the context of screen-detected cancer.  We discussed the recommendations by the AUA, ASC and USPTSF.  We also discussed some of the findings of the [facility] prostate observation study.    We also discussed the significant (2-6%) and rising risk of biopsy associated sepsis.    We plugged his numbers into the PCPT individualized risk calculator and found   the risk of a negative biopsy is 66%  The risk of low risk cancer is 22%  The risk of intermediate to high risk is 12%    In the end given the overall elevated PSA and abnormal JAYNE. Though, we felt that there was evidence that a biopsy wouldn't be beneficial at this point. Instead, he will follow-up with prostate MRI for further evaluation.     Plan:  -PSA   -Prostate MRI   -Very probably MRI guided biopsy.  Only if MRI shows PIRADS 1=2 or if PSA density is well below 0.15 will we avoid biopsy    Attestation:  This patient was seen and evaluated by me, with a scribe taking notes.  I have reviewed the note above and agree.  The physical exam and or any procedures were performed by me and the pertinant details are outlined below.       Dakota Coffman MD  Department of Urology  [facility]  [place]    ---------------------------------------------------------------------------------------------------------------------    Chief Complaint:   Chief Complaint   Patient presents with     New Patient     Elevated PSA       HPI:   Enrico Leos  is a 64 year old male with a history of HTN, HLD, chronic fatigue and DJD who presents for evaluation of elevated PSA of 5.94 on 11/20/2019.     Notably, her father had a history of prostate cancer and underwent surgery at 74. His father had leaking after surgery but Enrico also reports that his father has parkinson's disease. He also had a maternal cousin with prostate cancer which required surgery. No personal history of biopsies.     Denies issues with urination, flank pain or lumps/bumps. He has neck pain, low back pain and left arm pain which he believes started after work injury.     He is retired. 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     Review of Systems:   Pertinent items are noted in HPI or as below, remainder of complete ROS is negative.      Active Medications:      aspirin 81 MG tablet, Take 1 tablet by mouth daily., Disp: , Rfl:      atorvastatin (LIPITOR) 40 MG tablet, Take 1 tablet (40 mg) by mouth daily, Disp: 90 tablet, Rfl: 3     hydrochlorothiazide (HYDRODIURIL) 12.5 MG tablet, Take 1 tablet (12.5 mg) by mouth daily, Disp: 90 tablet, Rfl: 3     losartan (COZAAR) 100 MG tablet, Take 1 tablet (100 mg) by mouth daily, Disp: 90 tablet, Rfl: 3     metoprolol succinate ER (TOPROL-XL) 50 MG 24 hr tablet, Take 1 tablet (50 mg) by mouth daily, Disp: 30 tablet, Rfl: 0     montelukast (SINGULAIR) 10 MG tablet, Take 1 tablet (10 mg) by mouth At Bedtime, Disp: 90 tablet, Rfl: 0     omeprazole (PRILOSEC) 40 MG DR capsule, Take 1 capsule (40 mg) by mouth daily, Disp: 90 capsule, Rfl: 3      Allergies:   No known drug allergies      Past Medical History:  Chronic fatigue   DJD   Hypertension   Hyperlipidemia   Osteoarthritis of left knee    GERD   Chronic constipation   Cervical radiculopathy      Past Surgical History:  Synovium knee joint biopsy-1977   Laminectomy and diskectomy-1986   Stomach surgery as an infant   Menisectomy-08/2010   Incisional bilateral hernia repair   Sacroplasty    Vasectomy-1992     Family History:   Mother: cancer (tumor on leg), HDL  Father: parkinsonism   Maternal grandmother: lung cancer   Paternal grandmother: cerebrovascular disease      Social History:   The patient was alone   Smoking Status: never   Smokeless Tobacco: never    Alcohol Use: yes; occasional       Physical Exam:   Patient is a 64 year old  male   Vitals: Blood pressure 108/72, pulse 102, height 1.829 m (6'), weight 101.2 kg (223 lb).  General Appearance Adult: Alert, no acute distress, oriented  HENT: throat/mouth:normal, good dentition  Lungs: no respiratory distress, or pursed lip breathing  Heart: No obvious jugular venous distension present  Abdomen: Body mass index is 30.24 kg/m .  Musculoskeltal: extremities normal  Skin: no visible suspicious lesions or rashes  Neuro: Alert, oriented, speech and mentation normal  Psych: affect and mood normal  Gait: Normal  : JAYNE with nodule on left apex.  penis, scrotum, testes normal.     Laboratory:   I personally reviewed all applicable laboratory data and went over findings with patient  Significant for:    CBC RESULTS:  Recent Labs   Lab Test 12/26/19  0832 12/31/15  0948 03/26/12  1009   WBC 6.2 6.4 5.8   HGB 15.9 15.3 15.9    234 216     BMP RESULTS:  Recent Labs   Lab Test 12/26/19  0832 10/02/19  1027 05/17/18  0946 09/21/17  1020    138 140 138   POTASSIUM 4.1 4.2 4.2 4.1   CHLORIDE 103 104 106 104   CO2 29 27 29 26   ANIONGAP 5 7 5 8   * 118* 126* 108*   BUN 13 14 13 17   CR 0.89 0.98 1.07 1.03   GFRESTIMATED >90 83 70 74   GFRESTBLACK >90 >90 85 89   BETTY 9.2 9.3 9.0 8.6     UA RESULTS:   Recent Labs   Lab Test 08/30/13  0942 03/26/12  1017   SG 1.015 1.020   URINEPH 6.5 6.0    NITRITE Negative Negative   RBCU 2-5* 2-5*   WBCU O - 2 O - 2     PSA RESULTS:   PSA   Date Value Ref Range Status   11/20/2019 5.94 (H) 0 - 4 ug/L Final     Comment:     Assay Method:  Chemiluminescence using Siemens Vista analyzer     Scribe Disclosure:  DORANita, am serving as a scribe to document services personally performed by Dakota Coffman MD at this visit, based upon the provider's statements to me. All documentation has been reviewed by the aforementioned provider prior to being entered into the official medical record.     Answers for HPI/ROS submitted by the patient on 1/1/2020   General Symptoms: Yes  Skin Symptoms: No  HENT Symptoms: Yes  EYE SYMPTOMS: Yes  HEART SYMPTOMS: Yes  LUNG SYMPTOMS: No  INTESTINAL SYMPTOMS: No  URINARY SYMPTOMS: No  REPRODUCTIVE SYMPTOMS: No  SKELETAL SYMPTOMS: Yes  BLOOD SYMPTOMS: No  NERVOUS SYSTEM SYMPTOMS: No  MENTAL HEALTH SYMPTOMS: No  Fever: No  Loss of appetite: No  Weight loss: No  Weight gain: No  Fatigue: Yes  Night sweats: No  Chills: No  Increased stress: Yes  Excessive hunger: No  Excessive thirst: No  Feeling hot or cold when others believe the temperature is normal: No  Loss of height: Yes  Post-operative complications: Yes  Surgical site pain: Yes  Hallucinations: No  Change in or Loss of Energy: Yes  Hyperactivity: No  Confusion: No  Ear pain: No  Ear discharge: No  Hearing loss: Yes  Tinnitus: Yes  Nosebleeds: Yes  Congestion: No  Sinus pain: No  Trouble swallowing: No   Voice hoarseness: No  Mouth sores: No  Sore throat: No  Tooth pain: Yes  Gum tenderness: No  Bleeding gums: No  Change in taste: No  Change in sense of smell: No  Dry mouth: Yes  Hearing aid used: Yes  Neck lump: No  Eye pain: No  Vision loss: No  Dry eyes: Yes  Watery eyes: Yes  Eye bulging: No  Double vision: No  Flashing of lights: No  Spots: Yes  Floaters: No  Redness: No  Crossed eyes: No  Tunnel Vision: No  Yellowing of eyes: No  Eye irritation: Yes  Chest  pain or pressure: No  Fast or irregular heartbeat: No  Pain in legs with walking: Yes  Trouble breathing while lying down: No  Fingers or toes appear blue: No  High blood pressure: Yes  Low blood pressure: No  Fainting: No  Murmurs: No  Pacemaker: No  Varicose veins: No  Edema or swelling: No  Wake up at night with shortness of breath: No  Light-headedness: No  Exercise intolerance: No  Back pain: Yes  Muscle aches: Yes  Neck pain: Yes  Swollen joints: Yes  Joint pain: Yes  Bone pain: No  Muscle cramps: Yes  Muscle weakness: Yes  Joint stiffness: Yes  Bone fracture: No

## 2020-01-02 NOTE — PATIENT INSTRUCTIONS
Please schedule the next available biopsy appointment with Dr. Coffman. Please schedule labs and imaging at least 1 week prior to the appointment.      It was a pleasure meeting with you today.  Thank you for allowing me and my team the privilege of caring for you today.  YOU are the reason we are here, and I truly hope we provided you with the excellent service you deserve.  Please let us know if there is anything else we can do for you so that we can be sure you are leaving completely satisfied with your care experience.

## 2020-01-05 LAB
PSA FREE MFR SERPL: 13 %
PSA FREE SERPL-MCNC: 0.6 NG/ML
PSA SERPL-MCNC: 4.6 NG/ML (ref 0–4)

## 2020-01-14 ENCOUNTER — PRE VISIT (OUTPATIENT)
Dept: UROLOGY | Facility: CLINIC | Age: 65
End: 2020-01-14

## 2020-01-14 NOTE — TELEPHONE ENCOUNTER
Chief Complaint : Biopsy f/u    Records/Orders: pathology    Pt Contacted: no    At Rooming: print path

## 2020-01-15 DIAGNOSIS — R97.20 ELEVATED PROSTATE SPECIFIC ANTIGEN (PSA): Primary | ICD-10-CM

## 2020-03-09 DIAGNOSIS — I10 ESSENTIAL HYPERTENSION: ICD-10-CM

## 2020-03-09 DIAGNOSIS — I10 ESSENTIAL HYPERTENSION WITH GOAL BLOOD PRESSURE LESS THAN 140/90: ICD-10-CM

## 2020-03-09 RX ORDER — METOPROLOL SUCCINATE 50 MG/1
50 TABLET, EXTENDED RELEASE ORAL DAILY
Qty: 90 TABLET | Refills: 1 | Status: SHIPPED | OUTPATIENT
Start: 2020-03-09 | End: 2020-09-21

## 2020-03-09 RX ORDER — LOSARTAN POTASSIUM 100 MG/1
100 TABLET ORAL DAILY
Qty: 90 TABLET | Refills: 1 | Status: SHIPPED | OUTPATIENT
Start: 2020-03-09 | End: 2021-01-20

## 2020-04-30 ENCOUNTER — TELEPHONE (OUTPATIENT)
Dept: ORTHOPEDICS | Facility: CLINIC | Age: 65
End: 2020-04-30

## 2020-04-30 DIAGNOSIS — M17.9 OSTEOARTHRITIS OF KNEE, UNSPECIFIED LATERALITY, UNSPECIFIED OSTEOARTHRITIS TYPE: Primary | ICD-10-CM

## 2020-04-30 NOTE — TELEPHONE ENCOUNTER
Reason for call:  Other   Patient called regarding (reason for call): appointment  Additional comments:  Patient calling. He goes elsewhere for knee injections. They are not open. Can you do the injections for him? Please call and advise.     Phone number to reach patient:  Home number on file [phone] (home)    Best Time:   Any     Can we leave a detailed message on this number?  YES    Travel screening: Not Applicable

## 2020-05-01 NOTE — TELEPHONE ENCOUNTER
Called and helped schedule an appointment with Dr. Dimas on 5/4/20. He thanked me for calling.  Petra Serrano Certified Medical Assistant

## 2020-05-04 ENCOUNTER — TELEPHONE (OUTPATIENT)
Dept: ORTHOPEDICS | Facility: CLINIC | Age: 65
End: 2020-05-04

## 2020-05-04 NOTE — TELEPHONE ENCOUNTER
Reason for call:  Prior auth for steroid injection  Patient called regarding (reason for call): Eleonora is calling needing prior authorization  Additional comments: Please fax over authorization and call if any questions    Phone number to reach patient:  na    Best Time:  9:00 am to 6:00 pm    Can we leave a detailed message on this number?  NO    Travel screening: Not Applicable

## 2020-05-04 NOTE — TELEPHONE ENCOUNTER
Called and left message at [facility] for Eleonora, letting her know that we will do the injections and go ahead and pay for his injection. We do not need a prior authorization.    Natalie Urrutia MA

## 2020-05-11 ENCOUNTER — OFFICE VISIT (OUTPATIENT)
Dept: ORTHOPEDICS | Facility: CLINIC | Age: 65
End: 2020-05-11
Payer: OTHER MISCELLANEOUS

## 2020-05-11 VITALS — BODY MASS INDEX: 30.2 KG/M2 | WEIGHT: 223 LBS | RESPIRATION RATE: 16 BRPM | HEIGHT: 72 IN

## 2020-05-11 DIAGNOSIS — M17.0 PRIMARY OSTEOARTHRITIS OF BOTH KNEES: Primary | ICD-10-CM

## 2020-05-11 PROCEDURE — 20610 DRAIN/INJ JOINT/BURSA W/O US: CPT | Mod: 50 | Performed by: ORTHOPAEDIC SURGERY

## 2020-05-11 RX ORDER — METHYLPREDNISOLONE ACETATE 80 MG/ML
80 INJECTION, SUSPENSION INTRA-ARTICULAR; INTRALESIONAL; INTRAMUSCULAR; SOFT TISSUE
Status: DISCONTINUED | OUTPATIENT
Start: 2020-05-11 | End: 2021-01-20

## 2020-05-11 RX ORDER — LIDOCAINE HYDROCHLORIDE 10 MG/ML
5 INJECTION, SOLUTION EPIDURAL; INFILTRATION; INTRACAUDAL; PERINEURAL
Status: DISCONTINUED | OUTPATIENT
Start: 2020-05-11 | End: 2021-01-20

## 2020-05-11 RX ORDER — METHYLPREDNISOLONE ACETATE 80 MG/ML
160 INJECTION, SUSPENSION INTRA-ARTICULAR; INTRALESIONAL; INTRAMUSCULAR; SOFT TISSUE
Status: DISCONTINUED | OUTPATIENT
Start: 2020-05-11 | End: 2021-01-20

## 2020-05-11 RX ADMIN — LIDOCAINE HYDROCHLORIDE 5 ML: 10 INJECTION, SOLUTION EPIDURAL; INFILTRATION; INTRACAUDAL; PERINEURAL at 09:33

## 2020-05-11 RX ADMIN — METHYLPREDNISOLONE ACETATE 160 MG: 80 INJECTION, SUSPENSION INTRA-ARTICULAR; INTRALESIONAL; INTRAMUSCULAR; SOFT TISSUE at 09:33

## 2020-05-11 RX ADMIN — METHYLPREDNISOLONE ACETATE 80 MG: 80 INJECTION, SUSPENSION INTRA-ARTICULAR; INTRALESIONAL; INTRAMUSCULAR; SOFT TISSUE at 09:33

## 2020-05-11 ASSESSMENT — MIFFLIN-ST. JEOR: SCORE: 1849.52

## 2020-05-11 NOTE — PROGRESS NOTES
Large Joint Injection/Arthocentesis: bilateral knee    Date/Time: 5/11/2020 9:33 AM  Performed by: Josh Dimas MD  Authorized by: Josh Dimas MD     Indications:  Pain and osteoarthritis  Needle Size:  22 G  Guidance: landmark guided    Approach:  Anteromedial  Location:  Knee  Laterality:  Bilateral      Medications (Right):  80 mg methylPREDNISolone 80 MG/ML; 5 mL lidocaine (PF) 1 %  Medications (Left):  160 mg methylPREDNISolone 80 MG/ML; 5 mL lidocaine (PF) 1 %  Outcome:  Tolerated well, no immediate complications  Procedure discussed: discussed risks, benefits, and alternatives    Consent Given by:  Patient  Timeout: timeout called immediately prior to procedure    Prep: patient was prepped and draped in usual sterile fashion

## 2020-05-11 NOTE — PROGRESS NOTES
Follow up bilateral knee primary osteoarthritis.  Prior Synvisc caused joint irritation.  Does better with steroid.  Likes double dose for left knee.  Range of motion 0-125 degrees .    He  desires injection today of bilateral knee(s).  Risks, benefits, potential complications and alternatives were discussed.   With the patient's consent, sterile prep was performed of bilateral knee(s).  Each knee was injected with Depo Medrol 80 mg and lidocaine on right and with Depo Medrol 160 mg and lidocaine on left at anteromedial sites.  Return to clinic as needed.

## 2020-05-11 NOTE — LETTER
5/11/2020         RE: Enrico Leos  [address] [place] [address]        Dear Colleague,    Thank you for referring your patient, Enrico Leos, to the [facility]. Please see a copy of my visit note below.    Large Joint Injection/Arthocentesis: bilateral knee    Date/Time: 5/11/2020 9:33 AM  Performed by: Josh Dimas MD  Authorized by: Josh Dimas MD     Indications:  Pain and osteoarthritis  Needle Size:  22 G  Guidance: landmark guided    Approach:  Anteromedial  Location:  Knee  Laterality:  Bilateral      Medications (Right):  80 mg methylPREDNISolone 80 MG/ML; 5 mL lidocaine (PF) 1 %  Medications (Left):  160 mg methylPREDNISolone 80 MG/ML; 5 mL lidocaine (PF) 1 %  Outcome:  Tolerated well, no immediate complications  Procedure discussed: discussed risks, benefits, and alternatives    Consent Given by:  Patient  Timeout: timeout called immediately prior to procedure    Prep: patient was prepped and draped in usual sterile fashion            Follow up bilateral knee primary osteoarthritis.  Prior Synvisc caused joint irritation.  Does better with steroid.  Likes double dose for left knee.  Range of motion 0-125 degrees .    He  desires injection today of bilateral knee(s).  Risks, benefits, potential complications and alternatives were discussed.   With the patient's consent, sterile prep was performed of bilateral knee(s).  Each knee was injected with Depo Medrol 80 mg and lidocaine on right and with Depo Medrol 160 mg and lidocaine on left at anteromedial sites.  Return to clinic as needed.         Again, thank you for allowing me to participate in the care of your patient.        Sincerely,        Josh Dimas MD

## 2020-07-15 DIAGNOSIS — M54.12 CERVICAL RADICULOPATHY: Primary | ICD-10-CM

## 2020-07-15 DIAGNOSIS — Z12.5 PROSTATE CANCER SCREENING: ICD-10-CM

## 2020-07-15 DIAGNOSIS — R97.20 ELEVATED PROSTATE SPECIFIC ANTIGEN (PSA): Primary | ICD-10-CM

## 2020-07-15 RX ORDER — PREDNISONE 20 MG/1
TABLET ORAL
Qty: 20 TABLET | Refills: 0 | Status: SHIPPED | OUTPATIENT
Start: 2020-07-15 | End: 2021-02-18

## 2020-07-21 DIAGNOSIS — Z12.5 PROSTATE CANCER SCREENING: ICD-10-CM

## 2020-07-21 LAB — PSA SERPL-ACNC: 5.75 UG/L (ref 0–4)

## 2020-07-21 PROCEDURE — G0103 PSA SCREENING: HCPCS | Performed by: FAMILY MEDICINE

## 2020-07-21 PROCEDURE — 36415 COLL VENOUS BLD VENIPUNCTURE: CPT | Performed by: FAMILY MEDICINE

## 2020-08-04 ENCOUNTER — TELEPHONE (OUTPATIENT)
Dept: FAMILY MEDICINE | Facility: CLINIC | Age: 65
End: 2020-08-04

## 2020-08-04 DIAGNOSIS — R93.7 ABNORMAL FINDINGS ON DIAGNOSTIC IMAGING OF OTHER PARTS OF MUSCULOSKELETAL SYSTEM: ICD-10-CM

## 2020-08-04 DIAGNOSIS — M25.50 MULTIPLE JOINT PAIN: Primary | ICD-10-CM

## 2020-08-12 DIAGNOSIS — M25.50 MULTIPLE JOINT PAIN: ICD-10-CM

## 2020-08-12 DIAGNOSIS — R93.7 ABNORMAL FINDINGS ON DIAGNOSTIC IMAGING OF OTHER PARTS OF MUSCULOSKELETAL SYSTEM: ICD-10-CM

## 2020-08-12 LAB
B BURGDOR IGG+IGM SER QL: 0.72 (ref 0–0.89)
BASOPHILS # BLD AUTO: 0 10E9/L (ref 0–0.2)
BASOPHILS NFR BLD AUTO: 0.4 %
CRP SERPL-MCNC: <2.9 MG/L (ref 0–8)
DIFFERENTIAL METHOD BLD: ABNORMAL
EOSINOPHIL # BLD AUTO: 0.1 10E9/L (ref 0–0.7)
EOSINOPHIL NFR BLD AUTO: 3 %
ERYTHROCYTE [DISTWIDTH] IN BLOOD BY AUTOMATED COUNT: 14.9 % (ref 10–15)
ERYTHROCYTE [SEDIMENTATION RATE] IN BLOOD BY WESTERGREN METHOD: 7 MM/H (ref 0–20)
HCT VFR BLD AUTO: 45.2 % (ref 40–53)
HGB BLD-MCNC: 14.6 G/DL (ref 13.3–17.7)
LYMPHOCYTES # BLD AUTO: 1.3 10E9/L (ref 0.8–5.3)
LYMPHOCYTES NFR BLD AUTO: 26.5 %
MCH RBC QN AUTO: 26.2 PG (ref 26.5–33)
MCHC RBC AUTO-ENTMCNC: 32.3 G/DL (ref 31.5–36.5)
MCV RBC AUTO: 81 FL (ref 78–100)
MONOCYTES # BLD AUTO: 0.4 10E9/L (ref 0–1.3)
MONOCYTES NFR BLD AUTO: 7.4 %
NEUTROPHILS # BLD AUTO: 3 10E9/L (ref 1.6–8.3)
NEUTROPHILS NFR BLD AUTO: 62.7 %
PLATELET # BLD AUTO: 235 10E9/L (ref 150–450)
RBC # BLD AUTO: 5.57 10E12/L (ref 4.4–5.9)
URATE SERPL-MCNC: 4.5 MG/DL (ref 3.5–7.2)
WBC # BLD AUTO: 4.7 10E9/L (ref 4–11)

## 2020-08-12 PROCEDURE — 83655 ASSAY OF LEAD: CPT | Mod: 90 | Performed by: FAMILY MEDICINE

## 2020-08-12 PROCEDURE — 86618 LYME DISEASE ANTIBODY: CPT | Performed by: FAMILY MEDICINE

## 2020-08-12 PROCEDURE — 36415 COLL VENOUS BLD VENIPUNCTURE: CPT | Performed by: FAMILY MEDICINE

## 2020-08-12 PROCEDURE — 86038 ANTINUCLEAR ANTIBODIES: CPT | Performed by: FAMILY MEDICINE

## 2020-08-12 PROCEDURE — 85025 COMPLETE CBC W/AUTO DIFF WBC: CPT | Performed by: FAMILY MEDICINE

## 2020-08-12 PROCEDURE — 86140 C-REACTIVE PROTEIN: CPT | Performed by: FAMILY MEDICINE

## 2020-08-12 PROCEDURE — 86431 RHEUMATOID FACTOR QUANT: CPT | Performed by: FAMILY MEDICINE

## 2020-08-12 PROCEDURE — 84550 ASSAY OF BLOOD/URIC ACID: CPT | Performed by: FAMILY MEDICINE

## 2020-08-12 PROCEDURE — 84202 ASSAY RBC PROTOPORPHYRIN: CPT | Mod: 90 | Performed by: FAMILY MEDICINE

## 2020-08-12 PROCEDURE — 99000 SPECIMEN HANDLING OFFICE-LAB: CPT | Performed by: FAMILY MEDICINE

## 2020-08-12 PROCEDURE — 85652 RBC SED RATE AUTOMATED: CPT | Performed by: FAMILY MEDICINE

## 2020-08-13 LAB
ANA SER QL IF: NEGATIVE
RHEUMATOID FACT SER NEPH-ACNC: 13 IU/ML (ref 0–20)

## 2020-08-17 LAB — LEAD BLDV-MCNC: 14.3 UG/DL

## 2020-08-20 LAB
LEAD BLD-MCNC: 15.4 UG/DL
ZPP RBC-MCNC: 19 UG/DL (ref 0–40)
ZPP RBC-SRTO: 32 UMOLZPP/MOLHEM (ref 0–69)

## 2020-08-26 DIAGNOSIS — M15.9 OSTEOARTHRITIS OF MULTIPLE JOINTS, UNSPECIFIED OSTEOARTHRITIS TYPE: Primary | ICD-10-CM

## 2020-08-31 ENCOUNTER — TELEPHONE (OUTPATIENT)
Dept: FAMILY MEDICINE | Facility: CLINIC | Age: 65
End: 2020-08-31

## 2020-08-31 DIAGNOSIS — M17.0 OSTEOARTHRITIS OF BOTH KNEES, UNSPECIFIED OSTEOARTHRITIS TYPE: Primary | ICD-10-CM

## 2020-08-31 DIAGNOSIS — M25.50 MULTIPLE JOINT PAIN: ICD-10-CM

## 2020-09-14 ENCOUNTER — OFFICE VISIT (OUTPATIENT)
Dept: ORTHOPEDICS | Facility: CLINIC | Age: 65
End: 2020-09-14
Payer: OTHER MISCELLANEOUS

## 2020-09-14 VITALS
DIASTOLIC BLOOD PRESSURE: 96 MMHG | WEIGHT: 223 LBS | BODY MASS INDEX: 30.2 KG/M2 | HEIGHT: 72 IN | SYSTOLIC BLOOD PRESSURE: 136 MMHG | OXYGEN SATURATION: 97 % | HEART RATE: 76 BPM

## 2020-09-14 DIAGNOSIS — M17.0 PRIMARY OSTEOARTHRITIS OF BOTH KNEES: Primary | ICD-10-CM

## 2020-09-14 PROCEDURE — 20610 DRAIN/INJ JOINT/BURSA W/O US: CPT | Performed by: ORTHOPAEDIC SURGERY

## 2020-09-14 RX ORDER — METHYLPREDNISOLONE ACETATE 80 MG/ML
160 INJECTION, SUSPENSION INTRA-ARTICULAR; INTRALESIONAL; INTRAMUSCULAR; SOFT TISSUE
Status: DISCONTINUED | OUTPATIENT
Start: 2020-09-14 | End: 2021-01-20

## 2020-09-14 RX ORDER — LIDOCAINE HYDROCHLORIDE 10 MG/ML
5 INJECTION, SOLUTION INFILTRATION; PERINEURAL
Status: DISCONTINUED | OUTPATIENT
Start: 2020-09-14 | End: 2021-01-20

## 2020-09-14 RX ADMIN — METHYLPREDNISOLONE ACETATE 160 MG: 80 INJECTION, SUSPENSION INTRA-ARTICULAR; INTRALESIONAL; INTRAMUSCULAR; SOFT TISSUE at 08:38

## 2020-09-14 RX ADMIN — LIDOCAINE HYDROCHLORIDE 5 ML: 10 INJECTION, SOLUTION INFILTRATION; PERINEURAL at 08:38

## 2020-09-14 ASSESSMENT — MIFFLIN-ST. JEOR: SCORE: 1849.52

## 2020-09-14 NOTE — PROGRESS NOTES
Large Joint Injection/Arthocentesis: bilateral knee    Date/Time: 9/14/2020 8:38 AM  Performed by: Josh Dimas MD  Authorized by: Josh Dimas MD     Indications:  Pain and osteoarthritis  Needle Size:  22 G  Guidance: landmark guided    Location:  Knee  Laterality:  Bilateral      Medications (Right):  160 mg methylPREDNISolone 80 MG/ML; 5 mL lidocaine 1 %  Medications (Left):  160 mg methylPREDNISolone 80 MG/ML; 5 mL lidocaine 1 %  Outcome:  Tolerated well, no immediate complications  Procedure discussed: discussed risks, benefits, and alternatives    Consent Given by:  Patient  Timeout: timeout called immediately prior to procedure    Prep: patient was prepped and draped in usual sterile fashion

## 2020-09-14 NOTE — LETTER
9/14/2020         RE: Enrico Leos  [address] [place] [address]        Dear Colleague,    Thank you for referring your patient, Enrico Leos, to the [facility]. Please see a copy of my visit note below.      Large Joint Injection/Arthocentesis: bilateral knee    Date/Time: 9/14/2020 8:38 AM  Performed by: Josh Dimas MD  Authorized by: Josh Dimas MD     Indications:  Pain and osteoarthritis  Needle Size:  22 G  Guidance: landmark guided    Location:  Knee  Laterality:  Bilateral      Medications (Right):  160 mg methylPREDNISolone 80 MG/ML; 5 mL lidocaine 1 %  Medications (Left):  160 mg methylPREDNISolone 80 MG/ML; 5 mL lidocaine 1 %  Outcome:  Tolerated well, no immediate complications  Procedure discussed: discussed risks, benefits, and alternatives    Consent Given by:  Patient  Timeout: timeout called immediately prior to procedure    Prep: patient was prepped and draped in usual sterile fashion            Follow up bilateral knee primary osteoarthritis.  Prior Synvisc caused joint irritation.  Does better with steroid.  Likes double dose for left knee.  Last injection 5/11/20  Range of motion 0-125 degrees .    He  desires injection today of bilateral knee(s).  Risks, benefits, potential complications and alternatives were discussed.   With the patient's consent, sterile prep was performed of bilateral knee(s).  Each knee was injected with  Depo Medrol 160 mg and lidocaine at anteromedial sites.  Return to clinic as needed.         Again, thank you for allowing me to participate in the care of your patient.        Sincerely,        Josh Dimas MD

## 2020-09-14 NOTE — PROGRESS NOTES
Follow up bilateral knee primary osteoarthritis.  Prior Synvisc caused joint irritation.  Does better with steroid.  Likes double dose for left knee.  Last injection 5/11/20  Range of motion 0-125 degrees .    He  desires injection today of bilateral knee(s).  Risks, benefits, potential complications and alternatives were discussed.   With the patient's consent, sterile prep was performed of bilateral knee(s).  Each knee was injected with  Depo Medrol 160 mg and lidocaine at anteromedial sites.  Return to clinic as needed.

## 2020-09-17 ENCOUNTER — TELEPHONE (OUTPATIENT)
Dept: FAMILY MEDICINE | Facility: CLINIC | Age: 65
End: 2020-09-17

## 2020-09-17 DIAGNOSIS — J32.9 SINUSITIS, UNSPECIFIED CHRONICITY, UNSPECIFIED LOCATION: Primary | ICD-10-CM

## 2020-09-17 RX ORDER — AZITHROMYCIN 250 MG/1
TABLET, FILM COATED ORAL
Qty: 6 TABLET | Refills: 0 | Status: SHIPPED | OUTPATIENT
Start: 2020-09-17 | End: 2020-09-18

## 2020-09-17 NOTE — TELEPHONE ENCOUNTER
Has one week of his usual sinus symptoms. No fever.  Will call in antibiotic.  Consider ENT consult

## 2020-09-18 ENCOUNTER — TELEPHONE (OUTPATIENT)
Dept: FAMILY MEDICINE | Facility: CLINIC | Age: 65
End: 2020-09-18

## 2020-09-18 DIAGNOSIS — I10 ESSENTIAL HYPERTENSION: ICD-10-CM

## 2020-09-18 DIAGNOSIS — J32.9 SINUSITIS, UNSPECIFIED CHRONICITY, UNSPECIFIED LOCATION: ICD-10-CM

## 2020-09-18 RX ORDER — AZITHROMYCIN 250 MG/1
TABLET, FILM COATED ORAL
Qty: 6 TABLET | Refills: 0 | Status: SHIPPED | OUTPATIENT
Start: 2020-09-18 | End: 2022-04-25

## 2020-09-21 RX ORDER — METOPROLOL SUCCINATE 50 MG/1
TABLET, EXTENDED RELEASE ORAL
Qty: 90 TABLET | Refills: 1 | Status: SHIPPED | OUTPATIENT
Start: 2020-09-21 | End: 2021-01-20

## 2020-09-21 NOTE — TELEPHONE ENCOUNTER
"Routing refill request to provider for review/approval because:  Requested Prescriptions   Pending Prescriptions Disp Refills    metoprolol succinate ER (TOPROL-XL) 50 MG 24 hr tablet [Pharmacy Med Name: METOPROLOL SUCC ER 50 MG TAB] 90 tablet 1     Sig: TAKE 1 TABLET BY MOUTH EVERY DAY       Beta-Blockers Protocol Failed - 9/18/2020  5:26 PM        Failed - Blood pressure under 140/90 in past 12 months     BP Readings from Last 3 Encounters:   09/14/20 (!) 136/96   01/02/20 108/72   12/26/19 132/89                 Passed - Patient is age 6 or older        Passed - Recent (12 mo) or future (30 days) visit within the authorizing provider's specialty     Patient has had an office visit with the authorizing provider or a provider within the authorizing providers department within the previous 12 mos or has a future within next 30 days. See \"Patient Info\" tab in inbasket, or \"Choose Columns\" in Meds & Orders section of the refill encounter.              Passed - Medication is active on med list                 Angela HESTERN, RN, CPN          "

## 2020-11-16 ENCOUNTER — OFFICE VISIT (OUTPATIENT)
Dept: OPTOMETRY | Facility: CLINIC | Age: 65
End: 2020-11-16
Payer: MEDICARE

## 2020-11-16 DIAGNOSIS — Z83.518 FAMILY HISTORY OF MACULAR DEGENERATION: ICD-10-CM

## 2020-11-16 DIAGNOSIS — S05.02XA ABRASION OF LEFT CORNEA, INITIAL ENCOUNTER: ICD-10-CM

## 2020-11-16 DIAGNOSIS — H33.22 LEFT RETINAL DETACHMENT: Primary | ICD-10-CM

## 2020-11-16 DIAGNOSIS — Z98.890 HX OF LASIK: ICD-10-CM

## 2020-11-16 PROCEDURE — 99204 OFFICE O/P NEW MOD 45 MIN: CPT | Performed by: OPTOMETRIST

## 2020-11-16 ASSESSMENT — VISUAL ACUITY
OS_SC: 20/100
OS_SC+: -2
METHOD: SNELLEN - LINEAR
OD_SC: 20/20

## 2020-11-16 ASSESSMENT — EXTERNAL EXAM - RIGHT EYE: OD_EXAM: NORMAL

## 2020-11-16 ASSESSMENT — CONF VISUAL FIELD
OD_NORMAL: 1
OS_INFERIOR_TEMPORAL_RESTRICTION: 2
OS_INFERIOR_NASAL_RESTRICTION: 2
METHOD: COUNTING FINGERS

## 2020-11-16 ASSESSMENT — TONOMETRY
IOP_METHOD: APPLANATION
OD_IOP_MMHG: 14
OS_IOP_MMHG: 14

## 2020-11-16 ASSESSMENT — CUP TO DISC RATIO
OD_RATIO: 0.2
OS_RATIO: 0.2

## 2020-11-16 ASSESSMENT — EXTERNAL EXAM - LEFT EYE: OS_EXAM: NORMAL

## 2020-11-16 ASSESSMENT — SLIT LAMP EXAM - LIDS
COMMENTS: NORMAL
COMMENTS: NORMAL

## 2020-11-16 NOTE — LETTER
[facility]  [address] [facility] [place] [address]  [phone]  Fax [phone]    2020     [facility]  [address], [address]  [place] [address]  [phone], FAX: [phone]    Regarding   Enrico Leos      :    1955    MR#:    [id number]   He will call you on Tuesday morning for retinal detachment evaluation     Dear [facility] PROVIDER,    I advised Enrico Leos to see you for evaluation of left retinal detachment with macula off.  Here is a copy of my chart notes.     Thank you for seeing this patient.    Sincerely,                    Juju Valentin O.D.

## 2020-11-16 NOTE — PROGRESS NOTES
"Chief Complaint   Patient presents with     Eye Problem Left Eye     Eye Problem, left eye, Blurry [facility]      Chief Complaint(s) and History of Present Illness(es)     Eye Problem Left Eye     Laterality: left eye    Onset: sudden    Onset: 3 days ago    Quality: blurred vision and missing vision    Frequency: constantly    Course: stable    Associated symptoms: tearing and photophobia    Treatments tried: artificial tears (Tears in vials )    Response to treatment: mild improvement    Pain scale: 0/10    Comments: Eye Problem, left eye, Blurry VA               Comments     Patient states that a few days ago he rubbed across his eye with 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  Left eye hurt and it felt like he might have scraped or scratched it.  Eye is not really painful, but that he is concerned about the fact that he seems to be \"missing some vision\" on the lower portion of his eye.  He couldn't remember if there was any flashes of light.   He used artificial tears in vials left over from after  LASIK 4-5 yrs ago     He had a previous problem with this same eye  several years ago when he got something in it while snorkling.                  Do you wear contact lenses? No, He had LASIK and uses OTC readers for near tasks       5 yrs ago last exam         Gianna Platt Optometric Assistant      Review of Symptoms    EYES: See HPI, reports he thinks he saw very well a week ago. Change in inferior  Vision left eye  seemed to coincide with eye pain after rubbing left eye.  Eye pain has resolved but change in vision has remained.    CONSTITUTIONAL: patient reports feeling healthy, no headaches , no history blunt trauma to eye          Medical, surgical and family histories reviewed and updated 11/16/2020.         OBJECTIVE: See Ophthalmology exam    ASSESSMENT:    ICD-10-CM    1. Left retinal detachment- macula off  H33.22 EYE ADULT REFERRAL   2. Abrasion of left cornea, initial encounter  S05.02XA    3. Family history of " "macular degeneration -mother wet, hx of smoking  Z83.518    4. 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  [address], [address]  [place] [address]  [phone], FAX: [phone]      Restrict your activities  Goal of preventing eye movements  Can watch TV in the distance  No reading  Sleep with retinal detachment down  Sleep flat if superior retinal detachment and upright for inferior retinal detachment       Macular degeneration prevention  Consider taking a multiple vitamin and  \"eye vitamin\" daily like PreserVision AREDS 2  Goal of 10 mg a day of Lutein and Zeaxanthin.   Healthy things to include in your diet: spinach, colorful fruits and vegetables (especially yellows, oranges, and dark greens)  variety of nuts like walnuts, hazelnuts, almonds    cold water fish like salmon, tuna, sardines             Juju Valentin, OD  [phone]                   "

## 2020-11-16 NOTE — LETTER
"    11/16/2020         RE: Enrico Leos  [address] [place] [address]        Dear Colleague,    Thank you for referring your patient, Enrico Leos, to the [facility]. Please see a copy of my visit note below.    Chief Complaint   Patient presents with     Eye Problem Left Eye     Eye Problem, left eye, Blurry [facility]      Chief Complaint(s) and History of Present Illness(es)     Eye Problem Left Eye     Laterality: left eye    Onset: sudden    Onset: 3 days ago    Quality: blurred vision and missing vision    Frequency: constantly    Course: stable    Associated symptoms: tearing and photophobia    Treatments tried: artificial tears (Tears in vials )    Response to treatment: mild improvement    Pain scale: 0/10    Comments: Eye Problem, left eye, [facility]urry [facility]               Comments     Patient states that a few days ago he rubbed across his eye with 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  Left eye hurt and it felt like he might have scraped or scratched it.  Eye is not really painful, but that he is concerned about the fact that he seems to be \"missing some vision\" on the lower portion of his eye.  He couldn't remember if there was any flashes of light.   He used artificial tears in vials left over from after  LASIK 4-5 yrs ago     He had a previous problem with this same eye  several years ago when he got something in it while snorkling.                  Do you wear contact lenses? No, He had LASIK and uses OTC readers for near tasks       5 yrs ago last exam         Gianna Platt Optometric Assistant      Review of Symptoms    EYES: See HPI, reports he thinks he saw very well a week ago. Change in inferior  Vision left eye  seemed to coincide with eye pain after rubbing left eye.  Eye pain has resolved but change in vision has remained.    CONSTITUTIONAL: patient reports feeling healthy, no headaches , no history blunt trauma to eye          Medical, surgical and family histories " "reviewed and updated 11/16/2020.         OBJECTIVE: See Ophthalmology exam    ASSESSMENT:    ICD-10-CM    1. Left retinal detachment- macula off  H33.22 EYE ADULT REFERRAL   2. Abrasion of left cornea, initial encounter  S05.02XA    3. Family history of macular degeneration -mother wet, hx of smoking  Z83.518    4. 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  [address], [address]  [place] [address]  [phone], FAX: [phone]      Restrict your activities  Goal of preventing eye movements  Can watch TV in the distance  No reading  Sleep with retinal detachment down  Sleep flat if superior retinal detachment and upright for inferior retinal detachment       Macular degeneration prevention  Consider taking a multiple vitamin and  \"eye vitamin\" daily like PreserVision AREDS 2  Goal of 10 mg a day of Lutein and Zeaxanthin.   Healthy things to include in your diet: spinach, colorful fruits and vegetables (especially yellows, oranges, and dark greens)  variety of nuts like walnuts, hazelnuts, almonds    cold water fish like salmon, tuna, sardines             Juju Valentin, OD  [phone]                       Again, thank you for allowing me to participate in the care of your patient.        Sincerely,        Juju Valentin, OD    "

## 2020-11-16 NOTE — PATIENT INSTRUCTIONS
"Refer for VRS for retinal detachment macula off left eye     [facility]  [address], [address]  [place] [address]  [phone], FAX: [phone]      Restrict your activities  Goal of preventing eye movements  Can watch TV in the distance  No reading  Sleep with retinal detachment down  Sleep flat if superior retinal detachment and upright for inferior retinal detachment       Macular degeneration prevention  Consider taking a multiple vitamin and  \"eye vitamin\" daily like PreserVision AREDS 2  Goal of 10 mg a day of Lutein and Zeaxanthin.   Healthy things to include in your diet: spinach, colorful fruits and vegetables (especially yellows, oranges, and dark greens)  variety of nuts like walnuts, hazelnuts, almonds    cold water fish like salmon, tuna, sardines             Juju Valentin, OD  [phone]                "

## 2020-11-17 ENCOUNTER — TRANSFERRED RECORDS (OUTPATIENT)
Dept: HEALTH INFORMATION MANAGEMENT | Facility: CLINIC | Age: 65
End: 2020-11-17

## 2020-11-17 ENCOUNTER — TELEPHONE (OUTPATIENT)
Dept: OPTOMETRY | Facility: CLINIC | Age: 65
End: 2020-11-17

## 2021-01-09 ENCOUNTER — HEALTH MAINTENANCE LETTER (OUTPATIENT)
Age: 66
End: 2021-01-09

## 2021-01-11 DIAGNOSIS — E78.2 MIXED HYPERLIPIDEMIA: ICD-10-CM

## 2021-01-12 RX ORDER — ATORVASTATIN CALCIUM 40 MG/1
TABLET, FILM COATED ORAL
Qty: 90 TABLET | Refills: 3 | Status: SHIPPED | OUTPATIENT
Start: 2021-01-12 | End: 2022-01-15

## 2021-01-12 NOTE — TELEPHONE ENCOUNTER
Routing refill request to provider for review/approval because:  Labs not current:  Lipids    Sarina Keith BSN, RN

## 2021-01-13 ENCOUNTER — TELEPHONE (OUTPATIENT)
Dept: FAMILY MEDICINE | Facility: CLINIC | Age: 66
End: 2021-01-13

## 2021-01-13 DIAGNOSIS — Z12.5 SCREENING FOR PROSTATE CANCER: ICD-10-CM

## 2021-01-13 DIAGNOSIS — Z00.00 ROUTINE GENERAL MEDICAL EXAMINATION AT A HEALTH CARE FACILITY: Primary | ICD-10-CM

## 2021-01-13 DIAGNOSIS — I10 ESSENTIAL HYPERTENSION: ICD-10-CM

## 2021-01-13 NOTE — TELEPHONE ENCOUNTER
Please put him on for a in clinic visit on any of the virtual visit or hospital follow up visit next wednesday

## 2021-01-13 NOTE — TELEPHONE ENCOUNTER
Patient is wondering if her can be seen earlier than Mar 24th for a physical. You are at your Physical limits until Mar 24th. Please advise.  Nusrat Mejia, kennedi

## 2021-01-13 NOTE — TELEPHONE ENCOUNTER
Reason for Call:  Other appointment    Detailed comments: Patient is requesting earlier appt with PCP     Phone Number Patient can be reached at: Home number on file [phone] (home)    Best Time: any    Can we leave a detailed message on this number? YES    Call taken on 1/13/2021 at 9:36 AM by Renzo Howe

## 2021-01-14 NOTE — PATIENT INSTRUCTIONS
Patient Education   Personalized Prevention Plan  You are due for the preventive services outlined below.  Your care team is available to assist you in scheduling these services.  If you have already completed any of these items, please share that information with your care team to update in your medical record.  Health Maintenance Due   Topic Date Due     Zoster (Shingles) Vaccine (1 of 2) 09/15/2007     Basic Metabolic Panel  06/26/2020     Flu Vaccine (1) 09/01/2020     Cholesterol Lab  10/02/2020     Annual Wellness Visit  11/20/2020     PHQ-2  01/01/2021        Preventive Health Recommendations  Male Ages 50 - 64    Yearly exam:             See your health care provider every year in order to  o   Review health changes.   o   Discuss preventive care.    o   Review your medicines if your doctor has prescribed any.     Have a cholesterol test every 5 years, or more frequently if you are at risk for high cholesterol/heart disease.     Have a diabetes test (fasting glucose) every three years. If you are at risk for diabetes, you should have this test more often.     Have a colonoscopy at age 50, or have a yearly FIT test (stool test). These exams will check for colon cancer.      Talk with your health care provider about whether or not a prostate cancer screening test (PSA) is right for you.    You should be tested each year for STDs (sexually transmitted diseases), if you re at risk.     Shots: Get a flu shot each year. Get a tetanus shot every 10 years.     Nutrition:    Eat at least 5 servings of fruits and vegetables daily.     Eat whole-grain bread, whole-wheat pasta and brown rice instead of white grains and rice.     Get adequate Calcium and Vitamin D.     Lifestyle    Exercise for at least 150 minutes a week (30 minutes a day, 5 days a week). This will help you control your weight and prevent disease.     Limit alcohol to one drink per day.     No smoking.     Wear sunscreen to prevent skin cancer.      See your dentist every six months for an exam and cleaning.     See your eye doctor every 1 to 2 years.

## 2021-01-14 NOTE — PROGRESS NOTES
3  SUBJECTIVE:   CC: Enrico Leos is an 65 year old male who presents for preventive health visit.     Patient has been advised of split billing requirements and indicates understanding: Yes     Healthy Habits:    Do you get at least three servings of calcium containing foods daily (dairy, green leafy vegetables, etc.)? yes    Amount of exercise or daily activities, outside of work: 1 day(s) per week    Problems taking medications regularly No    Medication side effects: No    Have you had an eye exam in the past two years? yes    Do you see a dentist twice per year? yes    Do you have sleep apnea, excessive snoring or daytime drowsiness?no      Lab Results   Component Value Date    A1C 8.7 01/20/2021         Today's PHQ-2 Score:   PHQ-2 ( 1999 [employer]) 1/20/2021 11/20/2019   Q1: Little interest or pleasure in doing things 0 0   Q2: Feeling down, depressed or hopeless 0 0   PHQ-2 Score 0 0   Q1: Little interest or pleasure in doing things - -   Q2: Feeling down, depressed or hopeless - -   PHQ-2 Score - -       Abuse: Current or Past(Physical, Sexual or Emotional)- No  Do you feel safe in your environment? Yes        Social History     Tobacco Use     Smoking status: Never Smoker     Smokeless tobacco: Never Used   Substance Use Topics     Alcohol use: Yes     Comment: occasionally     If you drink alcohol do you typically have >3 drinks per day or >7 drinks per week? No                      Last PSA:   PSA   Date Value Ref Range Status   01/15/2021 6.91 (H) 0 - 4 ug/L Final     Comment:     Assay Method:  Chemiluminescence using Siemens Vista analyzer       Reviewed orders with patient. Reviewed health maintenance and updated orders accordingly - Yes       Reviewed and updated as needed this visit by clinical staff  Tobacco  Allergies  Meds   Med Hx  Surg Hx  Fam Hx  Soc Hx        Reviewed and updated as needed this visit by Provider                    ROS:  CONSTITUTIONAL: NEGATIVE for fever, chills,  change in weight  INTEGUMENTARY/SKIN: NEGATIVE for worrisome rashes, moles or lesions  EYES: NEGATIVE for vision changes or irritation  ENT: NEGATIVE for ear, mouth and throat problems  RESP: NEGATIVE for significant cough or SOB  CV: NEGATIVE for chest pain, palpitations or peripheral edema  GI: NEGATIVE for nausea, abdominal pain, heartburn, or change in bowel habits   male: negative for dysuria, hematuria, decreased urinary stream, erectile dysfunction, urethral discharge  MUSCULOSKELETAL: NEGATIVE for significant arthralgias or myalgia  NEURO: NEGATIVE for weakness, dizziness or paresthesias  PSYCHIATRIC: NEGATIVE for changes in mood or affect    OBJECTIVE:   /85   Pulse 114   Temp 97.5  F (36.4  C) (Tympanic)   Resp 16   Ht 1.829 m (6')   Wt 98.9 kg (218 lb)   SpO2 98%   BMI 29.57 kg/m    EXAM:  GENERAL: healthy, alert and no distress  EYES: Eyes grossly normal to inspection, PERRL and conjunctivae and sclerae normal  HENT: ear canals and TM's normal, nose and mouth without ulcers or lesions  NECK: no adenopathy, no asymmetry, masses, or scars and thyroid normal to palpation  RESP: lungs clear to auscultation - no rales, rhonchi or wheezes  CV: regular rate and rhythm, normal S1 S2, no S3 or S4, no murmur, click or rub, no peripheral edema and peripheral pulses strong  ABDOMEN: soft, nontender, no hepatosplenomegaly, no masses and bowel sounds normal  MS: no gross musculoskeletal defects noted, no edema  SKIN: no suspicious lesions or rashes  NEURO: Normal strength and tone, mentation intact and speech normal  PSYCH: mentation appears normal, affect normal/bright    Diagnostic Test Results:  Labs reviewed in Epic    ASSESSMENT/PLAN:       ICD-10-CM    1. Encounter for Medicare annual wellness exam  Z00.00    2. Essential hypertension with goal blood pressure less than 140/90  I10 losartan (COZAAR) 100 MG tablet   3. Essential hypertension  I10 metoprolol succinate ER (TOPROL-XL) 50 MG 24 hr  tablet   4. Routine general medical examination at a health care facility  Z00.00    5. High blood sugar  R73.9 Hemoglobin A1c   6. Type 2 diabetes mellitus treated without insulin (H)  E11.9 metFORMIN (GLUCOPHAGE-XR) 500 MG 24 hr tablet     AMBULATORY ADULT DIABETES EDUCATOR REFERRAL       Patient has been advised of split billing requirements and indicates understanding: Yes  COUNSELING:  Reviewed preventive health counseling, as reflected in patient instructions       Regular exercise       Healthy diet/nutrition    Estimated body mass index is 29.57 kg/m  as calculated from the following:    Height as of this encounter: 1.829 m (6').    Weight as of this encounter: 98.9 kg (218 lb).        He reports that he has never smoked. He has never used smokeless tobacco.      Counseling Resources:  ATP IV Guidelines  Pooled Cohorts Equation Calculator  FRAX Risk Assessment  ICSI Preventive Guidelines  Dietary Guidelines for Americans, 2010  USDA's MyPlate  ASA Prophylaxis  Lung CA Screening    Josh Carter MD  [facility]

## 2021-01-14 NOTE — TELEPHONE ENCOUNTER
To Provider,  I have rescheduled Enrico for a fasting lab tomorrow, 1/14/2021 and his physical on 1/20/2021 in a Team slot as that was all that was available. I have pended two lab orders. The patient would like a PSA lab as well. I have not pended that one as of yet.   Thank you,  Sheila Espino, TC

## 2021-01-15 DIAGNOSIS — I10 ESSENTIAL HYPERTENSION: ICD-10-CM

## 2021-01-15 DIAGNOSIS — Z12.5 SCREENING FOR PROSTATE CANCER: ICD-10-CM

## 2021-01-15 DIAGNOSIS — Z00.00 ROUTINE GENERAL MEDICAL EXAMINATION AT A HEALTH CARE FACILITY: ICD-10-CM

## 2021-01-15 DIAGNOSIS — M15.9 OSTEOARTHRITIS OF MULTIPLE JOINTS, UNSPECIFIED OSTEOARTHRITIS TYPE: ICD-10-CM

## 2021-01-15 LAB
ANION GAP SERPL CALCULATED.3IONS-SCNC: 7 MMOL/L (ref 3–14)
BUN SERPL-MCNC: 18 MG/DL (ref 7–30)
CALCIUM SERPL-MCNC: 9.2 MG/DL (ref 8.5–10.1)
CHLORIDE SERPL-SCNC: 103 MMOL/L (ref 94–109)
CHOLEST SERPL-MCNC: 177 MG/DL
CO2 SERPL-SCNC: 27 MMOL/L (ref 20–32)
CREAT SERPL-MCNC: 1.01 MG/DL (ref 0.66–1.25)
GFR SERPL CREATININE-BSD FRML MDRD: 79 ML/MIN/{1.73_M2}
GLUCOSE SERPL-MCNC: 180 MG/DL (ref 70–99)
HDLC SERPL-MCNC: 39 MG/DL
LDLC SERPL CALC-MCNC: 89 MG/DL
NONHDLC SERPL-MCNC: 138 MG/DL
POTASSIUM SERPL-SCNC: 4.3 MMOL/L (ref 3.4–5.3)
PSA SERPL-ACNC: 6.91 UG/L (ref 0–4)
SODIUM SERPL-SCNC: 137 MMOL/L (ref 133–144)
TRIGL SERPL-MCNC: 243 MG/DL

## 2021-01-15 PROCEDURE — 80061 LIPID PANEL: CPT | Performed by: FAMILY MEDICINE

## 2021-01-15 PROCEDURE — 86200 CCP ANTIBODY: CPT | Performed by: FAMILY MEDICINE

## 2021-01-15 PROCEDURE — G0103 PSA SCREENING: HCPCS | Performed by: FAMILY MEDICINE

## 2021-01-15 PROCEDURE — 36415 COLL VENOUS BLD VENIPUNCTURE: CPT | Performed by: FAMILY MEDICINE

## 2021-01-15 PROCEDURE — 80048 BASIC METABOLIC PNL TOTAL CA: CPT | Performed by: FAMILY MEDICINE

## 2021-01-18 LAB — CCP AB SER IA-ACNC: 2 U/ML

## 2021-01-20 ENCOUNTER — OFFICE VISIT (OUTPATIENT)
Dept: FAMILY MEDICINE | Facility: CLINIC | Age: 66
End: 2021-01-20
Payer: MEDICARE

## 2021-01-20 VITALS
SYSTOLIC BLOOD PRESSURE: 106 MMHG | HEART RATE: 114 BPM | BODY MASS INDEX: 29.53 KG/M2 | TEMPERATURE: 97.5 F | DIASTOLIC BLOOD PRESSURE: 85 MMHG | OXYGEN SATURATION: 98 % | HEIGHT: 72 IN | RESPIRATION RATE: 16 BRPM | WEIGHT: 218 LBS

## 2021-01-20 DIAGNOSIS — I10 ESSENTIAL HYPERTENSION WITH GOAL BLOOD PRESSURE LESS THAN 140/90: ICD-10-CM

## 2021-01-20 DIAGNOSIS — I10 ESSENTIAL HYPERTENSION: ICD-10-CM

## 2021-01-20 DIAGNOSIS — R73.9 HIGH BLOOD SUGAR: ICD-10-CM

## 2021-01-20 DIAGNOSIS — Z00.00 ENCOUNTER FOR MEDICARE ANNUAL WELLNESS EXAM: Primary | ICD-10-CM

## 2021-01-20 DIAGNOSIS — Z00.00 ROUTINE GENERAL MEDICAL EXAMINATION AT A HEALTH CARE FACILITY: ICD-10-CM

## 2021-01-20 DIAGNOSIS — E11.9 TYPE 2 DIABETES MELLITUS TREATED WITHOUT INSULIN (H): ICD-10-CM

## 2021-01-20 LAB — HBA1C MFR BLD: 8.7 % (ref 0–5.6)

## 2021-01-20 PROCEDURE — 99212 OFFICE O/P EST SF 10 MIN: CPT | Mod: 25 | Performed by: FAMILY MEDICINE

## 2021-01-20 PROCEDURE — 99396 PREV VISIT EST AGE 40-64: CPT | Performed by: FAMILY MEDICINE

## 2021-01-20 PROCEDURE — 83036 HEMOGLOBIN GLYCOSYLATED A1C: CPT | Performed by: FAMILY MEDICINE

## 2021-01-20 PROCEDURE — 36415 COLL VENOUS BLD VENIPUNCTURE: CPT | Performed by: FAMILY MEDICINE

## 2021-01-20 RX ORDER — METFORMIN HCL 500 MG
500 TABLET, EXTENDED RELEASE 24 HR ORAL 2 TIMES DAILY WITH MEALS
Qty: 180 TABLET | Refills: 0 | Status: SHIPPED | OUTPATIENT
Start: 2021-01-20 | End: 2021-04-12

## 2021-01-20 RX ORDER — LOSARTAN POTASSIUM 100 MG/1
100 TABLET ORAL DAILY
Qty: 90 TABLET | Refills: 1 | Status: SHIPPED | OUTPATIENT
Start: 2021-01-20 | End: 2021-09-02

## 2021-01-20 RX ORDER — METOPROLOL SUCCINATE 50 MG/1
50 TABLET, EXTENDED RELEASE ORAL DAILY
Qty: 90 TABLET | Refills: 1 | Status: SHIPPED | OUTPATIENT
Start: 2021-01-20 | End: 2021-09-02

## 2021-01-20 ASSESSMENT — MIFFLIN-ST. JEOR: SCORE: 1821.84

## 2021-02-18 ENCOUNTER — ALLIED HEALTH/NURSE VISIT (OUTPATIENT)
Dept: EDUCATION SERVICES | Facility: CLINIC | Age: 66
End: 2021-02-18
Payer: MEDICARE

## 2021-02-18 DIAGNOSIS — E11.9 DIABETES MELLITUS WITHOUT COMPLICATION (H): Primary | ICD-10-CM

## 2021-02-18 DIAGNOSIS — E11.9 TYPE 2 DIABETES MELLITUS TREATED WITHOUT INSULIN (H): ICD-10-CM

## 2021-02-18 PROCEDURE — G0108 DIAB MANAGE TRN  PER INDIV: HCPCS

## 2021-02-18 PROCEDURE — 99207 PR DROP WITH A PROCEDURE: CPT

## 2021-02-18 RX ORDER — BLOOD SUGAR DIAGNOSTIC
STRIP MISCELLANEOUS
Qty: 200 STRIP | Refills: 11 | Status: SHIPPED | OUTPATIENT
Start: 2021-02-18 | End: 2022-03-16

## 2021-02-18 RX ORDER — LANCETS 33 GAUGE
1 EACH MISCELLANEOUS 2 TIMES DAILY
Qty: 100 EACH | Refills: 11 | Status: SHIPPED | OUTPATIENT
Start: 2021-02-18 | End: 2024-05-21

## 2021-02-18 NOTE — PATIENT INSTRUCTIONS
Diabetes Support Resources:  1. Start back on Metformin  mg take 1 with dinner X 1 wk, then add a second pill     2. Follow the meal plan of carbs and proteins    3. Continue to exercise    4. Check your BG when you wake up ()    5. Check 2 hrs after a meal (<180)       Bring blood glucose meter and logbook with you to all doctor and follow-up appointments.    Diabetes Education Telephone Visit Follow-up:    We realize your time is valuable and your health is important! We offer a convenient Telephone Visit follow up! It s a quick way to check in for a medication dose adjustment without having to come back to clinic as soon.    Telephone Visits are often covered by insurance. Please check with your insurance plan to see if this type of visit is covered. If not, the cost is less expensive than an office visit:      Up to 10 minutes (Code 03729): $30    11-20 minutes (Code 04965): $59    More than 20 minutes (Code 58749): $85    Talk with your Diabetes Educator if you want to learn more.      [facility]:  For Your Diabetes Education and Nutrition Appointments Call:  [phone]   For Diabetes Education or Nutrition Related Questions:   Phone: [phone]  Send [employer] Message   If you need a medication refill please contact your pharmacy. Please allow 3 business days for your refills to be completed.

## 2021-02-18 NOTE — PROGRESS NOTES
Diabetes Self-Management Education & Support    Presents for: Initial Assessment for new diagnosis    SUBJECTIVE/OBJECTIVE:  Presents for: Initial Assessment for new diagnosis  Accompanied by: Self  Diabetes education in the past 24mo: No  Focus of Visit: Monitoring, Taking Medication, Healthy Eating, Diabetes Pathophysiology, Assistance w/ making life changes  Diabetes type: Type 2  Date of diagnosis: wants to be under control  Disease course: Worsening  Transportation concerns: No  Other concerns:: None  Cultural Influences/Ethnic Background:  American      Diabetes Symptoms & Complications:  Fatigue: Sometimes  Neuropathy: Sometimes  Polydipsia: Sometimes  Polyphagia: No  Polyuria: Sometimes  Visual change: No  Slow healing wounds: No  Symptom course: Improving  Weight trend: Decreasing  Complications assessed today?: Yes  Autonomic neuropathy: No  CVA: No  Heart disease: No  Nephropathy: No  Peripheral neuropathy: No  Peripheral Vascular Disease: No  Retinopathy: Yes(detatched retina)    Patient Problem List and Family Medical History reviewed for relevant medical history, current medical status, and diabetes risk factors.    Vitals:  There were no vitals taken for this visit.  Estimated body mass index is 29.57 kg/m  as calculated from the following:    Height as of 1/20/21: 1.829 m (6').    Weight as of 1/20/21: 98.9 kg (218 lb).   Last 3 BP:   BP Readings from Last 3 Encounters:   01/20/21 106/85   09/14/20 (!) 136/96   01/02/20 108/72       History   Smoking Status     Never Smoker   Smokeless Tobacco     Never Used       Labs:  Lab Results   Component Value Date    A1C 8.7 01/20/2021     Lab Results   Component Value Date     01/15/2021     Lab Results   Component Value Date    LDL 89 01/15/2021     HDL Cholesterol   Date Value Ref Range Status   01/15/2021 39 (L) >39 mg/dL Final   ]  GFR Estimate   Date Value Ref Range Status   01/15/2021 79 >60 mL/min/[1.73_m2] Final     Comment:     Non   American GFR Calc  Starting 12/18/2018, serum creatinine based estimated GFR (eGFR) will be   calculated using the Chronic Kidney Disease Epidemiology Collaboration   (CKD-EPI) equation.       GFR Estimate If Black   Date Value Ref Range Status   01/15/2021 >90 >60 mL/min/[1.73_m2] Final     Comment:      GFR Calc  Starting 12/18/2018, serum creatinine based estimated GFR (eGFR) will be   calculated using the Chronic Kidney Disease Epidemiology Collaboration   (CKD-EPI) equation.       Lab Results   Component Value Date    CR 1.01 01/15/2021     No results found for: MICROALBUMIN    Healthy Eating:  Healthy Eating Assessed Today: Yes  Cultural/Islam diet restrictions?: No  Meal planning/habits: Avoiding sweets, Calorie counting, Carb counting, Low carb, Heart healthy, Smaller portions  How many times a week on average do you eat food made away from home (restaurant/take-out)?: 1  Meals include: Breakfast, Lunch  Breakfast: coffee, whole grain toast, or an egg, fruit, oranges or bananas  Lunch: can of soup, may have some cuties  Dinner: chowmein , home made, veggies and turkey meat, usualy chicken and fish and turkey, more salads  Snacks: cuties for snack,  Beverages: Water, Tea, Coffee, Alcohol(may have a glass of wine sometime)  Has patient met with a dietitian in the past?: No    Being Active:  Being Active Assessed Today: Yes  Exercise:: Yes  Days per week of moderate to strenuous exercise (like a brisk walk): 2  On average, minutes per day of exercise at this level: 30  How intense was your typical exercise? : Moderate (like brisk walking)  Exercise Minutes per Week: 60  Barrier to exercise: None    Monitoring:  Monitoring Assessed Today: Yes  Did patient bring glucose meter to appointment? : No  Blood Glucose Meter: Unknown, One Touch  Times checking blood sugar at home (number): Never  Blood glucose trend: Increasing    Has not been checking    Taking Medications:  Diabetes Medication(s)      Biguanides       metFORMIN (GLUCOPHAGE-XR) 500 MG 24 hr tablet    Take 1 tablet (500 mg) by mouth 2 times daily (with meals)          Current Treatments: Diet  Problems taking diabetes medications regularly?: Yes  Diabetes medication side effects?: Yes(loose stools)    Problem Solving:  Problem Solving Assessed Today: No              Reducing Risks:  Diabetes Risks: Age over 45 years, Hypertriglyceridemia, Hyperlipidemia  CAD Risks: Diabetes Mellitus, Dyslipidemia, Family history, Stress, Male sex  Has dilated eye exam at least once a year?: Yes  Sees dentist every 6 months?: Yes  Feet checked by healthcare provider in the last year?: Yes    Healthy Coping:  Healthy Coping Assessed Today: Yes  Emotional response to diabetes: Ready to learn  Informal Support system:: Children, Family, Friends, Partner  Stage of change: PREPARATION (Decided to change - considering how)  Patient Activation Measure Survey Score:  NANCY Score (Last Two) 9/14/2011 1/20/2021   NANCY Raw Score 45 40   Activation Score 73.1 100   NANCY Level 4 4       Diabetes knowledge and skills assessment:   Patient is knowledgeable in diabetes management concepts related to: Taking Medication    Patient needs further education on the following diabetes management concepts: Healthy Eating, Being Active, Monitoring, Taking Medication and Healthy Coping    Based on learning assessment above, most appropriate setting for further diabetes education would be: Individual setting.      INTERVENTIONS:    Education provided today on:  AADE Self-Care Behaviors:  Diabetes Pathophysiology  Healthy Eating: carbohydrate counting, consistency in amount, composition, and timing of food intake, heart healthy diet, eating out, portion control, plate planning method and label reading  Being Active: relationship to blood glucose and describe appropriate activity program  Monitoring: purpose, proper technique, log and interpret results, individual blood glucose targets and  frequency of monitoring  Taking Medication: action of prescribed medication, side effects of prescribed medications and when to take medications  Healthy Coping: recognize feelings about diagnosis, benefits of making appropriate lifestyle changes and utilize support systems  Patient was instructed on One Touch Verio Flex meter and was able to provide an accurate return demonstration. Patient's blood glucose reading today was  mg/dL.    Opportunities for ongoing education and support in diabetes-self management were discussed.    Pt verbalized understanding of concepts discussed and recommendations provided today.       Education Materials Provided:  [facility] Understanding Diabetes Booklet, Carbohydrate Counting, My Plate Planner and One Touch Verio Flex meter kit      ASSESSMENT:  Enrico is newly DX with diabetes, he was put in Metformin , but stopped as he did not like the side effects.  He admits he did not know how important the medication is.  He will begin to take 2 tabs with dinner. Will return to evaluate and add more Metformin next visit.         Patient's most recent   Lab Results   Component Value Date    A1C 8.7 01/20/2021    is not meeting goal of <7.0    PLAN  See Patient Instructions for co-developed, patient-stated behavior change goals.  AVS printed and provided to patient today. See Follow-Up section for recommended follow-up.    Alley Younger RN/PAULINA Varela Diabetes Educator    Time Spent: 60 minutes  Encounter Type: Individual    Any diabetes medication dose changes were made via the CDE Protocol and Collaborative Practice Agreement with the patient's primary care provider. A copy of this encounter was shared with the provider.

## 2021-02-23 DIAGNOSIS — I10 ESSENTIAL HYPERTENSION WITH GOAL BLOOD PRESSURE LESS THAN 140/90: ICD-10-CM

## 2021-02-23 RX ORDER — LOSARTAN POTASSIUM 100 MG/1
TABLET ORAL
Qty: 90 TABLET | Refills: 1 | OUTPATIENT
Start: 2021-02-23

## 2021-03-17 ENCOUNTER — ALLIED HEALTH/NURSE VISIT (OUTPATIENT)
Dept: EDUCATION SERVICES | Facility: CLINIC | Age: 66
End: 2021-03-17
Payer: MEDICARE

## 2021-03-17 DIAGNOSIS — E11.9 DIABETES MELLITUS WITHOUT COMPLICATION (H): Primary | ICD-10-CM

## 2021-03-17 PROCEDURE — 99207 PR DROP WITH A PROCEDURE: CPT

## 2021-03-17 PROCEDURE — G0108 DIAB MANAGE TRN  PER INDIV: HCPCS

## 2021-03-17 NOTE — PATIENT INSTRUCTIONS
Diabetes Support Resources:  1. Continue to check BG as you have been , call either myself or Dr. Carter if you start to see BG > 250 a few days in a row.    2. Recommend take Metformin  mg , 2 tabs with breakfast and 2 tabs with dinner    3. Try to exercise just walking when you can.     Bring blood glucose meter and logbook with you to all doctor and follow-up appointments.    Diabetes Education Telephone Visit Follow-up:    We realize your time is valuable and your health is important! We offer a convenient Telephone Visit follow up! It s a quick way to check in for a medication dose adjustment without having to come back to clinic as soon.    Telephone Visits are often covered by insurance. Please check with your insurance plan to see if this type of visit is covered. If not, the cost is less expensive than an office visit:      Up to 10 minutes (Code 38543): $30    11-20 minutes (Code 92535): $59    More than 20 minutes (Code 80429): $85    Talk with your Diabetes Educator if you want to learn more.      [facility]:  For Your Diabetes Education and Nutrition Appointments Call:  [phone]   For Diabetes Education or Nutrition Related Questions:   Phone: [phone]  Send [employer] Message   If you need a medication refill please contact your pharmacy. Please allow 3 business days for your refills to be completed.

## 2021-03-17 NOTE — PROGRESS NOTES
Diabetes Self-Management Education & Support    Presents for: Follow-up    SUBJECTIVE/OBJECTIVE:  Presents for: Follow-up  Accompanied by: Self  Diabetes education in the past 24mo: No  Focus of Visit: Monitoring, Taking Medication, Healthy Eating, Diabetes Pathophysiology, Assistance w/ making life changes  Diabetes type: Type 2  Date of diagnosis: wants to be under control  Disease course: Worsening  Transportation concerns: No  Other concerns:: None  Cultural Influences/Ethnic Background:  American      Diabetes Symptoms & Complications:  Fatigue: Sometimes  Neuropathy: Sometimes  Polydipsia: Sometimes  Polyphagia: No  Polyuria: Sometimes  Visual change: No  Slow healing wounds: No  Symptom course: Improving  Weight trend: Decreasing  Complications assessed today?: Yes  Autonomic neuropathy: No  CVA: No  Heart disease: No  Nephropathy: No  Peripheral neuropathy: No  Peripheral Vascular Disease: No  Retinopathy: Yes(detatched retina)    Patient Problem List and Family Medical History reviewed for relevant medical history, current medical status, and diabetes risk factors.    Vitals:  There were no vitals taken for this visit.  Estimated body mass index is 29.57 kg/m  as calculated from the following:    Height as of 1/20/21: 1.829 m (6').    Weight as of 1/20/21: 98.9 kg (218 lb).   Last 3 BP:   BP Readings from Last 3 Encounters:   01/20/21 106/85   09/14/20 (!) 136/96   01/02/20 108/72       History   Smoking Status     Never Smoker   Smokeless Tobacco     Never Used       Labs:  Lab Results   Component Value Date    A1C 8.7 01/20/2021     Lab Results   Component Value Date     01/15/2021     Lab Results   Component Value Date    LDL 89 01/15/2021     HDL Cholesterol   Date Value Ref Range Status   01/15/2021 39 (L) >39 mg/dL Final   ]  GFR Estimate   Date Value Ref Range Status   01/15/2021 79 >60 mL/min/[1.73_m2] Final     Comment:     Non  GFR Calc  Starting 12/18/2018, serum creatinine  based estimated GFR (eGFR) will be   calculated using the Chronic Kidney Disease Epidemiology Collaboration   (CKD-EPI) equation.       GFR Estimate If Black   Date Value Ref Range Status   01/15/2021 >90 >60 mL/min/[1.73_m2] Final     Comment:      GFR Calc  Starting 12/18/2018, serum creatinine based estimated GFR (eGFR) will be   calculated using the Chronic Kidney Disease Epidemiology Collaboration   (CKD-EPI) equation.       Lab Results   Component Value Date    CR 1.01 01/15/2021     No results found for: MICROALBUMIN    Healthy Eating:  Healthy Eating Assessed Today: Yes  Cultural/Holiness diet restrictions?: No  Meal planning/habits: Avoiding sweets, Calorie counting, Carb counting, Low carb, Heart healthy, Smaller portions  How many times a week on average do you eat food made away from home (restaurant/take-out)?: 1  Meals include: Breakfast, Lunch  Breakfast: coffee, whole grain toast, or an egg, fruit, oranges or bananas  Lunch: can of soup, may have some cuties now subway some days  Dinner: chowmein , home made, veggies and turkey meat, usualy chicken and fish and turkey, more salads  Snacks: cuties for snack,  Beverages: Water, Tea, Coffee, Alcohol(may have a glass of wine sometime)  Has patient met with a dietitian in the past?: No    Being Active:  Being Active Assessed Today: Yes  Exercise:: Yes  Days per week of moderate to strenuous exercise (like a brisk walk): 2  On average, minutes per day of exercise at this level: 30  How intense was your typical exercise? : Moderate (like brisk walking)  Exercise Minutes per Week: 60  Barrier to exercise: None    Monitoring:  Monitoring Assessed Today: Yes  Did patient bring glucose meter to appointment? : No  Blood Glucose Meter: Unknown, One Touch  Times checking blood sugar at home (number): 2  Times checking blood sugar at home (per): Day  Blood glucose trend: Decreasing        Taking Medications:  Diabetes Medication(s)     Biguanides        metFORMIN (GLUCOPHAGE-XR) 500 MG 24 hr tablet    Take 1 tablet (500 mg) by mouth 2 times daily (with meals)          Taking Medication Assessed Today: Yes  Current Treatments: Diet, Oral Medication (taken by mouth)  Problems taking diabetes medications regularly?: Yes  Diabetes medication side effects?: Yes(loose stools)    Problem Solving:  Problem Solving Assessed Today: No              Reducing Risks:  Diabetes Risks: Age over 45 years, Hypertriglyceridemia, Hyperlipidemia  CAD Risks: Diabetes Mellitus, Dyslipidemia, Family history, Stress, Male sex  Has dilated eye exam at least once a year?: Yes  Sees dentist every 6 months?: Yes  Feet checked by healthcare provider in the last year?: Yes    Healthy Coping:  Healthy Coping Assessed Today: Yes  Emotional response to diabetes: Ready to learn  Informal Support system:: Children, Family, Friends, Partner  Stage of change: ACTION (Actively working towards change)  Patient Activation Measure Survey Score:  NANCY Score (Last Two) 9/14/2011 1/20/2021   NANCY Raw Score 45 40   Activation Score 73.1 100   NANCY Level 4 4       Diabetes knowledge and skills assessment:   Patient is knowledgeable in diabetes management concepts related to: Healthy Eating, Being Active, Monitoring, Taking Medication and Healthy Coping    Patient needs further education on the following diabetes management concepts: Healthy Coping    Based on learning assessment above, most appropriate setting for further diabetes education would be: Individual setting.      INTERVENTIONS:    Education provided today on:  AADE Self-Care Behaviors:  Healthy Eating: carbohydrate counting, consistency in amount, composition, and timing of food intake, heart healthy diet, portion control, plate planning method and label reading  Being Active: relationship to blood glucose and describe appropriate activity program  Taking Medication: action of prescribed medication, side effects of prescribed medications and when  to take medications  Reducing Risks: major complications of diabetes and prevention, early diagnostic measures and treatment of complications  Healthy Coping: recognize feelings about diagnosis, benefits of making appropriate lifestyle changes, utilize support systems, methods for coping with stress and when to seek professional counseling    Opportunities for ongoing education and support in diabetes-self management were discussed.    Pt verbalized understanding of concepts discussed and recommendations provided today.       Education Materials Provided:  No new materials provided today      ASSESSMENT:  Enrico comes as a follow up with new DX.  He was very strong in his feelings to come off of Metformin soon, has stopped it some days.  Does not believe he should follow the meal plan, likes what he is doing and does research on diabetes on facebook and media.    I did go over the side effects of poor management and the fact that diabetes does progress, and what the role of Metformin is in relationship of his diabetes and management.  At this time he feels he is doing the correct course of management, will refer back to Dr. Carter for follow up        Patient's most recent   Lab Results   Component Value Date    A1C 8.7 01/20/2021    is not meeting goal of <7.0    PLAN  See Patient Instructions for co-developed, patient-stated behavior change goals.  AVS printed and provided to patient today. See Follow-Up section for recommended follow-up.    Alley Younger RN/PAULINA  [place] Diabetes Educator    Time Spent: 30 minutes  Encounter Type: Individual    Any diabetes medication dose changes were made via the CDE Protocol and Collaborative Practice Agreement with the patient's primary care provider. A copy of this encounter was shared with the provider.

## 2021-03-22 ENCOUNTER — OFFICE VISIT (OUTPATIENT)
Dept: ORTHOPEDICS | Facility: CLINIC | Age: 66
End: 2021-03-22
Payer: OTHER MISCELLANEOUS

## 2021-03-22 VITALS
RESPIRATION RATE: 14 BRPM | HEART RATE: 109 BPM | WEIGHT: 218 LBS | BODY MASS INDEX: 29.53 KG/M2 | HEIGHT: 72 IN | SYSTOLIC BLOOD PRESSURE: 124 MMHG | DIASTOLIC BLOOD PRESSURE: 92 MMHG

## 2021-03-22 DIAGNOSIS — M17.0 PRIMARY OSTEOARTHRITIS OF BOTH KNEES: Primary | ICD-10-CM

## 2021-03-22 PROCEDURE — 20610 DRAIN/INJ JOINT/BURSA W/O US: CPT | Mod: 50 | Performed by: ORTHOPAEDIC SURGERY

## 2021-03-22 RX ORDER — METHYLPREDNISOLONE ACETATE 80 MG/ML
160 INJECTION, SUSPENSION INTRA-ARTICULAR; INTRALESIONAL; INTRAMUSCULAR; SOFT TISSUE
Status: DISCONTINUED | OUTPATIENT
Start: 2021-03-22 | End: 2021-05-06

## 2021-03-22 RX ORDER — LIDOCAINE HYDROCHLORIDE 10 MG/ML
5 INJECTION, SOLUTION INFILTRATION; PERINEURAL
Status: DISCONTINUED | OUTPATIENT
Start: 2021-03-22 | End: 2021-05-06

## 2021-03-22 RX ORDER — METHYLPREDNISOLONE ACETATE 80 MG/ML
80 INJECTION, SUSPENSION INTRA-ARTICULAR; INTRALESIONAL; INTRAMUSCULAR; SOFT TISSUE
Status: DISCONTINUED | OUTPATIENT
Start: 2021-03-22 | End: 2021-05-06

## 2021-03-22 RX ADMIN — LIDOCAINE HYDROCHLORIDE 5 ML: 10 INJECTION, SOLUTION INFILTRATION; PERINEURAL at 14:52

## 2021-03-22 RX ADMIN — METHYLPREDNISOLONE ACETATE 80 MG: 80 INJECTION, SUSPENSION INTRA-ARTICULAR; INTRALESIONAL; INTRAMUSCULAR; SOFT TISSUE at 14:52

## 2021-03-22 RX ADMIN — METHYLPREDNISOLONE ACETATE 160 MG: 80 INJECTION, SUSPENSION INTRA-ARTICULAR; INTRALESIONAL; INTRAMUSCULAR; SOFT TISSUE at 14:52

## 2021-03-22 ASSESSMENT — MIFFLIN-ST. JEOR: SCORE: 1821.84

## 2021-03-22 NOTE — PROGRESS NOTES
Large Joint Injection/Arthocentesis: bilateral knee    Date/Time: 3/22/2021 2:52 PM  Performed by: Josh Dimas MD  Authorized by: Josh Dimas MD     Indications:  Pain and osteoarthritis  Needle Size:  22 G  Guidance: landmark guided    Location:  Knee  Laterality:  Bilateral      Medications (Right):  80 mg methylPREDNISolone 80 MG/ML; 5 mL lidocaine 1 %  Medications (Left):  160 mg methylPREDNISolone 80 MG/ML; 5 mL lidocaine 1 %  Outcome:  Tolerated well, no immediate complications  Procedure discussed: discussed risks, benefits, and alternatives    Consent Given by:  Patient  Timeout: timeout called immediately prior to procedure    Prep: patient was prepped and draped in usual sterile fashion

## 2021-03-22 NOTE — PROGRESS NOTES
Follow up bilateral knee primary osteoarthritis.  Prior Synvisc caused joint irritation.  Does better with steroid.  Likes double dose for left knee.  Last injection 9/14/20  Range of motion 0-125 degrees .    He  desires injection today of bilateral knee(s).  Risks, benefits, potential complications and alternatives were discussed.   With the patient's consent, sterile prep was performed of bilateral knee(s).  Left knee was injected with  Depo Medrol 160 mg and lidocaine at anteromedial site.  Right knee was injected with  Depo Medrol 80 mg and lidocaine at anteromedial site.  He also wanted a supportive brace for left knee.  A double upright hinged brace was provided.  Return to clinic as needed.    When he desires further surgical treatment, we should obtain new knee xrays.

## 2021-03-22 NOTE — LETTER
3/22/2021         RE: Enrico Leos  [address] [place] [address]        Dear Colleague,    Thank you for referring your patient, Enrico Leos, to the [facility]. Please see a copy of my visit note below.    Large Joint Injection/Arthocentesis: bilateral knee    Date/Time: 3/22/2021 2:52 PM  Performed by: Jsoh Dimas MD  Authorized by: Josh Dimas MD     Indications:  Pain and osteoarthritis  Needle Size:  22 G  Guidance: landmark guided    Location:  Knee  Laterality:  Bilateral      Medications (Right):  80 mg methylPREDNISolone 80 MG/ML; 5 mL lidocaine 1 %  Medications (Left):  160 mg methylPREDNISolone 80 MG/ML; 5 mL lidocaine 1 %  Outcome:  Tolerated well, no immediate complications  Procedure discussed: discussed risks, benefits, and alternatives    Consent Given by:  Patient  Timeout: timeout called immediately prior to procedure    Prep: patient was prepped and draped in usual sterile fashion            Follow up bilateral knee primary osteoarthritis.  Prior Synvisc caused joint irritation.  Does better with steroid.  Likes double dose for left knee.  Last injection 9/14/20  Range of motion 0-125 degrees .    He  desires injection today of bilateral knee(s).  Risks, benefits, potential complications and alternatives were discussed.   With the patient's consent, sterile prep was performed of bilateral knee(s).  Left knee was injected with  Depo Medrol 160 mg and lidocaine at anteromedial site.  Right knee was injected with  Depo Medrol 80 mg and lidocaine at anteromedial site.  He also wanted a supportive brace for left knee.  A double upright hinged brace was provided.  Return to clinic as needed.    When he desires further surgical treatment, we should obtain new knee xrays.             Again, thank you for allowing me to participate in the care of your patient.        Sincerely,        Josh Dimas MD

## 2021-04-06 ENCOUNTER — TRANSFERRED RECORDS (OUTPATIENT)
Dept: HEALTH INFORMATION MANAGEMENT | Facility: CLINIC | Age: 66
End: 2021-04-06

## 2021-04-10 DIAGNOSIS — E11.9 TYPE 2 DIABETES MELLITUS TREATED WITHOUT INSULIN (H): ICD-10-CM

## 2021-04-12 RX ORDER — METFORMIN HCL 500 MG
TABLET, EXTENDED RELEASE 24 HR ORAL
Qty: 180 TABLET | Refills: 0 | Status: SHIPPED | OUTPATIENT
Start: 2021-04-12 | End: 2021-06-10

## 2021-05-04 NOTE — PATIENT INSTRUCTIONS

## 2021-05-05 ENCOUNTER — OFFICE VISIT (OUTPATIENT)
Dept: FAMILY MEDICINE | Facility: CLINIC | Age: 66
End: 2021-05-05
Payer: MEDICARE

## 2021-05-05 VITALS
WEIGHT: 219 LBS | HEART RATE: 99 BPM | DIASTOLIC BLOOD PRESSURE: 87 MMHG | HEIGHT: 72 IN | SYSTOLIC BLOOD PRESSURE: 119 MMHG | OXYGEN SATURATION: 96 % | BODY MASS INDEX: 29.66 KG/M2 | RESPIRATION RATE: 18 BRPM | TEMPERATURE: 98.5 F

## 2021-05-05 DIAGNOSIS — H26.9 CATARACT OF LEFT EYE, UNSPECIFIED CATARACT TYPE: ICD-10-CM

## 2021-05-05 DIAGNOSIS — E11.9 TYPE 2 DIABETES MELLITUS TREATED WITHOUT INSULIN (H): ICD-10-CM

## 2021-05-05 DIAGNOSIS — I10 HYPERTENSION GOAL BP (BLOOD PRESSURE) < 140/90: ICD-10-CM

## 2021-05-05 DIAGNOSIS — Z01.818 PREOP GENERAL PHYSICAL EXAM: Primary | ICD-10-CM

## 2021-05-05 LAB
ANION GAP SERPL CALCULATED.3IONS-SCNC: 4 MMOL/L (ref 3–14)
BUN SERPL-MCNC: 13 MG/DL (ref 7–30)
CALCIUM SERPL-MCNC: 8.9 MG/DL (ref 8.5–10.1)
CHLORIDE SERPL-SCNC: 105 MMOL/L (ref 94–109)
CO2 SERPL-SCNC: 29 MMOL/L (ref 20–32)
CREAT SERPL-MCNC: 0.99 MG/DL (ref 0.66–1.25)
GFR SERPL CREATININE-BSD FRML MDRD: 81 ML/MIN/{1.73_M2}
GLUCOSE SERPL-MCNC: 157 MG/DL (ref 70–99)
HBA1C MFR BLD: 7 % (ref 0–5.6)
POTASSIUM SERPL-SCNC: 4.1 MMOL/L (ref 3.4–5.3)
SODIUM SERPL-SCNC: 138 MMOL/L (ref 133–144)

## 2021-05-05 PROCEDURE — 99214 OFFICE O/P EST MOD 30 MIN: CPT | Performed by: PHYSICIAN ASSISTANT

## 2021-05-05 PROCEDURE — 83036 HEMOGLOBIN GLYCOSYLATED A1C: CPT | Performed by: PHYSICIAN ASSISTANT

## 2021-05-05 PROCEDURE — 80048 BASIC METABOLIC PNL TOTAL CA: CPT | Performed by: PHYSICIAN ASSISTANT

## 2021-05-05 PROCEDURE — 36415 COLL VENOUS BLD VENIPUNCTURE: CPT | Performed by: PHYSICIAN ASSISTANT

## 2021-05-05 ASSESSMENT — MIFFLIN-ST. JEOR: SCORE: 1826.38

## 2021-05-05 NOTE — RESULT ENCOUNTER NOTE
Mr. Leos,    All of your labs were normal/near normal for you and your diabetes is better controlled.     Please contact the clinic if you have additional questions.  Thank you.    Sincerely,    Marlo Oliveros PA-C

## 2021-05-27 ENCOUNTER — TELEPHONE (OUTPATIENT)
Dept: FAMILY MEDICINE | Facility: CLINIC | Age: 66
End: 2021-05-27

## 2021-05-27 DIAGNOSIS — M17.0 PRIMARY OSTEOARTHRITIS OF BOTH KNEES: Primary | ICD-10-CM

## 2021-06-01 ENCOUNTER — TRANSFERRED RECORDS (OUTPATIENT)
Dept: HEALTH INFORMATION MANAGEMENT | Facility: CLINIC | Age: 66
End: 2021-06-01

## 2021-06-10 DIAGNOSIS — E11.9 TYPE 2 DIABETES MELLITUS TREATED WITHOUT INSULIN (H): ICD-10-CM

## 2021-06-10 RX ORDER — METFORMIN HCL 500 MG
TABLET, EXTENDED RELEASE 24 HR ORAL
Qty: 180 TABLET | Refills: 0 | Status: SHIPPED | OUTPATIENT
Start: 2021-06-10 | End: 2023-08-28

## 2021-06-23 ENCOUNTER — TELEPHONE (OUTPATIENT)
Dept: FAMILY MEDICINE | Facility: CLINIC | Age: 66
End: 2021-06-23

## 2021-06-23 DIAGNOSIS — E11.9 TYPE 2 DIABETES MELLITUS TREATED WITHOUT INSULIN (H): Primary | ICD-10-CM
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
## 2021-08-17 DIAGNOSIS — E78.2 MIXED HYPERLIPIDEMIA: Primary | ICD-10-CM

## 2021-08-17 DIAGNOSIS — E11.9 TYPE 2 DIABETES MELLITUS TREATED WITHOUT INSULIN (H): ICD-10-CM

## 2021-08-17 DIAGNOSIS — I10 HYPERTENSION GOAL BP (BLOOD PRESSURE) < 140/90: ICD-10-CM

## 2021-08-28 ENCOUNTER — HEALTH MAINTENANCE LETTER (OUTPATIENT)
Age: 66
End: 2021-08-28

## 2021-09-01 ENCOUNTER — LAB (OUTPATIENT)
Dept: LAB | Facility: CLINIC | Age: 66
End: 2021-09-01
Payer: MEDICARE
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
## 2021-09-01 DIAGNOSIS — E11.9 TYPE 2 DIABETES MELLITUS TREATED WITHOUT INSULIN (H): ICD-10-CM

## 2021-09-01 DIAGNOSIS — E78.2 MIXED HYPERLIPIDEMIA: ICD-10-CM

## 2021-09-01 DIAGNOSIS — I10 HYPERTENSION GOAL BP (BLOOD PRESSURE) < 140/90: ICD-10-CM

## 2021-09-01 LAB
ALBUMIN SERPL-MCNC: 3.8 G/DL (ref 3.4–5)
ALP SERPL-CCNC: 63 U/L (ref 40–150)
ALT SERPL W P-5'-P-CCNC: 57 U/L (ref 0–70)
ANION GAP SERPL CALCULATED.3IONS-SCNC: 5 MMOL/L (ref 3–14)
AST SERPL W P-5'-P-CCNC: 17 U/L (ref 0–45)
BILIRUB SERPL-MCNC: 0.5 MG/DL (ref 0.2–1.3)
BUN SERPL-MCNC: 15 MG/DL (ref 7–30)
CALCIUM SERPL-MCNC: 9.1 MG/DL (ref 8.5–10.1)
CHLORIDE BLD-SCNC: 108 MMOL/L (ref 94–109)
CHOLEST SERPL-MCNC: 150 MG/DL
CO2 SERPL-SCNC: 26 MMOL/L (ref 20–32)
CREAT SERPL-MCNC: 0.97 MG/DL (ref 0.66–1.25)
FASTING STATUS PATIENT QL REPORTED: YES
GFR SERPL CREATININE-BSD FRML MDRD: 83 ML/MIN/1.73M2
GLUCOSE BLD-MCNC: 108 MG/DL (ref 70–99)
HBA1C MFR BLD: 6.3 % (ref 0–5.6)
HDLC SERPL-MCNC: 43 MG/DL
LDLC SERPL CALC-MCNC: 75 MG/DL
NONHDLC SERPL-MCNC: 107 MG/DL
POTASSIUM BLD-SCNC: 4.4 MMOL/L (ref 3.4–5.3)
PROT SERPL-MCNC: 7 G/DL (ref 6.8–8.8)
SODIUM SERPL-SCNC: 139 MMOL/L (ref 133–144)
TRIGL SERPL-MCNC: 159 MG/DL

## 2021-09-01 PROCEDURE — 86618 LYME DISEASE ANTIBODY: CPT

## 2021-09-01 PROCEDURE — 83036 HEMOGLOBIN GLYCOSYLATED A1C: CPT

## 2021-09-01 PROCEDURE — 80053 COMPREHEN METABOLIC PANEL: CPT

## 2021-09-01 PROCEDURE — 36415 COLL VENOUS BLD VENIPUNCTURE: CPT

## 2021-09-01 PROCEDURE — 80061 LIPID PANEL: CPT

## 2021-09-02 DIAGNOSIS — I10 ESSENTIAL HYPERTENSION: ICD-10-CM

## 2021-09-02 DIAGNOSIS — I10 ESSENTIAL HYPERTENSION WITH GOAL BLOOD PRESSURE LESS THAN 140/90: ICD-10-CM

## 2021-09-02 LAB — B BURGDOR IGG+IGM SER QL: 0.46

## 2021-09-02 RX ORDER — METOPROLOL SUCCINATE 50 MG/1
TABLET, EXTENDED RELEASE ORAL
Qty: 90 TABLET | Refills: 1 | Status: SHIPPED | OUTPATIENT
Start: 2021-09-02 | End: 2023-08-28

## 2021-09-02 RX ORDER — LOSARTAN POTASSIUM 100 MG/1
TABLET ORAL
Qty: 90 TABLET | Refills: 1 | Status: SHIPPED | OUTPATIENT
Start: 2021-09-02 | End: 2022-01-12

## 2021-09-18 DIAGNOSIS — M17.0 PRIMARY OSTEOARTHRITIS OF BOTH KNEES: ICD-10-CM

## 2021-10-14 DIAGNOSIS — E11.9 TYPE 2 DIABETES MELLITUS TREATED WITHOUT INSULIN (H): ICD-10-CM

## 2021-10-14 RX ORDER — SEMAGLUTIDE 1.34 MG/ML
INJECTION, SOLUTION SUBCUTANEOUS
Qty: 4.5 ML | Refills: 3 | Status: SHIPPED | OUTPATIENT
Start: 2021-10-14 | End: 2022-01-12

## 2021-10-23 ENCOUNTER — HEALTH MAINTENANCE LETTER (OUTPATIENT)
Age: 66
End: 2021-10-23

## 2021-12-18 ENCOUNTER — HEALTH MAINTENANCE LETTER (OUTPATIENT)
Age: 66
End: 2021-12-18

## 2021-12-31 DIAGNOSIS — J32.9 SINUSITIS, UNSPECIFIED CHRONICITY, UNSPECIFIED LOCATION: Primary | ICD-10-CM

## 2021-12-31 RX ORDER — AZITHROMYCIN 250 MG/1
TABLET, FILM COATED ORAL
Qty: 6 TABLET | Refills: 0 | Status: SHIPPED | OUTPATIENT
Start: 2021-12-31 | End: 2022-01-15

## 2022-01-12 DIAGNOSIS — K21.00 GASTROESOPHAGEAL REFLUX DISEASE WITH ESOPHAGITIS: ICD-10-CM

## 2022-01-12 DIAGNOSIS — I10 ESSENTIAL HYPERTENSION WITH GOAL BLOOD PRESSURE LESS THAN 140/90: ICD-10-CM

## 2022-01-12 RX ORDER — LOSARTAN POTASSIUM 100 MG/1
TABLET ORAL
Qty: 90 TABLET | Refills: 1 | Status: SHIPPED | OUTPATIENT
Start: 2022-01-12 | End: 2022-05-09

## 2022-01-12 RX ORDER — OMEPRAZOLE 40 MG/1
CAPSULE, DELAYED RELEASE ORAL
Qty: 90 CAPSULE | Refills: 3 | Status: SHIPPED | OUTPATIENT
Start: 2022-01-12 | End: 2023-01-13

## 2022-01-15 DIAGNOSIS — J32.9 SINUSITIS, UNSPECIFIED CHRONICITY, UNSPECIFIED LOCATION: ICD-10-CM

## 2022-01-15 DIAGNOSIS — E78.2 MIXED HYPERLIPIDEMIA: ICD-10-CM

## 2022-01-15 RX ORDER — ATORVASTATIN CALCIUM 40 MG/1
TABLET, FILM COATED ORAL
Qty: 90 TABLET | Refills: 4 | Status: SHIPPED | OUTPATIENT
Start: 2022-01-15 | End: 2022-06-08

## 2022-01-15 RX ORDER — AZITHROMYCIN 250 MG/1
TABLET, FILM COATED ORAL
Qty: 6 TABLET | Refills: 0 | Status: SHIPPED | OUTPATIENT
Start: 2022-01-15 | End: 2023-08-28

## 2022-02-17 PROBLEM — Z71.89 ADVANCED DIRECTIVES, COUNSELING/DISCUSSION: Status: ACTIVE | Noted: 2017-09-28

## 2022-03-16 DIAGNOSIS — E11.9 TYPE 2 DIABETES MELLITUS TREATED WITHOUT INSULIN (H): ICD-10-CM

## 2022-03-16 RX ORDER — BLOOD SUGAR DIAGNOSTIC
STRIP MISCELLANEOUS
Qty: 100 STRIP | Refills: 23 | Status: SHIPPED | OUTPATIENT
Start: 2022-03-16 | End: 2023-03-22

## 2022-04-09 ENCOUNTER — HEALTH MAINTENANCE LETTER (OUTPATIENT)
Age: 67
End: 2022-04-09

## 2022-04-25 DIAGNOSIS — J32.9 SINUSITIS, UNSPECIFIED CHRONICITY, UNSPECIFIED LOCATION: ICD-10-CM

## 2022-04-25 RX ORDER — AZITHROMYCIN 250 MG/1
TABLET, FILM COATED ORAL
Qty: 6 TABLET | Refills: 0 | Status: SHIPPED | OUTPATIENT
Start: 2022-04-25 | End: 2023-08-28

## 2022-05-09 DIAGNOSIS — I10 ESSENTIAL HYPERTENSION WITH GOAL BLOOD PRESSURE LESS THAN 140/90: ICD-10-CM

## 2022-05-09 DIAGNOSIS — J32.9 SINUSITIS, UNSPECIFIED CHRONICITY, UNSPECIFIED LOCATION: Primary | ICD-10-CM

## 2022-05-09 DIAGNOSIS — E11.9 TYPE 2 DIABETES MELLITUS TREATED WITHOUT INSULIN (H): ICD-10-CM

## 2022-05-09 DIAGNOSIS — E78.2 MIXED HYPERLIPIDEMIA: ICD-10-CM

## 2022-05-09 DIAGNOSIS — Z12.5 SCREENING FOR PROSTATE CANCER: ICD-10-CM

## 2022-05-09 RX ORDER — LOSARTAN POTASSIUM 100 MG/1
TABLET ORAL
Qty: 90 TABLET | Refills: 1 | Status: SHIPPED | OUTPATIENT
Start: 2022-05-09 | End: 2022-11-03

## 2022-06-03 ENCOUNTER — LAB (OUTPATIENT)
Dept: LAB | Facility: CLINIC | Age: 67
End: 2022-06-03
Payer: MEDICARE

## 2022-06-03 DIAGNOSIS — E78.2 MIXED HYPERLIPIDEMIA: ICD-10-CM

## 2022-06-03 DIAGNOSIS — Z12.5 SCREENING FOR PROSTATE CANCER: ICD-10-CM

## 2022-06-03 DIAGNOSIS — E11.9 TYPE 2 DIABETES MELLITUS TREATED WITHOUT INSULIN (H): ICD-10-CM

## 2022-06-03 DIAGNOSIS — I10 ESSENTIAL HYPERTENSION WITH GOAL BLOOD PRESSURE LESS THAN 140/90: ICD-10-CM

## 2022-06-03 LAB
ALBUMIN SERPL-MCNC: 3.7 G/DL (ref 3.4–5)
ALP SERPL-CCNC: 69 U/L (ref 40–150)
ALT SERPL W P-5'-P-CCNC: 48 U/L (ref 0–70)
ANION GAP SERPL CALCULATED.3IONS-SCNC: 5 MMOL/L (ref 3–14)
AST SERPL W P-5'-P-CCNC: 19 U/L (ref 0–45)
BILIRUB SERPL-MCNC: 0.6 MG/DL (ref 0.2–1.3)
BUN SERPL-MCNC: 19 MG/DL (ref 7–30)
CALCIUM SERPL-MCNC: 8.7 MG/DL (ref 8.5–10.1)
CHLORIDE BLD-SCNC: 106 MMOL/L (ref 94–109)
CHOLEST SERPL-MCNC: 149 MG/DL
CO2 SERPL-SCNC: 28 MMOL/L (ref 20–32)
CREAT SERPL-MCNC: 1.08 MG/DL (ref 0.66–1.25)
CREAT UR-MCNC: 377 MG/DL
FASTING STATUS PATIENT QL REPORTED: YES
GFR SERPL CREATININE-BSD FRML MDRD: 77 ML/MIN/1.73M2
GLUCOSE BLD-MCNC: 114 MG/DL (ref 70–99)
HBA1C MFR BLD: 6 % (ref 0–5.6)
HDLC SERPL-MCNC: 40 MG/DL
LDLC SERPL CALC-MCNC: 76 MG/DL
MICROALBUMIN UR-MCNC: 53 MG/L
MICROALBUMIN/CREAT UR: 14.06 MG/G CR (ref 0–17)
NONHDLC SERPL-MCNC: 109 MG/DL
POTASSIUM BLD-SCNC: 4.4 MMOL/L (ref 3.4–5.3)
PROT SERPL-MCNC: 7 G/DL (ref 6.8–8.8)
PSA SERPL-MCNC: 7.79 UG/L (ref 0–4)
SODIUM SERPL-SCNC: 139 MMOL/L (ref 133–144)
TRIGL SERPL-MCNC: 167 MG/DL

## 2022-06-03 PROCEDURE — 82043 UR ALBUMIN QUANTITATIVE: CPT

## 2022-06-03 PROCEDURE — 83036 HEMOGLOBIN GLYCOSYLATED A1C: CPT

## 2022-06-03 PROCEDURE — 80061 LIPID PANEL: CPT

## 2022-06-03 PROCEDURE — 80053 COMPREHEN METABOLIC PANEL: CPT

## 2022-06-03 PROCEDURE — 36415 COLL VENOUS BLD VENIPUNCTURE: CPT

## 2022-06-03 PROCEDURE — G0103 PSA SCREENING: HCPCS

## 2022-06-06 NOTE — PATIENT INSTRUCTIONS
Patient Education   Personalized Prevention Plan  You are due for the preventive services outlined below.  Your care team is available to assist you in scheduling these services.  If you have already completed any of these items, please share that information with your care team to update in your medical record.  Health Maintenance Due   Topic Date Due    Diabetic Foot Exam  Never done    ANNUAL REVIEW OF HM ORDERS  Never done    COVID-19 Vaccine (1) Never done    Pneumococcal Vaccine (1 - PCV) Never done    Zoster (Shingles) Vaccine (1 of 2) Never done    Eye Exam  11/16/2021    PHQ-2 (once per calendar year)  01/01/2022    Annual Wellness Visit  01/20/2022      Appointment with Dr. Orestes Mathews

## 2022-06-06 NOTE — PROGRESS NOTES
SUBJECTIVE:   CC: Enrico Leos is an 66 year old male who presents for preventative health visit.     {Split Bill scripting  The purpose of this visit is to discuss your medical history and prevent health problems before you are sick. You may be responsible for a co-pay, coinsurance, or deductible if your visit today includes services such as checking on a sore throat, having an x-ray or lab test, or treating and evaluating a new or existing condition :594768}  {Patient advised of split billing (Optional):260836}  HPI  {Add if <65 person on Medicare  - Required Questions (Optional):944600}  {Outside tests to abstract? :638296}    {additional problems to add (Optional):086377}    Today's PHQ-2 Score:   PHQ-2 ( 1999 Pfizer) 1/20/2021   Q1: Little interest or pleasure in doing things 0   Q2: Feeling down, depressed or hopeless 0   PHQ-2 Score 0   PHQ-2 Total Score (12-17 Years)- Positive if 3 or more points; Administer PHQ-A if positive 0   Q1: Little interest or pleasure in doing things -   Q2: Feeling down, depressed or hopeless -   PHQ-2 Score -       Abuse: Current or Past(Physical, Sexual or Emotional)- { :852594}  Do you feel safe in your environment? { :535730}        Social History     Tobacco Use     Smoking status: Never Smoker     Smokeless tobacco: Never Used   Substance Use Topics     Alcohol use: Yes     Comment: occasionally     {Rooming Staff- Complete this question if Prescreen response is not shown below for today's visit. If you drink alcohol do you typically have >3 drinks per day or >7 drinks per week? (Optional):441442}    Alcohol Use 11/20/2019   Prescreen: >3 drinks/day or >7 drinks/week? { AUDIT responses all:TXT,66063}   {add AUDIT responses (Optional) (A score of 7 for adult men is an indication of hazardous drinking; a score of 8 or more is an indication of an alcohol use disorder.  A score of 7 or more for adult women is an indication of hazardous drinking or an alchohol use  "disorder):829990}    Last PSA:   PSA   Date Value Ref Range Status   01/15/2021 6.91 (H) 0 - 4 ug/L Final     Comment:     Assay Method:  Chemiluminescence using Siemens Vista analyzer     Prostate Specific Antigen Screen   Date Value Ref Range Status   06/03/2022 7.79 (H) 0.00 - 4.00 ug/L Final       Reviewed orders with patient. Reviewed health maintenance and updated orders accordingly - { :674689::\"Yes\"}  {Chronicprobdata (optional):967421}    Reviewed and updated as needed this visit by clinical staff                    Reviewed and updated as needed this visit by Provider                   {HISTORY OPTIONS (Optional):621726}    Review of Systems  {MALE ROS (Optional):245017::\"CONSTITUTIONAL: NEGATIVE for fever, chills, change in weight\",\"INTEGUMENTARY/SKIN: NEGATIVE for worrisome rashes, moles or lesions\",\"EYES: NEGATIVE for vision changes or irritation\",\"ENT: NEGATIVE for ear, mouth and throat problems\",\"RESP: NEGATIVE for significant cough or SOB\",\"CV: NEGATIVE for chest pain, palpitations or peripheral edema\",\"GI: NEGATIVE for nausea, abdominal pain, heartburn, or change in bowel habits\",\" male: negative for dysuria, hematuria, decreased urinary stream, erectile dysfunction, urethral discharge\",\"MUSCULOSKELETAL: NEGATIVE for significant arthralgias or myalgia\",\"NEURO: NEGATIVE for weakness, dizziness or paresthesias\",\"PSYCHIATRIC: NEGATIVE for changes in mood or affect\"}    OBJECTIVE:   There were no vitals taken for this visit.    Physical Exam  {Exam Choices (Optional):012333}    {Diagnostic Test Results (Optional):742321::\"Diagnostic Test Results:\",\"Labs reviewed in Epic\"}    ASSESSMENT/PLAN:   {Diag Picklist:184464}    {Patient advised of split billing (Optional):706483}    COUNSELING:   {MALE COUNSELING MESSAGES:551826::\"Reviewed preventive health counseling, as reflected in patient instructions\"}    Estimated body mass index is 29.7 kg/m  as calculated from the following:    Height as of 5/5/21: " 1.829 m (6').    Weight as of 5/5/21: 99.3 kg (219 lb).     {Weight Management Plan (ACO) Complete if BMI is abnormal-  Ages 18-64  BMI >24.9.  Age 65+ with BMI <23 or >30 (Optional):648373}    He reports that he has never smoked. He has never used smokeless tobacco.      Counseling Resources:  ATP IV Guidelines  Pooled Cohorts Equation Calculator  FRAX Risk Assessment  ICSI Preventive Guidelines  Dietary Guidelines for Americans, 2010  USDA's MyPlate  ASA Prophylaxis  Lung CA Screening    Josh Carter MD  [facility]

## 2022-06-08 ENCOUNTER — OFFICE VISIT (OUTPATIENT)
Dept: FAMILY MEDICINE | Facility: CLINIC | Age: 67
End: 2022-06-08
Payer: MEDICARE

## 2022-06-08 VITALS
RESPIRATION RATE: 16 BRPM | TEMPERATURE: 97.3 F | HEART RATE: 90 BPM | WEIGHT: 215 LBS | DIASTOLIC BLOOD PRESSURE: 88 MMHG | HEIGHT: 72 IN | OXYGEN SATURATION: 97 % | BODY MASS INDEX: 29.12 KG/M2 | SYSTOLIC BLOOD PRESSURE: 130 MMHG

## 2022-06-08 DIAGNOSIS — E78.2 MIXED HYPERLIPIDEMIA: ICD-10-CM

## 2022-06-08 DIAGNOSIS — E11.9 TYPE 2 DIABETES MELLITUS TREATED WITHOUT INSULIN (H): ICD-10-CM

## 2022-06-08 DIAGNOSIS — Z00.00 ENCOUNTER FOR MEDICARE ANNUAL WELLNESS EXAM: Primary | ICD-10-CM

## 2022-06-08 DIAGNOSIS — R97.20 ELEVATED PROSTATE SPECIFIC ANTIGEN (PSA): ICD-10-CM

## 2022-06-08 PROCEDURE — G0438 PPPS, INITIAL VISIT: HCPCS | Performed by: FAMILY MEDICINE

## 2022-06-08 PROCEDURE — 99213 OFFICE O/P EST LOW 20 MIN: CPT | Mod: 25 | Performed by: FAMILY MEDICINE

## 2022-06-08 RX ORDER — SEMAGLUTIDE 1.34 MG/ML
INJECTION, SOLUTION SUBCUTANEOUS
COMMUNITY
Start: 2022-05-09 | End: 2022-07-05

## 2022-06-08 RX ORDER — ATORVASTATIN CALCIUM 40 MG/1
20 TABLET, FILM COATED ORAL DAILY
Qty: 90 TABLET | Refills: 4 | COMMUNITY
Start: 2022-06-08 | End: 2023-04-17

## 2022-06-08 ASSESSMENT — ENCOUNTER SYMPTOMS
NAUSEA: 1
HEMATURIA: 0
MYALGIAS: 1
FREQUENCY: 0
JOINT SWELLING: 1
DIARRHEA: 0
HEARTBURN: 0
HEADACHES: 1
PARESTHESIAS: 0
WEAKNESS: 0
PALPITATIONS: 0
CONSTIPATION: 1
CHILLS: 0
DYSURIA: 0
ARTHRALGIAS: 1
NERVOUS/ANXIOUS: 0
FEVER: 0
SHORTNESS OF BREATH: 0
HEMATOCHEZIA: 0
DIZZINESS: 1
ABDOMINAL PAIN: 0
EYE PAIN: 0
COUGH: 1
SORE THROAT: 0

## 2022-06-08 ASSESSMENT — ACTIVITIES OF DAILY LIVING (ADL): CURRENT_FUNCTION: NO ASSISTANCE NEEDED

## 2022-06-08 ASSESSMENT — PAIN SCALES - GENERAL: PAINLEVEL: SEVERE PAIN (6)

## 2022-06-08 NOTE — PROGRESS NOTES
"SUBJECTIVE:   Enrico Leos is a 66 year old male who presents for Preventive Visit.      Patient has been advised of split billing requirements and indicates understanding: Yes  Are you in the first 12 months of your Medicare coverage?  No    Healthy Habits:     In general, how would you rate your overall health?  Good    Frequency of exercise:  2-3 days/week    Duration of exercise:  15-30 minutes    Do you usually eat at least 4 servings of fruit and vegetables a day, include whole grains    & fiber and avoid regularly eating high fat or \"junk\" foods?  Yes    Taking medications regularly:  Yes    Medication side effects:  Muscle aches and Other    Ability to successfully perform activities of daily living:  No assistance needed    Home Safety:  No safety concerns identified    Hearing Impairment:  Difficulty understanding soft or whispered speech    In the past 6 months, have you been bothered by leaking of urine?  No    In general, how would you rate your overall mental or emotional health?  Good      PHQ-2 Total Score: 0    Additional concerns today:  No    Do you feel safe in your environment? Yes    Have you ever done Advance Care Planning? (For example, a Health Directive, POLST, or a discussion with a medical provider or your loved ones about your wishes): No, advance care planning information given to patient to review.  Patient plans to discuss their wishes with loved ones or provider.         Fall risk       Cognitive Screening   1) Repeat 3 items (Leader, Season, Table)    2) Clock draw: ABNORMAL    3) 3 item recall: Recalls 2 objects   Results: ABNORMAL clock, 1-2 items recalled: PROBABLE COGNITIVE IMPAIRMENT, **INFORM PROVIDER**    Mini-CogTM Copyright ARY Mccullough. Licensed by the author for use in [facility]; reprinted with permission (kita@.[email or web address]). All rights reserved.      Do you have sleep apnea, excessive snoring or daytime drowsiness?: no    Reviewed and updated as needed this visit " by clinical staff   Tobacco  Allergies  Meds   Med Hx  Surg Hx  Fam Hx  Soc Hx          Reviewed and updated as needed this visit by Provider                   Social History     Tobacco Use     Smoking status: Never Smoker     Smokeless tobacco: Never Used   Substance Use Topics     Alcohol use: Yes     Comment: occasionally     If you drink alcohol do you typically have >3 drinks per day or >7 drinks per week? No                  Current providers sharing in care for this patient include:    Patient Care Team:  Josh Carter MD as PCP - General (Family Practice)  Josh Carter MD as Assigned PCP  Dakota Coffman MD as MD (Urology)  Kathia Vegas, RN as Registered Nurse (Oncology)  Josh Dimas MD as Assigned Musculoskeletal Provider  Belinda Younger, ARIELLE as Diabetes Educator (Diabetes Education)    The following health maintenance items are reviewed in Epic and correct as of today:  Health Maintenance Due   Topic Date Due     DIABETIC FOOT EXAM  Never done     ANNUAL REVIEW OF HM ORDERS  Never done     COVID-19 Vaccine (1) Never done     Pneumococcal Vaccine: Pediatrics (0 to 5 Years) and At-Risk Patients (6 to 64 Years) (1 - PCV) Never done     ZOSTER IMMUNIZATION (1 of 2) Never done     EYE EXAM  11/16/2021     PHQ-2 (once per calendar year)  01/01/2022     MEDICARE ANNUAL WELLNESS VISIT  01/20/2022                 Review of Systems   Constitutional: Negative for chills and fever.   HENT: Positive for congestion and hearing loss. Negative for ear pain and sore throat.    Eyes: Negative for pain and visual disturbance.   Respiratory: Positive for cough. Negative for shortness of breath.    Cardiovascular: Negative for chest pain, palpitations and peripheral edema.   Gastrointestinal: Positive for constipation and nausea. Negative for abdominal pain, diarrhea, heartburn and hematochezia.   Genitourinary: Negative for dysuria, frequency, genital sores, hematuria,  "impotence, penile discharge and urgency.   Musculoskeletal: Positive for arthralgias, joint swelling and myalgias.   Skin: Negative for rash.   Neurological: Positive for dizziness and headaches. Negative for weakness and paresthesias.   Psychiatric/Behavioral: Negative for mood changes. The patient is not nervous/anxious.           OBJECTIVE:   /88   Pulse 90   Temp 97.3  F (36.3  C) (Tympanic)   Resp 16   Ht 1.835 m (6' 0.25\")   Wt 97.5 kg (215 lb)   SpO2 97%   BMI 28.96 kg/m   Estimated body mass index is 28.96 kg/m  as calculated from the following:    Height as of this encounter: 1.835 m (6' 0.25\").    Weight as of this encounter: 97.5 kg (215 lb).  Physical Exam  GENERAL: healthy, alert and no distress  EYES: Eyes grossly normal to inspection, PERRL and conjunctivae and sclerae normal  HENT: ear canals and TM's normal, nose and mouth without ulcers or lesions  NECK: no adenopathy, no asymmetry, masses, or scars and thyroid normal to palpation  RESP: lungs clear to auscultation - no rales, rhonchi or wheezes  CV: regular rate and rhythm, normal S1 S2, no S3 or S4, no murmur, click or rub, no peripheral edema and peripheral pulses strong  ABDOMEN: soft, nontender, no hepatosplenomegaly, no masses and bowel sounds normal  MS: no gross musculoskeletal defects noted, no edema  SKIN: no suspicious lesions or rashes  NEURO: Normal strength and tone, mentation intact and speech normal  PSYCH: mentation appears normal, affect normal/bright    Diagnostic Test Results:  Labs reviewed in Epic    ASSESSMENT / PLAN:       ICD-10-CM    1. Encounter for Medicare annual wellness exam  Z00.00    2. Type 2 diabetes mellitus treated without insulin (H)  E11.9 OPTOMETRY REFERRAL   3. Elevated prostate specific antigen (PSA)  R97.20 Adult Urology Referral       Patient has been advised of split billing requirements and indicates understanding: No    COUNSELING:  Reviewed preventive health counseling, as reflected in " "patient instructions       Regular exercise       Healthy diet/nutrition    Estimated body mass index is 28.96 kg/m  as calculated from the following:    Height as of this encounter: 1.835 m (6' 0.25\").    Weight as of this encounter: 97.5 kg (215 lb).    Weight management plan: less calories    He reports that he has never smoked. He has never used smokeless tobacco.      Appropriate preventive services were discussed with this patient, including applicable screening as appropriate for cardiovascular disease, diabetes, osteopenia/osteoporosis, and glaucoma.  As appropriate for age/gender, discussed screening for colorectal cancer, prostate cancer, breast cancer, and cervical cancer. Checklist reviewing preventive services available has been given to the patient.    Reviewed patients plan of care and provided an AVS. The Basic Care Plan (routine screening as documented in Health Maintenance) for Enrico meets the Care Plan requirement. This Care Plan has been established and reviewed with the Patient.    Counseling Resources:  ATP IV Guidelines  Pooled Cohorts Equation Calculator  Breast Cancer Risk Calculator  Breast Cancer: Medication to Reduce Risk  FRAX Risk Assessment  ICSI Preventive Guidelines  Dietary Guidelines for Americans, 2010  USDA's MyPlate  ASA Prophylaxis  Lung CA Screening    Josh Carter MD  [facility]    Identified Health Risks:  "

## 2022-06-09 ENCOUNTER — MYC MEDICAL ADVICE (OUTPATIENT)
Dept: FAMILY MEDICINE | Facility: CLINIC | Age: 67
End: 2022-06-09

## 2022-06-09 DIAGNOSIS — E11.9 TYPE 2 DIABETES MELLITUS TREATED WITHOUT INSULIN (H): Primary | ICD-10-CM

## 2022-07-05 DIAGNOSIS — E11.9 TYPE 2 DIABETES MELLITUS TREATED WITHOUT INSULIN (H): ICD-10-CM

## 2022-07-05 RX ORDER — SEMAGLUTIDE 1.34 MG/ML
0.25 INJECTION, SOLUTION SUBCUTANEOUS
Qty: 4.5 ML | Refills: 1 | Status: SHIPPED | OUTPATIENT
Start: 2022-07-05 | End: 2022-07-06

## 2022-07-06 RX ORDER — SEMAGLUTIDE 1.34 MG/ML
0.25 INJECTION, SOLUTION SUBCUTANEOUS
Qty: 4.5 ML | Refills: 1 | Status: SHIPPED | OUTPATIENT
Start: 2022-07-06 | End: 2022-11-03

## 2022-07-06 NOTE — TELEPHONE ENCOUNTER
Requesting prior authorization. Please advise.     Dorothy Lunsford RN 07/06/22 11:54 AM   [facility] Triage Nurse Advisor

## 2022-07-12 NOTE — TELEPHONE ENCOUNTER
OZEMPIC, 0.25 OR 0.5 MG/DOSE, 2 MG/1.5ML SOPN pen has been sent to pharmacy, called [facility], they directed me to [facility], gave [facility] all info and RX is ready for . PA reference number is [id number]. Called patient and informed.    Camron Mejía

## 2022-08-09 DIAGNOSIS — M54.12 CERVICAL RADICULAR PAIN: ICD-10-CM

## 2022-08-09 DIAGNOSIS — R05.9 COUGH: ICD-10-CM

## 2022-08-09 RX ORDER — PREDNISONE 20 MG/1
20 TABLET ORAL 2 TIMES DAILY
Qty: 10 TABLET | Refills: 0 | Status: SHIPPED | OUTPATIENT
Start: 2022-08-09 | End: 2022-09-29

## 2022-09-22 DIAGNOSIS — J32.9 SINUSITIS, UNSPECIFIED CHRONICITY, UNSPECIFIED LOCATION: Primary | ICD-10-CM

## 2022-09-29 DIAGNOSIS — R05.9 COUGH: ICD-10-CM

## 2022-09-29 DIAGNOSIS — J32.9 SINUSITIS, UNSPECIFIED CHRONICITY, UNSPECIFIED LOCATION: Primary | ICD-10-CM

## 2022-09-29 RX ORDER — PREDNISONE 20 MG/1
20 TABLET ORAL 2 TIMES DAILY
Qty: 10 TABLET | Refills: 0 | Status: SHIPPED | OUTPATIENT
Start: 2022-09-29 | End: 2023-08-28

## 2022-10-05 ENCOUNTER — ANCILLARY PROCEDURE (OUTPATIENT)
Dept: GENERAL RADIOLOGY | Facility: CLINIC | Age: 67
End: 2022-10-05
Attending: FAMILY MEDICINE
Payer: COMMERCIAL

## 2022-10-05 ENCOUNTER — OFFICE VISIT (OUTPATIENT)
Dept: FAMILY MEDICINE | Facility: CLINIC | Age: 67
End: 2022-10-05
Payer: COMMERCIAL

## 2022-10-05 VITALS
WEIGHT: 217 LBS | TEMPERATURE: 97.2 F | RESPIRATION RATE: 18 BRPM | HEIGHT: 72 IN | HEART RATE: 105 BPM | SYSTOLIC BLOOD PRESSURE: 129 MMHG | OXYGEN SATURATION: 97 % | BODY MASS INDEX: 29.39 KG/M2 | DIASTOLIC BLOOD PRESSURE: 78 MMHG

## 2022-10-05 DIAGNOSIS — E11.9 TYPE 2 DIABETES MELLITUS TREATED WITHOUT INSULIN (H): ICD-10-CM

## 2022-10-05 DIAGNOSIS — R06.2 WHEEZING: ICD-10-CM

## 2022-10-05 DIAGNOSIS — R05.8 PRODUCTIVE COUGH: Primary | ICD-10-CM

## 2022-10-05 DIAGNOSIS — I10 HYPERTENSION GOAL BP (BLOOD PRESSURE) < 140/90: ICD-10-CM

## 2022-10-05 DIAGNOSIS — R05.8 PRODUCTIVE COUGH: ICD-10-CM

## 2022-10-05 PROCEDURE — 71046 X-RAY EXAM CHEST 2 VIEWS: CPT | Mod: TC | Performed by: RADIOLOGY

## 2022-10-05 PROCEDURE — 99213 OFFICE O/P EST LOW 20 MIN: CPT | Performed by: FAMILY MEDICINE

## 2022-10-05 RX ORDER — FLUTICASONE PROPIONATE AND SALMETEROL 100; 50 UG/1; UG/1
1 POWDER RESPIRATORY (INHALATION) EVERY 12 HOURS
Qty: 16 EACH | Refills: 3 | Status: SHIPPED | OUTPATIENT
Start: 2022-10-05 | End: 2023-03-09

## 2022-10-05 NOTE — PROGRESS NOTES
".  SUBJECTIVE:  67 year old.The patient has a complaint of productive cough.  This started up to 5 years ago.  Associated symptoms are wheezing. .  Brought on by possibly a sinus drainage. .  Better with antibiotics or steroids. ROS no fever or chills      Reviewed health maintenance  Patient Active Problem List   Diagnosis     Mixed hyperlipidemia     Lumbar radiculopathy     MEDIAL MENISCUS TEAR - left     OA (Osteoarthritis) of Knee - left     HYPERLIPIDEMIA LDL GOAL <130     Allergic rhinitis     Hyperlipidemia     Degenerative joint disease     Hypertension goal BP (blood pressure) < 140/90     Chronic fatigue     Joint ache     GERD (gastroesophageal reflux disease)     Chronic constipation     Advanced directives, counseling/discussion     Cervical radiculitis     Cervical radiculopathy     Post-traumatic osteoarthritis of left knee     Non-cardiac chest pain     Reflux esophagitis     Type 2 diabetes mellitus treated without insulin (H)     Past Medical History:   Diagnosis Date     Allergic rhinitis, cause unspecified     Allergic rhinitis     Chronic fatigue 9/9/2011     Degenerative joint disease      Diabetes mellitus, type 2 (H) 1/20/2021     History of spinal cord injury low back surgery     History of thrombophlebitis as a infant     Hypertension      Lumbago      Other and unspecified hyperlipidemia      Pure hypercholesterolemia        OBJECTIVE:  no apparent distress  /78   Pulse 105   Temp 97.2  F (36.2  C) (Tympanic)   Resp 18   Ht 1.835 m (6' 0.25\")   Wt 98.4 kg (217 lb)   SpO2 97%   BMI 29.23 kg/m      LUNGS:  CTA B/L, rare  wheezing   Cardiovascular: negative, PMI normal. No lifts, heaves, or thrills. RRR. No murmurs, clicks gallops or rub   Gastrointestinal: Abdomen soft, non-tender. BS normal. No masses, organomegaly       ICD-10-CM    1. Productive cough  R05.8 XR Chest 2 Views   2. Hypertension goal BP (blood pressure) < 140/90  I10    3. Type 2 diabetes mellitus treated " without insulin (H)  E11.9 Adult Eye  Referral     HEMOGLOBIN A1C    PLAN: follow up with ENT

## 2022-10-06 ENCOUNTER — TELEPHONE (OUTPATIENT)
Dept: FAMILY MEDICINE | Facility: CLINIC | Age: 67
End: 2022-10-06

## 2022-10-06 NOTE — TELEPHONE ENCOUNTER
Central Prior Authorization Team   Phone: [phone]      Prior Authorization Retail Medication Request    Medication/Dose: WIXELA 100-50 INHUB  ICD code (if different than what is on RX):  Wheezing [R06.2]   Previously Tried and Failed:    Rationale:      Insurance Name:  [employer]IC[employer] (PDP)  Insurance ID:  [id number]      Pharmacy Information (if different than what is on RX)  Name:  [id number] [place] [facility] - [place] - [address]  Phone:  [phone]

## 2022-10-06 NOTE — TELEPHONE ENCOUNTER
Central Prior Authorization Team   Phone: [phone]      PA Initiation    Medication: WIXELA 100-50 INHUB--INITIATED  Insurance Company: [employer] - Phone [phone] Fax [phone]  Ph[id number] [place] - [address]  Filling Pharmacy Phone: [phone]  Filling Pharmacy Fax:    Start Date: 10/6/2022

## 2022-10-09 ENCOUNTER — HEALTH MAINTENANCE LETTER (OUTPATIENT)
Age: 67
End: 2022-10-09

## 2022-10-10 NOTE — TELEPHONE ENCOUNTER
Prior Authorization Not Needed per Insurance    Medication: WIXELA 100-50 INHUB--NOT NEEDED - INS prefers BRAND  Insurance Company: [employer] - Phone [phone] Fax [phone]   Ph[id number] [place], [place] - [address]  Pharmacy Notified: Yes  Patient Notified: Yes  Notified pharmacy needs to fill brand from now on - INS allowed one time fill of generic Wixella -   They made a note of brand preference - PT will be notified by pharmacy

## 2022-10-30 NOTE — PROGRESS NOTES
Chief Complaint - sinusitis; hearing loss    History of Present Illness - Enrico Leos is a 67 year old male who returns for evaluation of chronic sinusitis. I saw him in 2018 for this. The patient describes symptoms of purulent drainage, lots of cough, nasal congestion, wheezing, plugged head. Smell seems okay. The patient notes some allergies. Treatments have included antibiotics, nasal steroids, nasal saline irrigations, and oral antihistamines. The treatments seem to only help. No prior history of sinus surgery. I personally reviewed the relevant clinical notes in Epic including the primary care providers note.     He also has sensorineural hearing loss for many years.  He states he had significant noise exposure through his job.  He does wear hearing aids but does not have them today.  He feels hearing is slightly and slowly worsening over the years.    Tests personally reviewed today for this visit:   1.) CT sinus 7/25/2018 showed chronic sinusitis  2.) Hgb A1C was 6.0 on 6/3/22    Past Medical History -   Patient Active Problem List   Diagnosis     Mixed hyperlipidemia     Lumbar radiculopathy     MEDIAL MENISCUS TEAR - left     OA (Osteoarthritis) of Knee - left     HYPERLIPIDEMIA LDL GOAL <130     Allergic rhinitis     Hyperlipidemia     Degenerative joint disease     Hypertension goal BP (blood pressure) < 140/90     Chronic fatigue     Joint ache     GERD (gastroesophageal reflux disease)     Chronic constipation     Advanced directives, counseling/discussion     Cervical radiculitis     Cervical radiculopathy     Post-traumatic osteoarthritis of left knee     Non-cardiac chest pain     Reflux esophagitis     Type 2 diabetes mellitus treated without insulin (H)       Current Medications -   Current Outpatient Medications:      amoxicillin-clavulanate (AUGMENTIN) 875-125 MG tablet, Take 1 tablet by mouth 2 times daily, Disp: 28 tablet, Rfl: 1     aspirin 81 MG tablet, Take 1 tablet by mouth daily.,  Disp: , Rfl:      atorvastatin (LIPITOR) 40 MG tablet, Take 0.5 tablets (20 mg) by mouth daily, Disp: 90 tablet, Rfl: 4     azithromycin (ZITHROMAX) 250 MG tablet, Two tablets first day, then one tablet daily for four days. (Patient not taking: No sig reported), Disp: 6 tablet, Rfl: 0     azithromycin (ZITHROMAX) 250 MG tablet, TAKE 2 TABLETS BY MOUTH TODAY, THEN TAKE 1 TABLET DAILY FOR 4 DAYS (Patient not taking: Reported on 10/5/2022), Disp: 6 tablet, Rfl: 0     diclofenac (VOLTAREN) 1 % topical gel, Apply 4 g topically 4 times daily (Patient not taking: No sig reported), Disp: 1 Tube, Rfl: 3     fluticasone-salmeterol (ADVAIR DISKUS) 100-50 MCG/ACT inhaler, Inhale 1 puff into the lungs every 12 hours, Disp: 16 each, Rfl: 3     losartan (COZAAR) 100 MG tablet, TAKE 1 TABLET BY MOUTH EVERY DAY, Disp: 90 tablet, Rfl: 1     metFORMIN (GLUCOPHAGE-XR) 500 MG 24 hr tablet, TAKE 1 TABLET BY MOUTH TWICE A DAY WITH MEALS (Patient not taking: No sig reported), Disp: 180 tablet, Rfl: 0     metoprolol succinate ER (TOPROL-XL) 50 MG 24 hr tablet, TAKE 1 TABLET BY MOUTH EVERY DAY (Patient not taking: No sig reported), Disp: 90 tablet, Rfl: 1     omeprazole (PRILOSEC) 40 MG DR capsule, TAKE 1 CAPSULE BY MOUTH EVERY DAY (Patient not taking: Reported on 10/5/2022), Disp: 90 capsule, Rfl: 3     OneTouch Delica Lancets 33G MISC, 1 Box 2 times daily, Disp: 100 each, Rfl: 11     ONETOUCH VERIO IQ test strip, USE TO TEST BLOOD SUGARS 2 TIMES DAILY OR AS DIRECTED., Disp: 100 strip, Rfl: 23     OZEMPIC, 0.25 OR 0.5 MG/DOSE, 2 MG/1.5ML SOPN pen, INJECT 0.25 MG SUBCUTANEOUS EVERY 7 DAYS, Disp: 4.5 mL, Rfl: 1     predniSONE (DELTASONE) 20 MG tablet, Take 1 tablet (20 mg) by mouth 2 times daily (Patient not taking: Reported on 10/5/2022), Disp: 10 tablet, Rfl: 0     sertraline (ZOLOFT) 50 MG tablet, TAKE 1 TABLET BY MOUTH EVERY DAY (Patient not taking: No sig reported), Disp: 90 tablet, Rfl: 1    Allergies -   Allergies   Allergen Reactions      No Known Drug Allergies        Social History -   Social History     Socioeconomic History     Marital status:      Spouse name: Sheridan     Number of children: 3   Occupational History     Occupation: [employer]     Employer: [employer]   Tobacco Use     Smoking status: Never     Smokeless tobacco: Never   Substance and Sexual Activity     Alcohol use: Yes     Comment: occasionally     Drug use: No     Sexual activity: Yes     Partners: Female       Family History -   Family History   Problem Relation Age of Onset     Cancer Mother         tumor on leg     Lipids Mother      Macular Degeneration Mother 70        shot tx , hx of smoking 40 yrs ago      Parkinsonism Father      Heart Disease Father         open heart by pass     Prostate Cancer Father      Cancer Maternal Grandmother         lung     Cerebrovascular Disease Paternal Grandmother      Glaucoma No family hx of        Review of Systems - As per HPI and PMHx, + hearing loss from noise exposure (he has hearing aids, isn't wearing them today). otherwise 10+ comprehensive system review is negative.    Physical Exam  General - The patient is nontoxic, in no distress. Alert and oriented to person and place, answers questions and cooperates with examination appropriately.   Neurologic - CN II-XII are intact. No focal neurologic deficits.   Voice and Breathing - The patient was breathing comfortably without the use of accessory muscles. There was no wheezing, stridor, or stertor.  The patients voice was clear and strong.  Eyes - Extraocular movements intact.  Sclera were not icteric or injected, conjunctiva were pink and moist.  Mouth - Examination of the oral cavity showed pink, healthy oral mucosa. No lesions or ulcerations noted.  The tongue was mobile and midline.  Throat - The walls of the oropharynx were smooth, symmetric, and had no lesions or ulcerations.  No postnasal drainage.  The uvula was midline on elevation.  Ears -  Bilateral pinna and EACs with normal appearing overlying skin. Tympanic membrane intact bilaterally. Bony landmarks of the ossicular chain are normal. No retraction, perforation, or masses.  No fluid or purulence was seen in the external canal or the middle ear.   Nose - External contour is symmetric, no gross deflection or scars. Nasal mucosa is pink and moist with no abnormal mucus.  The septum was deviated, turbinates with congestion.  No polyps anteriorly, no masses, or purulence noted on examination.  Neck - Soft, non-tender. Palpation of the occipital, submental, submandibular, internal jugular chain, and supraclavicular nodes did not demonstrate any abnormal lymph nodes or masses. No parotid masses. Palpation of the thyroid was soft and smooth, with no nodules or goiter appreciated.  The trachea was mobile and midline.        A/P - Enrico Leos is a 67 year old male with chronic sinusitis and nasal polyposis causing chronic cough and wheezing.  He is tried and failed numerous medical treatments including antihistamines, nasal steroids, nasal saline irrigations, prednisone, antibiotics, etc.  Treatments help for very short time sometimes but overall have no significant long-lasting impact.  He is diabetic but has a hemoglobin A1c of 6.0.  A Kenalog injection may be considered. I offered surgery and dupixent. He wants to try dupixent.  We will look to get a prior authorization for him.  If this fails we can consider Kenalog injection and surgery.    He seemed to have considerable sensorineural hearing loss today in clinic.  I recommend an audiogram and consideration of updating his hearing aids or at least wearing them more.    Skyler Pardo MD  Otolaryngology  [facility]

## 2022-11-01 ENCOUNTER — OFFICE VISIT (OUTPATIENT)
Dept: OTOLARYNGOLOGY | Facility: CLINIC | Age: 67
End: 2022-11-01
Attending: FAMILY MEDICINE
Payer: COMMERCIAL

## 2022-11-01 ENCOUNTER — TELEPHONE (OUTPATIENT)
Dept: RHEUMATOLOGY | Facility: CLINIC | Age: 67
End: 2022-11-01

## 2022-11-01 VITALS
HEART RATE: 109 BPM | BODY MASS INDEX: 29.63 KG/M2 | WEIGHT: 220 LBS | DIASTOLIC BLOOD PRESSURE: 83 MMHG | SYSTOLIC BLOOD PRESSURE: 121 MMHG

## 2022-11-01 DIAGNOSIS — H90.3 SENSORINEURAL HEARING LOSS (SNHL) OF BOTH EARS: ICD-10-CM

## 2022-11-01 DIAGNOSIS — J32.9 SINUSITIS, UNSPECIFIED CHRONICITY, UNSPECIFIED LOCATION: Primary | ICD-10-CM

## 2022-11-01 DIAGNOSIS — J33.9 NASAL POLYP: ICD-10-CM

## 2022-11-01 PROCEDURE — 99204 OFFICE O/P NEW MOD 45 MIN: CPT | Performed by: OTOLARYNGOLOGY

## 2022-11-01 RX ORDER — FLUTICASONE PROPIONATE 50 MCG
1 SPRAY, SUSPENSION (ML) NASAL DAILY
COMMUNITY
End: 2024-03-13

## 2022-11-01 RX ORDER — LORATADINE 10 MG/1
10 TABLET ORAL DAILY
COMMUNITY
End: 2023-11-20

## 2022-11-01 RX ORDER — CETIRIZINE HYDROCHLORIDE 10 MG/1
10 TABLET ORAL DAILY
COMMUNITY

## 2022-11-01 NOTE — TELEPHONE ENCOUNTER
----- Message from Skyler Pardo MD sent at 11/1/2022 10:17 AM CDT -----  Regarding: dupixent  This patient wants to try dupixent. Can you get the ball rolling?    Thanks!

## 2022-11-01 NOTE — LETTER
11/1/2022         RE: Enrico Leos  [address]  [facility] [address]        Dear Colleague,    Thank you for referring your patient, Enrico Leos, to the [facility]. Please see a copy of my visit note below.    Chief Complaint - sinusitis; hearing loss    History of Present Illness - Enrico Leos is a 67 year old male who returns for evaluation of chronic sinusitis. I saw him in 2018 for this. The patient describes symptoms of purulent drainage, lots of cough, nasal congestion, wheezing, plugged head. Smell seems okay. The patient notes some allergies. Treatments have included antibiotics, nasal steroids, nasal saline irrigations, and oral antihistamines. The treatments seem to only help. No prior history of sinus surgery. I personally reviewed the relevant clinical notes in Epic including the primary care providers note.     He also has sensorineural hearing loss for many years.  He states he had significant noise exposure through his job.  He does wear hearing aids but does not have them today.  He feels hearing is slightly and slowly worsening over the years.    Tests personally reviewed today for this visit:   1.) CT sinus 7/25/2018 showed chronic sinusitis  2.) Hgb A1C was 6.0 on 6/3/22    Past Medical History -   Patient Active Problem List   Diagnosis     Mixed hyperlipidemia     Lumbar radiculopathy     MEDIAL MENISCUS TEAR - left     OA (Osteoarthritis) of Knee - left     HYPERLIPIDEMIA LDL GOAL <130     Allergic rhinitis     Hyperlipidemia     Degenerative joint disease     Hypertension goal BP (blood pressure) < 140/90     Chronic fatigue     Joint ache     GERD (gastroesophageal reflux disease)     Chronic constipation     Advanced directives, counseling/discussion     Cervical radiculitis     Cervical radiculopathy     Post-traumatic osteoarthritis of left knee     Non-cardiac chest pain     Reflux esophagitis     Type 2 diabetes mellitus treated without  insulin (H)       Current Medications -   Current Outpatient Medications:      amoxicillin-clavulanate (AUGMENTIN) 875-125 MG tablet, Take 1 tablet by mouth 2 times daily, Disp: 28 tablet, Rfl: 1     aspirin 81 MG tablet, Take 1 tablet by mouth daily., Disp: , Rfl:      atorvastatin (LIPITOR) 40 MG tablet, Take 0.5 tablets (20 mg) by mouth daily, Disp: 90 tablet, Rfl: 4     azithromycin (ZITHROMAX) 250 MG tablet, Two tablets first day, then one tablet daily for four days. (Patient not taking: No sig reported), Disp: 6 tablet, Rfl: 0     azithromycin (ZITHROMAX) 250 MG tablet, TAKE 2 TABLETS BY MOUTH TODAY, THEN TAKE 1 TABLET DAILY FOR 4 DAYS (Patient not taking: Reported on 10/5/2022), Disp: 6 tablet, Rfl: 0     diclofenac (VOLTAREN) 1 % topical gel, Apply 4 g topically 4 times daily (Patient not taking: No sig reported), Disp: 1 Tube, Rfl: 3     fluticasone-salmeterol (ADVAIR DISKUS) 100-50 MCG/ACT inhaler, Inhale 1 puff into the lungs every 12 hours, Disp: 16 each, Rfl: 3     losartan (COZAAR) 100 MG tablet, TAKE 1 TABLET BY MOUTH EVERY DAY, Disp: 90 tablet, Rfl: 1     metFORMIN (GLUCOPHAGE-XR) 500 MG 24 hr tablet, TAKE 1 TABLET BY MOUTH TWICE A DAY WITH MEALS (Patient not taking: No sig reported), Disp: 180 tablet, Rfl: 0     metoprolol succinate ER (TOPROL-XL) 50 MG 24 hr tablet, TAKE 1 TABLET BY MOUTH EVERY DAY (Patient not taking: No sig reported), Disp: 90 tablet, Rfl: 1     omeprazole (PRILOSEC) 40 MG DR capsule, TAKE 1 CAPSULE BY MOUTH EVERY DAY (Patient not taking: Reported on 10/5/2022), Disp: 90 capsule, Rfl: 3     OneTouch Delica Lancets 33G MISC, 1 Box 2 times daily, Disp: 100 each, Rfl: 11     ONETOUCH VERIO IQ test strip, USE TO TEST BLOOD SUGARS 2 TIMES DAILY OR AS DIRECTED., Disp: 100 strip, Rfl: 23     OZEMPIC, 0.25 OR 0.5 MG/DOSE, 2 MG/1.5ML SOPN pen, INJECT 0.25 MG SUBCUTANEOUS EVERY 7 DAYS, Disp: 4.5 mL, Rfl: 1     predniSONE (DELTASONE) 20 MG tablet, Take 1 tablet (20 mg) by mouth 2 times  daily (Patient not taking: Reported on 10/5/2022), Disp: 10 tablet, Rfl: 0     sertraline (ZOLOFT) 50 MG tablet, TAKE 1 TABLET BY MOUTH EVERY DAY (Patient not taking: No sig reported), Disp: 90 tablet, Rfl: 1    Allergies -   Allergies   Allergen Reactions     No Known Drug Allergies        Social History -   Social History     Socioeconomic History     Marital status:      Spouse name: Sheridan     Number of children: 3   Occupational History     Occupation: [employer]     Employer: [email or web address]   Tobacco Use     Smoking status: Never     Smokeless tobacco: Never   Substance and Sexual Activity     Alcohol use: Yes     Comment: occasionally     Drug use: No     Sexual activity: Yes     Partners: Female       Family History -   Family History   Problem Relation Age of Onset     Cancer Mother         tumor on leg     Lipids Mother      Macular Degeneration Mother 70        shot tx , hx of smoking 40 yrs ago      Parkinsonism Father      Heart Disease Father         open heart by pass     Prostate Cancer Father      Cancer Maternal Grandmother         lung     Cerebrovascular Disease Paternal Grandmother      Glaucoma No family hx of        Review of Systems - As per HPI and PMHx, + hearing loss from noise exposure (he has hearing aids, isn't wearing them today). otherwise 10+ comprehensive system review is negative.    Physical Exam  General - The patient is nontoxic, in no distress. Alert and oriented to person and place, answers questions and cooperates with examination appropriately.   Neurologic - CN II-XII are intact. No focal neurologic deficits.   Voice and Breathing - The patient was breathing comfortably without the use of accessory muscles. There was no wheezing, stridor, or stertor.  The patients voice was clear and strong.  Eyes - Extraocular movements intact.  Sclera were not icteric or injected, conjunctiva were pink and moist.  Mouth - Examination of the oral cavity showed pink,  healthy oral mucosa. No lesions or ulcerations noted.  The tongue was mobile and midline.  Throat - The walls of the oropharynx were smooth, symmetric, and had no lesions or ulcerations.  No postnasal drainage.  The uvula was midline on elevation.  Ears - Bilateral pinna and EACs with normal appearing overlying skin. Tympanic membrane intact bilaterally. Bony landmarks of the ossicular chain are normal. No retraction, perforation, or masses.  No fluid or purulence was seen in the external canal or the middle ear.   Nose - External contour is symmetric, no gross deflection or scars. Nasal mucosa is pink and moist with no abnormal mucus.  The septum was deviated, turbinates with congestion.  No polyps anteriorly, no masses, or purulence noted on examination.  Neck - Soft, non-tender. Palpation of the occipital, submental, submandibular, internal jugular chain, and supraclavicular nodes did not demonstrate any abnormal lymph nodes or masses. No parotid masses. Palpation of the thyroid was soft and smooth, with no nodules or goiter appreciated.  The trachea was mobile and midline.        A/P - Enrico Leos is a 67 year old male with chronic sinusitis and nasal polyposis causing chronic cough and wheezing.  He is tried and failed numerous medical treatments including antihistamines, nasal steroids, nasal saline irrigations, prednisone, antibiotics, etc.  Treatments help for very short time sometimes but overall have no significant long-lasting impact.  He is diabetic but has a hemoglobin A1c of 6.0.  A Kenalog injection may be considered. I offered surgery and dupixent. He wants to try dupixent.  We will look to get a prior authorization for him.  If this fails we can consider Kenalog injection and surgery.    He seemed to have considerable sensorineural hearing loss today in clinic.  I recommend an audiogram and consideration of updating his hearing aids or at least wearing them more.    Skyler Pardo  MD  Otolaryngology  [facility]        Again, thank you for allowing me to participate in the care of your patient.        Sincerely,        Skyler Pardo MD

## 2022-11-01 NOTE — TELEPHONE ENCOUNTER
Order for generic dupixent placed. Can we do the PA team.      Devi RENEE RN Specialty Triage 11/1/2022 2:05 PM

## 2022-11-02 ENCOUNTER — TELEPHONE (OUTPATIENT)
Dept: OTOLARYNGOLOGY | Facility: CLINIC | Age: 67
End: 2022-11-02

## 2022-11-02 NOTE — TELEPHONE ENCOUNTER
PA Initiation    Medication: Dupixent - pending  Insurance Company: [employer] - Phone [phone] Fax [phone]  Pharmacy Filling the Rx: [facility] - [place], [place] - [facility] [address]  Filling Pharmacy Phone: [phone]  Filling Pharmacy Fax: [phone]  Start Date: 11/2/2022

## 2022-11-03 ENCOUNTER — TELEPHONE (OUTPATIENT)
Dept: FAMILY MEDICINE | Facility: CLINIC | Age: 67
End: 2022-11-03

## 2022-11-03 DIAGNOSIS — E11.9 TYPE 2 DIABETES MELLITUS TREATED WITHOUT INSULIN (H): ICD-10-CM

## 2022-11-03 DIAGNOSIS — I10 ESSENTIAL HYPERTENSION WITH GOAL BLOOD PRESSURE LESS THAN 140/90: ICD-10-CM

## 2022-11-03 RX ORDER — SEMAGLUTIDE 1.34 MG/ML
INJECTION, SOLUTION SUBCUTANEOUS
Qty: 4.5 ML | Refills: 1 | Status: SHIPPED | OUTPATIENT
Start: 2022-11-03 | End: 2023-04-13

## 2022-11-03 RX ORDER — LOSARTAN POTASSIUM 100 MG/1
TABLET ORAL
Qty: 90 TABLET | Refills: 1 | Status: SHIPPED | OUTPATIENT
Start: 2022-11-03 | End: 2023-04-18

## 2022-11-03 NOTE — TELEPHONE ENCOUNTER
Patient Quality Outreach    Patient is due for the following:   Diabetes -  A1C, Eye Exam and Foot Exam      Topic Date Due     COVID-19 Vaccine (1) Never done     Pneumococcal Vaccine (1 - PCV) Never done     Zoster (Shingles) Vaccine (1 of 2) Never done     Flu Vaccine (1) 09/01/2022     Diptheria Tetanus Pertussis (DTAP/TDAP/TD) Vaccine (3 - Td or Tdap) 09/24/2022       Next Steps:   Schedule a office visit for diabetes recheck     Type of outreach:    Sent [employer] message.      Questions for provider review:    None     LUCIANA CASTAÑEDA MA

## 2022-11-04 NOTE — TELEPHONE ENCOUNTER
PRIOR AUTHORIZATION DENIED    Medication: Dupixent - PA Denied    Denial Date: 11/3/2022    Denial Rational: There was not comfirmation that the patirnt is currently receiving therapy with an intranasal cortcosteroid.    Appeal Information: [employer] Phone: [phone]  Fax: [phone]       DISPLAY PLAN FREE TEXT DISPLAY PLAN FREE TEXT DISPLAY PLAN FREE TEXT DISPLAY PLAN FREE TEXT DISPLAY PLAN FREE TEXT DISPLAY PLAN FREE TEXT DISPLAY PLAN FREE TEXT DISPLAY PLAN FREE TEXT DISPLAY PLAN FREE TEXT DISPLAY PLAN FREE TEXT

## 2022-11-04 NOTE — TELEPHONE ENCOUNTER
PRIOR AUTHORIZATION DENIED    Medication: Dupixent - PA Denied    Denial Date: 11/3/2022    Denial Rational:     Appeal Information: ***

## 2022-11-04 NOTE — TELEPHONE ENCOUNTER
Medication Appeal Initiation    We have initiated an appeal for the requested medication:  Medication: Dupixent - PA Denied  Appeal Start Date:  11/4/2022  Insurance Company: [employer]  Comments:  Sent comfirmation that the patirnt is currently receiving therapy with an intranasal cortcosteroid.

## 2022-11-08 NOTE — TELEPHONE ENCOUNTER
MEDICATION APPEAL DENIED    Medication: Dupixent - PA Denied    Denial Date:      Denial Rational:     Second Level Appeal Information:     Second level appeals will be managed by the clinic staff and provider. Please contact the [employer] Prior Authorization Team if additional information about the denial is needed.    Have not received appeal denial fax at this time.

## 2022-11-08 NOTE — TELEPHONE ENCOUNTER
Erika do you can you tell me what the denial reason is?    Devi RENEE RN Specialty Triage 11/8/2022 11:13 AM

## 2022-11-09 NOTE — TELEPHONE ENCOUNTER
Erika,    Did you note that he has flonase in his chart it is pt reported?    Devi RENEE RN Specialty Triage 11/9/2022 7:31 AM

## 2022-11-09 NOTE — TELEPHONE ENCOUNTER
Prior Authorization Approval    Authorization Effective Date: 1/1/2022  Authorization Expiration Date: 5/8/2023  Medication: Dupixent - Appeal Approved  Approved Dose/Quantity: UD  Reference #: [id number]   Insurance Company: [employer] - Phone [phone] Fax [phone]  Expected CoPay: 957.99     CoPay Card Available:      Foundation Assistance Needed: Dupixent My Way  Which Pharmacy is filling the prescription (Not needed for infusion/clinic administered): [facility] [place] [place] [facility] [address]  Pharmacy Notified: Yes  Patient Notified: Yes    Per test claim and CMM, appeal has been approved with $958 copay.  Left message with  patient to discuss copay and patient assistance options.  Script at [facility] and available to be filled.

## 2022-12-09 ENCOUNTER — TELEPHONE (OUTPATIENT)
Dept: UROLOGY | Facility: CLINIC | Age: 67
End: 2022-12-09

## 2022-12-09 NOTE — TELEPHONE ENCOUNTER
----- Message from Skyler Pardo MD sent at 12/1/2022  3:05 PM CST -----  Regarding: FW: nasal rinse  Any way we can get this edda cheaper Dupixent? If not, let me know and I'll offer him kenalog or surgery.     Thanks,    Orestes  ----- Message -----  From: Josh Carter MD  Sent: 12/1/2022  12:53 PM CST  To: Skyler Pardo MD  Subject: RE: nasal rinse                                  My mistake. He did get the authorization for the injectable but the copay is over $950. He feels that he can not afford this. Is there an alternative therapy?  Josh Carter MD   ----- Message -----  From: Skyler Pardo MD  Sent: 12/1/2022  10:44 AM CST  To: Josh Carter MD, Devi Novoa RN  Subject: RE: nasal rinse                                  Hmm. I prescribed dupixent, an injectable medication we had to get prior auth for. It looks like this went through. Is this the medication you're talking about? I checked my note and don't recall discussing a nasal compound medication, which is usually a medicated rinse such as dexamethasone with gentamicin. Do you know which medication this was?    Orestes  ----- Message -----  From: Josh Carter MD  Sent: 12/1/2022  10:34 AM CST  To: Skyler Pardo MD  Subject: nasal rinse                                      You saw Enrico a month ago and ordered a nasal compound.  It was initially denied but later approved.  [facility] claims not to have received the prescription.  Can you have one of your staff send them a prescription?      Josh Carter MD

## 2022-12-09 NOTE — TELEPHONE ENCOUNTER
Contact pt and left  for him to call back or mychart.    Devi RENEE RN Specialty Triage 12/9/2022 1:42 PM

## 2022-12-15 ENCOUNTER — TELEPHONE (OUTPATIENT)
Dept: OTOLARYNGOLOGY | Facility: CLINIC | Age: 67
End: 2022-12-15

## 2022-12-15 NOTE — TELEPHONE ENCOUNTER
RN left message for patient to return call to clinic.  Provided call back number of [phone] to return call today until 4pm.  Directed to call main line at [phone] if returning call after that time.    Shelia MONTAGUE, Specialty RN 12/15/2022 9:54 AM              ----- Message from Skyler Pardo MD sent at 12/15/2022  9:40 AM CST -----  Regarding: FW: nasal rinse  I can't remember if I sent this, but can you schedule this patient a follow-up in clinic so I can discuss either kenalog or surgery. It isn't urgent, something in the next 1-2 months.

## 2022-12-16 NOTE — TELEPHONE ENCOUNTER
Spoke with pt, he wants to try to obtain dupixent at affordable cost. He will come to [facility] and fill out small portion of forms at .    Devi RENEE RN Specialty Triage 12/16/2022 12:50 PM     Statement Selected

## 2023-01-13 DIAGNOSIS — K21.00 GASTROESOPHAGEAL REFLUX DISEASE WITH ESOPHAGITIS: ICD-10-CM

## 2023-01-13 RX ORDER — OMEPRAZOLE 40 MG/1
CAPSULE, DELAYED RELEASE ORAL
Qty: 90 CAPSULE | Refills: 3 | Status: SHIPPED | OUTPATIENT
Start: 2023-01-13 | End: 2024-01-11

## 2023-02-12 ENCOUNTER — HEALTH MAINTENANCE LETTER (OUTPATIENT)
Age: 68
End: 2023-02-12

## 2023-02-13 DIAGNOSIS — E11.9 TYPE 2 DIABETES MELLITUS TREATED WITHOUT INSULIN (H): Primary | ICD-10-CM

## 2023-02-13 DIAGNOSIS — Z12.5 SCREENING FOR PROSTATE CANCER: ICD-10-CM

## 2023-03-09 ENCOUNTER — TELEPHONE (OUTPATIENT)
Dept: FAMILY MEDICINE | Facility: CLINIC | Age: 68
End: 2023-03-09
Payer: COMMERCIAL

## 2023-03-09 DIAGNOSIS — R06.2 WHEEZING: ICD-10-CM

## 2023-03-09 RX ORDER — PREDNISONE 20 MG/1
TABLET ORAL
Qty: 20 TABLET | Refills: 0 | Status: SHIPPED | OUTPATIENT
Start: 2023-03-09 | End: 2023-06-20

## 2023-03-09 RX ORDER — FLUTICASONE PROPIONATE AND SALMETEROL 100; 50 UG/1; UG/1
1 POWDER RESPIRATORY (INHALATION) EVERY 12 HOURS
Qty: 16 EACH | Refills: 3 | Status: SHIPPED | OUTPATIENT
Start: 2023-03-09 | End: 2024-03-13

## 2023-03-09 NOTE — TELEPHONE ENCOUNTER
Patient wheezing with no fever or chills.  Has used prednisone before.  Told to watch his blood sugars.

## 2023-03-22 DIAGNOSIS — E11.9 TYPE 2 DIABETES MELLITUS TREATED WITHOUT INSULIN (H): ICD-10-CM

## 2023-03-22 RX ORDER — BLOOD SUGAR DIAGNOSTIC
STRIP MISCELLANEOUS
Qty: 100 STRIP | Refills: 23 | Status: SHIPPED | OUTPATIENT
Start: 2023-03-22

## 2023-03-24 ENCOUNTER — TELEPHONE (OUTPATIENT)
Dept: FAMILY MEDICINE | Facility: CLINIC | Age: 68
End: 2023-03-24
Payer: COMMERCIAL

## 2023-03-24 DIAGNOSIS — E11.9 TYPE 2 DIABETES MELLITUS TREATED WITHOUT INSULIN (H): Primary | ICD-10-CM

## 2023-03-24 RX ORDER — LANCETS
EACH MISCELLANEOUS
Qty: 400 EACH | Refills: 0 | Status: SHIPPED | OUTPATIENT
Start: 2023-03-24

## 2023-03-24 NOTE — TELEPHONE ENCOUNTER
Fax message: His insurance will only cover Accu Chek Guide- Please send RXs for meter, strips, and lancets.

## 2023-04-17 DIAGNOSIS — E78.2 MIXED HYPERLIPIDEMIA: ICD-10-CM

## 2023-04-17 RX ORDER — ATORVASTATIN CALCIUM 40 MG/1
TABLET, FILM COATED ORAL
Qty: 90 TABLET | Refills: 4 | Status: SHIPPED | OUTPATIENT
Start: 2023-04-17 | End: 2024-03-13

## 2023-04-18 ENCOUNTER — TELEPHONE (OUTPATIENT)
Dept: FAMILY MEDICINE | Facility: CLINIC | Age: 68
End: 2023-04-18
Payer: COMMERCIAL

## 2023-04-18 DIAGNOSIS — E11.9 TYPE 2 DIABETES MELLITUS TREATED WITHOUT INSULIN (H): Primary | ICD-10-CM

## 2023-04-18 DIAGNOSIS — I10 ESSENTIAL HYPERTENSION WITH GOAL BLOOD PRESSURE LESS THAN 140/90: ICD-10-CM

## 2023-04-18 RX ORDER — LOSARTAN POTASSIUM 100 MG/1
TABLET ORAL
Qty: 90 TABLET | Refills: 1 | Status: SHIPPED | OUTPATIENT
Start: 2023-04-18 | End: 2023-10-03

## 2023-04-18 NOTE — TELEPHONE ENCOUNTER
Pharmacy calling to request updated Ozempic Rx sent. Rx sent today is incorrect as it was noted to inject 2 mg. Patient currently using 0.25 mg. However, Ozempic pens have changed to come in 3 mL pen but still able to inject 0.25 mg.      Pharmacy provided NDC number to get the correct Rx sent. NDC: [id number].    Routing to provider to review and advise.    Ana Rodriguez RN  [facility]

## 2023-04-18 NOTE — TELEPHONE ENCOUNTER
Message from [facility] in regards to ozempic-new concentration is 2 mg/3 ml, please send new RX.Tenisha Lange MA/TC

## 2023-04-25 DIAGNOSIS — E11.9 TYPE 2 DIABETES MELLITUS TREATED WITHOUT INSULIN (H): ICD-10-CM

## 2023-04-25 RX ORDER — BLOOD-GLUCOSE METER
EACH MISCELLANEOUS
Qty: 1 KIT | Refills: 3 | Status: SHIPPED | OUTPATIENT
Start: 2023-04-25

## 2023-05-02 ENCOUNTER — TELEPHONE (OUTPATIENT)
Dept: FAMILY MEDICINE | Facility: CLINIC | Age: 68
End: 2023-05-02
Payer: COMMERCIAL

## 2023-05-02 NOTE — TELEPHONE ENCOUNTER
Patient Quality Outreach    Patient is due for the following:   Diabetes -  A1C, Eye Exam and Foot Exam      Topic Date Due     COVID-19 Vaccine (1) Never done     Pneumococcal Vaccine (1 - PCV) Never done     Zoster (Shingles) Vaccine (1 of 2) Never done     Flu Vaccine (1) 09/01/2022     Diptheria Tetanus Pertussis (DTAP/TDAP/TD) Vaccine (2 - Td or Tdap) 09/24/2022       Next Steps:   Schedule a office visit for diabetes recheck    Type of outreach:    Sent [employer] message.      Questions for provider review:    None     LUCIANA CASTAÑEDA MA

## 2023-05-21 ENCOUNTER — HEALTH MAINTENANCE LETTER (OUTPATIENT)
Age: 68
End: 2023-05-21

## 2023-06-08 DIAGNOSIS — J32.9 SINUSITIS, UNSPECIFIED CHRONICITY, UNSPECIFIED LOCATION: ICD-10-CM

## 2023-06-20 ENCOUNTER — TELEPHONE (OUTPATIENT)
Dept: FAMILY MEDICINE | Facility: CLINIC | Age: 68
End: 2023-06-20
Payer: COMMERCIAL

## 2023-06-20 DIAGNOSIS — R06.2 WHEEZING: ICD-10-CM

## 2023-06-20 RX ORDER — PREDNISONE 20 MG/1
TABLET ORAL
Qty: 20 TABLET | Refills: 0 | Status: SHIPPED | OUTPATIENT
Start: 2023-06-20 | End: 2023-10-17

## 2023-06-23 DIAGNOSIS — E11.9 TYPE 2 DIABETES MELLITUS TREATED WITHOUT INSULIN (H): ICD-10-CM

## 2023-06-23 RX ORDER — BLOOD SUGAR DIAGNOSTIC
STRIP MISCELLANEOUS
Qty: 400 STRIP | Refills: 0 | Status: SHIPPED | OUTPATIENT
Start: 2023-06-23 | End: 2023-08-28

## 2023-06-27 ENCOUNTER — TELEPHONE (OUTPATIENT)
Dept: RHEUMATOLOGY | Facility: CLINIC | Age: 68
End: 2023-06-27
Payer: COMMERCIAL

## 2023-06-27 NOTE — TELEPHONE ENCOUNTER
Messages below regarding dupixent. Pt had signed papers regarding and PA was approved but costly. Can you help the pt with the cost? He can be very difficult to get a hold of.    CORBY Maki RN 6/27/2023 10:59 AM

## 2023-06-27 NOTE — TELEPHONE ENCOUNTER
----- Message from Skyler Pardo MD sent at 6/20/2023  8:54 AM CDT -----  Regarding: FW: sinuses  Do you know anything about this? See below.    Orestes  ----- Message -----  From: Josh Carter MD  Sent: 6/20/2023   8:53 AM CDT  To: Skyler Pardo MD  Subject: sinuses                                          Hi Skyler:  you had seen Enrico in the past for sinuses and had prescribed a nasal rinse.  He found it was $1000 per month and did not fill it.  I believe your nurse had put a request to his insurance to have it covered.  He has not heard back and his symptoms have worsened.  Do you know if it was covered or where the process with his insurance is at this time? Josh Carter MD

## 2023-06-28 ENCOUNTER — TELEPHONE (OUTPATIENT)
Dept: OTOLARYNGOLOGY | Facility: CLINIC | Age: 68
End: 2023-06-28
Payer: COMMERCIAL

## 2023-06-28 DIAGNOSIS — J33.9 NASAL POLYP: ICD-10-CM

## 2023-06-28 NOTE — TELEPHONE ENCOUNTER
Central Prior Authorization Team   Phone: [phone]    PA Initiation    Medication: DUPIXENT (dupilumab) 300MG/2ML pre-filled syringes    Insurance Company: [employer] - Phone [phone] Fax [phone]  Pharmacy Filling the Rx: [facility] - [place], [place] - [address]  Filling Pharmacy Phone: [phone]  Filling Pharmacy Fax:    Start Date: 6/28/2023          Per appeal last year patient is receiving intranasal corticosteroid. Found flonase 50mcg/act spray on patient's med list  It does not appear patient started this medication, started PA as an initiation of therapy

## 2023-06-29 NOTE — TELEPHONE ENCOUNTER
Prior Authorization Approval    Authorization Effective Date: 5/29/2023  Authorization Expiration Date: 12/25/2023  Medication: DUPIXENT (dupilumab) 300MG/2ML pre-filled syringes    Approved Dose/Quantity:   Reference #:     Insurance Company: [employer] - Phone [phone] Fax [phone]  Expected CoPay:       CoPay Card Available:      Foundation Assistance Needed:    Which Pharmacy is filling the prescription (Not needed for infusion/clinic administered): [facility] - [place] [address]  Pharmacy Notified: Yes  Patient Notified: Yes

## 2023-06-30 NOTE — TELEPHONE ENCOUNTER
Forwarding to Dr. Pardo. Dr. Pardo saw patient 11/2022. Dupixent was initiated at this time. Would you like the patient to follow-up before we send a new fill?    Angela LEIGH RN, Specialty Clinic 06/30/23 11:58 AM

## 2023-06-30 NOTE — TELEPHONE ENCOUNTER
Patient states dupixent was not approved yet and he just applied for patient financial assistance so he is waiting to hear on that. Scheduled appt for patient on 8/2 to discuss treatment and options from there.     Angela LEIGH RN, Specialty Clinic 06/30/23 12:56 PM

## 2023-07-03 NOTE — TELEPHONE ENCOUNTER
It looks like he's had the Dupixent filled or the script was sent to the [facility]. Usually the PFA's would handle this.    I will contact them and inquire. If not, I will contact the patient to see if we can help.    Thank you!    Leonie Arreguin  Prescription Assistance Supervisor  Pharmacy Assistance  [phone]

## 2023-07-12 ENCOUNTER — TELEPHONE (OUTPATIENT)
Dept: FAMILY MEDICINE | Facility: CLINIC | Age: 68
End: 2023-07-12
Payer: COMMERCIAL

## 2023-07-12 NOTE — TELEPHONE ENCOUNTER
July 11, 2023 I spoke with Enrico, he is in need of financial assistance for Dupixent and Ozempic.    We reviewed the Pharmacy Assistance Fund $500, the Prescription Assistance Program for manfacturer assistance programs, gross income, insurance and Rx list.    Enrico is over income for the Pharmacy Assistance Fund $500.     assistance applications will be completed for: Dupixent & Ozempic.    The Dupixent application will be sent to Dr. Pardo for review and signature via interoffice mail.    The Ozempic application will be sent to Dr. Carter for review and signature via interoffice mail.    When approved, Enrico will receive this medication at no cost through December 2023.    Leonie Arreguin  Prescription Assistance Supervisor  Pharmacy Assistance  [phone]

## 2023-07-21 NOTE — TELEPHONE ENCOUNTER
Received Ozempic application via Interoffice.  Placed in Dr Rivas basket to complete  When complete place application in interoffice envelope attached that will be going to [facility], [address] Arreguin.  Thank you,  Halle CACERES    [phone]

## 2023-08-02 ENCOUNTER — OFFICE VISIT (OUTPATIENT)
Dept: OTOLARYNGOLOGY | Facility: CLINIC | Age: 68
End: 2023-08-02
Payer: COMMERCIAL

## 2023-08-02 VITALS
SYSTOLIC BLOOD PRESSURE: 108 MMHG | OXYGEN SATURATION: 97 % | HEART RATE: 114 BPM | RESPIRATION RATE: 18 BRPM | DIASTOLIC BLOOD PRESSURE: 70 MMHG

## 2023-08-02 DIAGNOSIS — J32.4 CHRONIC PANSINUSITIS: Primary | ICD-10-CM

## 2023-08-02 DIAGNOSIS — J33.9 NASAL POLYP: ICD-10-CM

## 2023-08-02 PROCEDURE — 99214 OFFICE O/P EST MOD 30 MIN: CPT | Performed by: OTOLARYNGOLOGY

## 2023-08-02 RX ORDER — MONTELUKAST SODIUM 10 MG/1
10 TABLET ORAL AT BEDTIME
Qty: 30 TABLET | Refills: 11 | Status: SHIPPED | OUTPATIENT
Start: 2023-08-02 | End: 2024-03-13

## 2023-08-02 RX ORDER — SULFAMETHOXAZOLE/TRIMETHOPRIM 800-160 MG
1 TABLET ORAL 2 TIMES DAILY
Qty: 28 TABLET | Refills: 0 | Status: SHIPPED | OUTPATIENT
Start: 2023-08-02 | End: 2023-08-16

## 2023-08-02 NOTE — LETTER
8/2/2023         RE: Enrico Leos  [address]  [facility] [address]        Dear Colleague,    Thank you for referring your patient, Enrico Leos, to the [facility]. Please see a copy of my visit note below.    Chief Complaint - sinusitis    History of Present Illness - Enrico Leos is a 67 year old male who returns for evaluation of chronic sinusitis. I saw him in 2018 and 11/2018 for this. The patient describes symptoms of purulent drainage, lots of cough, nasal congestion, wheezing, plugged head. Smell seems okay. The patient notes some allergies. Treatments have included antibiotics, nasal steroids, nasal saline irrigations, and oral antihistamines. The treatments seem to only help. No prior history of sinus surgery.  A Kenalog injection may be considered. I offered surgery and dupixent. He wanted to try dupixent. However, he struggled getting this approved, but is working on it.      He says this Spring was really bad. Had chest congestion and cough. Has sinus pressure, worse in forehead. He still gets discolored drainage. Prednisone and amoxicillin helped for a time. He uses nasal irrigations with distilled water, flonase, and allergy medication.    Tests personally reviewed today for this visit:   1.) CT sinus 7/25/2018 showed chronic pansinusitis. No acute infection.   2.) Hgb A1C was 6.0 on 6/3/22    Past Medical History -   Patient Active Problem List   Diagnosis     Mixed hyperlipidemia     Lumbar radiculopathy     MEDIAL MENISCUS TEAR - left     OA (Osteoarthritis) of Knee - left     HYPERLIPIDEMIA LDL GOAL <130     Allergic rhinitis     Hyperlipidemia     Degenerative joint disease     Hypertension goal BP (blood pressure) < 140/90     Chronic fatigue     Joint ache     GERD (gastroesophageal reflux disease)     Chronic constipation     Advanced directives, counseling/discussion     Cervical radiculitis     Cervical radiculopathy     Post-traumatic  osteoarthritis of left knee     Non-cardiac chest pain     Reflux esophagitis     Type 2 diabetes mellitus treated without insulin (H)       Current Medications -   Current Outpatient Medications:      ACCU-CHEK GUIDE test strip, USE TO TEST BLOOD SUGAR 4 TIMES DAILY OR AS DIRECTED, Disp: 400 strip, Rfl: 0     amoxicillin-clavulanate (AUGMENTIN) 875-125 MG tablet, Take 1 tablet by mouth 2 times daily, Disp: 28 tablet, Rfl: 1     aspirin 81 MG tablet, Take 1 tablet by mouth daily., Disp: , Rfl:      atorvastatin (LIPITOR) 40 MG tablet, TAKE 1 TABLET BY MOUTH EVERY DAY, Disp: 90 tablet, Rfl: 4     azithromycin (ZITHROMAX) 250 MG tablet, Two tablets first day, then one tablet daily for four days. (Patient not taking: Reported on 6/8/2022), Disp: 6 tablet, Rfl: 0     azithromycin (ZITHROMAX) 250 MG tablet, TAKE 2 TABLETS BY MOUTH TODAY, THEN TAKE 1 TABLET DAILY FOR 4 DAYS (Patient not taking: Reported on 10/5/2022), Disp: 6 tablet, Rfl: 0     Blood Glucose Monitoring Suppl (ACCU-CHEK GUIDE ME) w/Device KIT, USE TO TEST BLOOD SUGAR 4 TIMES DAILY OR AS DIRECTED., Disp: 1 kit, Rfl: 3     cetirizine (ZYRTEC) 10 MG tablet, Take 10 mg by mouth daily, Disp: , Rfl:      diclofenac (VOLTAREN) 1 % topical gel, Apply 4 g topically 4 times daily (Patient not taking: No sig reported), Disp: 1 Tube, Rfl: 3     dupilumab (DUPIXENT) 300 MG/2ML prefilled syringe, Inject 2 mLs (300 mg) Subcutaneous every 14 days, Disp: 4 mL, Rfl: 11     fluticasone (FLONASE) 50 MCG/ACT nasal spray, Spray 1 spray into both nostrils daily, Disp: , Rfl:      fluticasone-salmeterol (ADVAIR DISKUS) 100-50 MCG/ACT inhaler, Inhale 1 puff into the lungs every 12 hours, Disp: 16 each, Rfl: 3     loratadine (CLARITIN) 10 MG tablet, Take 10 mg by mouth daily, Disp: , Rfl:      losartan (COZAAR) 100 MG tablet, TAKE 1 TABLET BY MOUTH EVERY DAY, Disp: 90 tablet, Rfl: 1     metFORMIN (GLUCOPHAGE-XR) 500 MG 24 hr tablet, TAKE 1 TABLET BY MOUTH TWICE A DAY WITH MEALS  (Patient not taking: No sig reported), Disp: 180 tablet, Rfl: 0     metoprolol succinate ER (TOPROL-XL) 50 MG 24 hr tablet, TAKE 1 TABLET BY MOUTH EVERY DAY (Patient not taking: No sig reported), Disp: 90 tablet, Rfl: 1     omeprazole (PRILOSEC) 40 MG DR capsule, TAKE 1 CAPSULE BY MOUTH EVERY DAY, Disp: 90 capsule, Rfl: 3     OneTouch Delica Lancets 33G MISC, 1 Box 2 times daily, Disp: 100 each, Rfl: 11     ONETOUCH VERIO IQ test strip, USE TO TEST BLOOD SUGARS 2 TIMES DAILY OR AS DIRECTED., Disp: 100 strip, Rfl: 23     predniSONE (DELTASONE) 20 MG tablet, Take 3 tabs by mouth daily x 3 days, then 2 tabs daily x 3 days, then 1 tab daily x 3 days, then 1/2 tab daily x 3 days., Disp: 20 tablet, Rfl: 0     predniSONE (DELTASONE) 20 MG tablet, Take 1 tablet (20 mg) by mouth 2 times daily (Patient not taking: Reported on 10/5/2022), Disp: 10 tablet, Rfl: 0     semaglutide (OZEMPIC) 2 MG/3ML pen, Inject 0.25 mg Subcutaneous every 7 days, Disp: 9 mL, Rfl: 0     sertraline (ZOLOFT) 50 MG tablet, TAKE 1 TABLET BY MOUTH EVERY DAY (Patient not taking: No sig reported), Disp: 90 tablet, Rfl: 1     thin (NO BRAND SPECIFIED) lancets, Test blood sugars 4 times daily  To accompany: Blood Glucose Monitor Brands: per insurance., Disp: 400 each, Rfl: 0    Allergies -   Allergies   Allergen Reactions     No Known Drug Allergy        Social History -   Social History     Socioeconomic History     Marital status:      Spouse name: Sheridan     Number of children: 3   Occupational History     Occupation: [employer]     Employer: [employer]   Tobacco Use     Smoking status: Never     Smokeless tobacco: Never   Substance and Sexual Activity     Alcohol use: Yes     Comment: occasionally     Drug use: No     Sexual activity: Yes     Partners: Female       Family History -   Family History   Problem Relation Age of Onset     Cancer Mother         tumor on leg     Lipids Mother      Macular Degeneration Mother 70         shot tx , hx of smoking 40 yrs ago      Parkinsonism Father      Heart Disease Father         open heart by pass     Prostate Cancer Father      Cancer Maternal Grandmother         lung     Cerebrovascular Disease Paternal Grandmother      Glaucoma No family hx of        Review of Systems - As per HPI and PMHx, + hearing loss from noise exposure (he has hearing aids, isn't wearing them today). Otherwise system review is negative.    Physical Exam  General - The patient is nontoxic, in no distress. Alert, answers questions and cooperates with examination appropriately.   Neurologic - CN II-XII are intact. No focal neurologic deficits.   Voice and Breathing - The patient was breathing comfortably without the use of accessory muscles. There was no wheezing, stridor, or stertor.  The patients voice was clear and strong.  Eyes - Extraocular movements intact.  Sclera were not icteric or injected, conjunctiva were pink and moist.  Mouth - Examination of the oral cavity showed pink, healthy oral mucosa. No lesions or ulcerations noted.  The tongue was mobile and midline.  Throat - The walls of the oropharynx were smooth, symmetric, and had no lesions or ulcerations.  No postnasal drainage.  The uvula was midline on elevation.   Ears - Bilateral pinna and EACs with normal appearing overlying skin. Tympanic membrane intact bilaterally. Bony landmarks of the ossicular chain are normal. No retraction, perforation, or masses.  No fluid or purulence was seen in the external canal or the middle ear.   Nose - External contour is symmetric, no gross deflection or scars. Nasal mucosa is pink and moist with no abnormal mucus. The septum was deviated, turbinates with congestion.  No polyps anteriorly, but it is congested and difficult to see more posterior. no masses, or purulence noted on examination.  Neck - Soft, non-tender. Palpation of the occipital, submental, submandibular, internal jugular chain, and supraclavicular nodes did  not demonstrate any abnormal lymph nodes or masses. No parotid masses. Palpation of the thyroid was soft and smooth, with no nodules or goiter appreciated.  The trachea was mobile and midline.        A/P - Enrico Leos is a 67 year old male with chronic sinusitis and nasal polyposis causing sinunasal symptoms, chronic cough and wheezing.  He is tried and failed numerous medical treatments including antihistamines, nasal steroids, nasal saline irrigations, prednisone, antibiotics, etc.  Treatments help for very short time sometimes but overall have no significant long-lasting impact. Steroids help the most. However, he is diabetic and last hemoglobin A1c of 6.0 6/2022. I will start him on singulair, change him from claritin to zyrtec. Keep doing irrigations and flonase. Get A1C checked, if A1C is good we can consider a Kenalog injection. He will keep working on coverage for Dupixent. I will prescribe Bactrim given the discolored drainage today.     Skyler Pardo MD  Otolaryngology  [facility]      Again, thank you for allowing me to participate in the care of your patient.        Sincerely,        Skyler Pardo MD

## 2023-08-02 NOTE — PROGRESS NOTES
Chief Complaint - sinusitis    History of Present Illness - Enrico Leos is a 67 year old male who returns for evaluation of chronic sinusitis. I saw him in 2018 and 11/2018 for this. The patient describes symptoms of purulent drainage, lots of cough, nasal congestion, wheezing, plugged head. Smell seems okay. The patient notes some allergies. Treatments have included antibiotics, nasal steroids, nasal saline irrigations, and oral antihistamines. The treatments seem to only help. No prior history of sinus surgery.  A Kenalog injection may be considered. I offered surgery and dupixent. He wanted to try dupixent. However, he struggled getting this approved, but is working on it.      He says this Spring was really bad. Had chest congestion and cough. Has sinus pressure, worse in forehead. He still gets discolored drainage. Prednisone and amoxicillin helped for a time. He uses nasal irrigations with distilled water, flonase, and allergy medication.    Tests personally reviewed today for this visit:   1.) CT sinus 7/25/2018 showed chronic pansinusitis. No acute infection.   2.) Hgb A1C was 6.0 on 6/3/22    Past Medical History -   Patient Active Problem List   Diagnosis    Mixed hyperlipidemia    Lumbar radiculopathy    MEDIAL MENISCUS TEAR - left    OA (Osteoarthritis) of Knee - left    HYPERLIPIDEMIA LDL GOAL <130    Allergic rhinitis    Hyperlipidemia    Degenerative joint disease    Hypertension goal BP (blood pressure) < 140/90    Chronic fatigue    Joint ache    GERD (gastroesophageal reflux disease)    Chronic constipation    Advanced directives, counseling/discussion    Cervical radiculitis    Cervical radiculopathy    Post-traumatic osteoarthritis of left knee    Non-cardiac chest pain    Reflux esophagitis    Type 2 diabetes mellitus treated without insulin (H)       Current Medications -   Current Outpatient Medications:     ACCU-CHEK GUIDE test strip, USE TO TEST BLOOD SUGAR 4 TIMES DAILY OR AS DIRECTED,  Disp: 400 strip, Rfl: 0    amoxicillin-clavulanate (AUGMENTIN) 875-125 MG tablet, Take 1 tablet by mouth 2 times daily, Disp: 28 tablet, Rfl: 1    aspirin 81 MG tablet, Take 1 tablet by mouth daily., Disp: , Rfl:     atorvastatin (LIPITOR) 40 MG tablet, TAKE 1 TABLET BY MOUTH EVERY DAY, Disp: 90 tablet, Rfl: 4    azithromycin (ZITHROMAX) 250 MG tablet, Two tablets first day, then one tablet daily for four days. (Patient not taking: Reported on 6/8/2022), Disp: 6 tablet, Rfl: 0    azithromycin (ZITHROMAX) 250 MG tablet, TAKE 2 TABLETS BY MOUTH TODAY, THEN TAKE 1 TABLET DAILY FOR 4 DAYS (Patient not taking: Reported on 10/5/2022), Disp: 6 tablet, Rfl: 0    Blood Glucose Monitoring Suppl (ACCU-CHEK GUIDE ME) w/Device KIT, USE TO TEST BLOOD SUGAR 4 TIMES DAILY OR AS DIRECTED., Disp: 1 kit, Rfl: 3    cetirizine (ZYRTEC) 10 MG tablet, Take 10 mg by mouth daily, Disp: , Rfl:     diclofenac (VOLTAREN) 1 % topical gel, Apply 4 g topically 4 times daily (Patient not taking: No sig reported), Disp: 1 Tube, Rfl: 3    dupilumab (DUPIXENT) 300 MG/2ML prefilled syringe, Inject 2 mLs (300 mg) Subcutaneous every 14 days, Disp: 4 mL, Rfl: 11    fluticasone (FLONASE) 50 MCG/ACT nasal spray, Spray 1 spray into both nostrils daily, Disp: , Rfl:     fluticasone-salmeterol (ADVAIR DISKUS) 100-50 MCG/ACT inhaler, Inhale 1 puff into the lungs every 12 hours, Disp: 16 each, Rfl: 3    loratadine (CLARITIN) 10 MG tablet, Take 10 mg by mouth daily, Disp: , Rfl:     losartan (COZAAR) 100 MG tablet, TAKE 1 TABLET BY MOUTH EVERY DAY, Disp: 90 tablet, Rfl: 1    metFORMIN (GLUCOPHAGE-XR) 500 MG 24 hr tablet, TAKE 1 TABLET BY MOUTH TWICE A DAY WITH MEALS (Patient not taking: No sig reported), Disp: 180 tablet, Rfl: 0    metoprolol succinate ER (TOPROL-XL) 50 MG 24 hr tablet, TAKE 1 TABLET BY MOUTH EVERY DAY (Patient not taking: No sig reported), Disp: 90 tablet, Rfl: 1    omeprazole (PRILOSEC) 40 MG DR capsule, TAKE 1 CAPSULE BY MOUTH EVERY DAY,  Disp: 90 capsule, Rfl: 3    OneTouch Delica Lancets 33G MISC, 1 Box 2 times daily, Disp: 100 each, Rfl: 11    ONETOUCH VERIO IQ test strip, USE TO TEST BLOOD SUGARS 2 TIMES DAILY OR AS DIRECTED., Disp: 100 strip, Rfl: 23    predniSONE (DELTASONE) 20 MG tablet, Take 3 tabs by mouth daily x 3 days, then 2 tabs daily x 3 days, then 1 tab daily x 3 days, then 1/2 tab daily x 3 days., Disp: 20 tablet, Rfl: 0    predniSONE (DELTASONE) 20 MG tablet, Take 1 tablet (20 mg) by mouth 2 times daily (Patient not taking: Reported on 10/5/2022), Disp: 10 tablet, Rfl: 0    semaglutide (OZEMPIC) 2 MG/3ML pen, Inject 0.25 mg Subcutaneous every 7 days, Disp: 9 mL, Rfl: 0    sertraline (ZOLOFT) 50 MG tablet, TAKE 1 TABLET BY MOUTH EVERY DAY (Patient not taking: No sig reported), Disp: 90 tablet, Rfl: 1    thin (NO BRAND SPECIFIED) lancets, Test blood sugars 4 times daily  To accompany: Blood Glucose Monitor Brands: per insurance., Disp: 400 each, Rfl: 0    Allergies -   Allergies   Allergen Reactions    No Known Drug Allergy        Social History -   Social History     Socioeconomic History    Marital status:      Spouse name: Sheridan    Number of children: 3   Occupational History    Occupation: [employer]     Employer: [employer]   Tobacco Use    Smoking status: Never    Smokeless tobacco: Never   Substance and Sexual Activity    Alcohol use: Yes     Comment: occasionally    Drug use: No    Sexual activity: Yes     Partners: Female       Family History -   Family History   Problem Relation Age of Onset    Cancer Mother         tumor on leg    Lipids Mother     Macular Degeneration Mother 70        shot tx , hx of smoking 40 yrs ago     Parkinsonism Father     Heart Disease Father         open heart by pass    Prostate Cancer Father     Cancer Maternal Grandmother         lung    Cerebrovascular Disease Paternal Grandmother     Glaucoma No family hx of        Review of Systems - As per HPI and PMHx, + hearing  loss from noise exposure (he has hearing aids, isn't wearing them today). Otherwise system review is negative.    Physical Exam  General - The patient is nontoxic, in no distress. Alert, answers questions and cooperates with examination appropriately.   Neurologic - CN II-XII are intact. No focal neurologic deficits.   Voice and Breathing - The patient was breathing comfortably without the use of accessory muscles. There was no wheezing, stridor, or stertor.  The patients voice was clear and strong.  Eyes - Extraocular movements intact.  Sclera were not icteric or injected, conjunctiva were pink and moist.  Mouth - Examination of the oral cavity showed pink, healthy oral mucosa. No lesions or ulcerations noted.  The tongue was mobile and midline.  Throat - The walls of the oropharynx were smooth, symmetric, and had no lesions or ulcerations.  No postnasal drainage.  The uvula was midline on elevation.   Ears - Bilateral pinna and EACs with normal appearing overlying skin. Tympanic membrane intact bilaterally. Bony landmarks of the ossicular chain are normal. No retraction, perforation, or masses.  No fluid or purulence was seen in the external canal or the middle ear.   Nose - External contour is symmetric, no gross deflection or scars. Nasal mucosa is pink and moist with no abnormal mucus. The septum was deviated, turbinates with congestion.  No polyps anteriorly, but it is congested and difficult to see more posterior. no masses, or purulence noted on examination.  Neck - Soft, non-tender. Palpation of the occipital, submental, submandibular, internal jugular chain, and supraclavicular nodes did not demonstrate any abnormal lymph nodes or masses. No parotid masses. Palpation of the thyroid was soft and smooth, with no nodules or goiter appreciated.  The trachea was mobile and midline.        A/P - Enrico ROBINA Leos is a 67 year old male with chronic sinusitis and nasal polyposis causing sinunasal symptoms, chronic  cough and wheezing.  He is tried and failed numerous medical treatments including antihistamines, nasal steroids, nasal saline irrigations, prednisone, antibiotics, etc.  Treatments help for very short time sometimes but overall have no significant long-lasting impact. Steroids help the most. However, he is diabetic and last hemoglobin A1c of 6.0 6/2022. I will start him on singulair, change him from claritin to zyrtec. Keep doing irrigations and flonase. Get A1C checked, if A1C is good we can consider a Kenalog injection. He will keep working on coverage for Dupixent. I will prescribe Bactrim given the discolored drainage today.     Skyler Pardo MD  Otolaryngology  [facility]

## 2023-08-03 DIAGNOSIS — E11.9 TYPE 2 DIABETES MELLITUS TREATED WITHOUT INSULIN (H): ICD-10-CM

## 2023-08-19 ENCOUNTER — HEALTH MAINTENANCE LETTER (OUTPATIENT)
Age: 68
End: 2023-08-19

## 2023-08-28 ENCOUNTER — TELEPHONE (OUTPATIENT)
Dept: FAMILY MEDICINE | Facility: CLINIC | Age: 68
End: 2023-08-28
Payer: COMMERCIAL

## 2023-08-28 ENCOUNTER — MYC MEDICAL ADVICE (OUTPATIENT)
Dept: FAMILY MEDICINE | Facility: CLINIC | Age: 68
End: 2023-08-28

## 2023-08-28 DIAGNOSIS — R05.9 COUGH: ICD-10-CM

## 2023-08-28 DIAGNOSIS — R06.2 WHEEZING: Primary | ICD-10-CM

## 2023-08-28 RX ORDER — PREDNISONE 20 MG/1
20 TABLET ORAL 2 TIMES DAILY
Qty: 10 TABLET | Refills: 2 | Status: SHIPPED | OUTPATIENT
Start: 2023-08-28 | End: 2023-11-20

## 2023-08-28 NOTE — TELEPHONE ENCOUNTER
Patient Quality Outreach    Patient is due for the following:   Diabetes -  A1C, Eye Exam, Microalbumin, Statin, Diabetic Follow-Up Visit, and Foot Exam  Physical Annual Wellness Visit      Topic Date Due    COVID-19 Vaccine (1) Never done    Pneumococcal Vaccine (1 - PCV) Never done    Zoster (Shingles) Vaccine (1 of 2) Never done    Diptheria Tetanus Pertussis (DTAP/TDAP/TD) Vaccine (2 - Td or Tdap) 09/24/2022    Flu Vaccine (1) 09/01/2023       Next Steps:   Schedule a office visit for diabetes recheck and  Annual Wellness Visit    Type of outreach:    Sent [employer] message.      Questions for provider review:    None           LUCIANA CASTAÑEDA MA

## 2023-09-04 DIAGNOSIS — R06.2 WHEEZING: ICD-10-CM

## 2023-09-05 RX ORDER — PREDNISONE 20 MG/1
TABLET ORAL
Qty: 20 TABLET | Refills: 0 | Status: SHIPPED | OUTPATIENT
Start: 2023-09-05 | End: 2023-11-27

## 2023-10-03 DIAGNOSIS — K21.00 GASTROESOPHAGEAL REFLUX DISEASE WITH ESOPHAGITIS WITHOUT HEMORRHAGE: ICD-10-CM

## 2023-10-03 DIAGNOSIS — Z12.5 SCREENING FOR PROSTATE CANCER: ICD-10-CM

## 2023-10-03 DIAGNOSIS — I10 ESSENTIAL HYPERTENSION WITH GOAL BLOOD PRESSURE LESS THAN 140/90: ICD-10-CM

## 2023-10-03 DIAGNOSIS — E78.2 MIXED HYPERLIPIDEMIA: ICD-10-CM

## 2023-10-03 DIAGNOSIS — E11.9 TYPE 2 DIABETES MELLITUS TREATED WITHOUT INSULIN (H): Primary | ICD-10-CM

## 2023-10-03 DIAGNOSIS — R78.71 ELEVATED BLOOD LEAD LEVEL: ICD-10-CM

## 2023-10-03 RX ORDER — LOSARTAN POTASSIUM 100 MG/1
TABLET ORAL
Qty: 90 TABLET | Refills: 1 | Status: SHIPPED | OUTPATIENT
Start: 2023-10-03 | End: 2024-05-17

## 2023-10-10 ENCOUNTER — TELEPHONE (OUTPATIENT)
Dept: FAMILY MEDICINE | Facility: CLINIC | Age: 68
End: 2023-10-10
Payer: COMMERCIAL

## 2023-10-10 NOTE — TELEPHONE ENCOUNTER
Enrico's Dupixent & Ozempic applications have been submitted to the Dupixent and [employer] assistance programs.    We will note Epic when we have a decision from each program.    Thanks so much for your help!    Leonie Arreguin  Prescription Assistance Supervisor  Pharmacy Assistance

## 2023-10-17 ENCOUNTER — TELEPHONE (OUTPATIENT)
Dept: FAMILY MEDICINE | Facility: CLINIC | Age: 68
End: 2023-10-17
Payer: COMMERCIAL

## 2023-10-17 DIAGNOSIS — R06.2 WHEEZING: ICD-10-CM

## 2023-10-17 RX ORDER — PREDNISONE 20 MG/1
TABLET ORAL
Qty: 20 TABLET | Refills: 0 | Status: SHIPPED | OUTPATIENT
Start: 2023-10-17 | End: 2023-11-27

## 2023-10-17 NOTE — TELEPHONE ENCOUNTER
Received ozempic from [employer], meds fpr patient assistance. Placed in [facility] refrigerator. Called and informed patient.Tenisha Lange [facility]

## 2023-10-27 ENCOUNTER — LAB (OUTPATIENT)
Dept: LAB | Facility: CLINIC | Age: 68
End: 2023-10-27
Payer: COMMERCIAL

## 2023-10-27 ENCOUNTER — TELEPHONE (OUTPATIENT)
Dept: FAMILY MEDICINE | Facility: CLINIC | Age: 68
End: 2023-10-27

## 2023-10-27 DIAGNOSIS — R78.71 ELEVATED BLOOD LEAD LEVEL: ICD-10-CM

## 2023-10-27 DIAGNOSIS — K21.00 GASTROESOPHAGEAL REFLUX DISEASE WITH ESOPHAGITIS WITHOUT HEMORRHAGE: ICD-10-CM

## 2023-10-27 DIAGNOSIS — E11.9 TYPE 2 DIABETES MELLITUS TREATED WITHOUT INSULIN (H): ICD-10-CM

## 2023-10-27 DIAGNOSIS — Z12.5 SCREENING FOR PROSTATE CANCER: ICD-10-CM

## 2023-10-27 LAB
ALBUMIN SERPL BCG-MCNC: 3.9 G/DL (ref 3.5–5.2)
ALP SERPL-CCNC: 69 U/L (ref 40–129)
ALT SERPL W P-5'-P-CCNC: 69 U/L (ref 0–70)
ANION GAP SERPL CALCULATED.3IONS-SCNC: 11 MMOL/L (ref 7–15)
AST SERPL W P-5'-P-CCNC: 16 U/L (ref 0–45)
BASOPHILS # BLD AUTO: 0 10E3/UL (ref 0–0.2)
BASOPHILS NFR BLD AUTO: 0 %
BILIRUB SERPL-MCNC: 0.5 MG/DL
BUN SERPL-MCNC: 18.2 MG/DL (ref 8–23)
CALCIUM SERPL-MCNC: 9.3 MG/DL (ref 8.8–10.2)
CHLORIDE SERPL-SCNC: 100 MMOL/L (ref 98–107)
CHOLEST SERPL-MCNC: 185 MG/DL
CREAT SERPL-MCNC: 0.93 MG/DL (ref 0.67–1.17)
DEPRECATED HCO3 PLAS-SCNC: 27 MMOL/L (ref 22–29)
EGFRCR SERPLBLD CKD-EPI 2021: >90 ML/MIN/1.73M2
EOSINOPHIL # BLD AUTO: 0.1 10E3/UL (ref 0–0.7)
EOSINOPHIL NFR BLD AUTO: 1 %
ERYTHROCYTE [DISTWIDTH] IN BLOOD BY AUTOMATED COUNT: 14.8 % (ref 10–15)
GLUCOSE SERPL-MCNC: 163 MG/DL (ref 70–99)
HBA1C MFR BLD: 8.3 % (ref 0–5.6)
HCT VFR BLD AUTO: 46.5 % (ref 40–53)
HDLC SERPL-MCNC: 45 MG/DL
HGB BLD-MCNC: 15.2 G/DL (ref 13.3–17.7)
IMM GRANULOCYTES # BLD: 0.1 10E3/UL
IMM GRANULOCYTES NFR BLD: 1 %
LDLC SERPL CALC-MCNC: 96 MG/DL
LYMPHOCYTES # BLD AUTO: 3.5 10E3/UL (ref 0.8–5.3)
LYMPHOCYTES NFR BLD AUTO: 41 %
MCH RBC QN AUTO: 26.1 PG (ref 26.5–33)
MCHC RBC AUTO-ENTMCNC: 32.7 G/DL (ref 31.5–36.5)
MCV RBC AUTO: 80 FL (ref 78–100)
MONOCYTES # BLD AUTO: 0.6 10E3/UL (ref 0–1.3)
MONOCYTES NFR BLD AUTO: 7 %
NEUTROPHILS # BLD AUTO: 4.2 10E3/UL (ref 1.6–8.3)
NEUTROPHILS NFR BLD AUTO: 49 %
NONHDLC SERPL-MCNC: 140 MG/DL
PLATELET # BLD AUTO: 287 10E3/UL (ref 150–450)
POTASSIUM SERPL-SCNC: 3.9 MMOL/L (ref 3.4–5.3)
PROT SERPL-MCNC: 6.6 G/DL (ref 6.4–8.3)
PSA SERPL DL<=0.01 NG/ML-MCNC: 9.35 NG/ML (ref 0–4.5)
RBC # BLD AUTO: 5.83 10E6/UL (ref 4.4–5.9)
SODIUM SERPL-SCNC: 138 MMOL/L (ref 135–145)
TRIGL SERPL-MCNC: 222 MG/DL
WBC # BLD AUTO: 8.6 10E3/UL (ref 4–11)

## 2023-10-27 PROCEDURE — 36415 COLL VENOUS BLD VENIPUNCTURE: CPT

## 2023-10-27 PROCEDURE — 83036 HEMOGLOBIN GLYCOSYLATED A1C: CPT

## 2023-10-27 PROCEDURE — G0103 PSA SCREENING: HCPCS

## 2023-10-27 PROCEDURE — 99000 SPECIMEN HANDLING OFFICE-LAB: CPT

## 2023-10-27 PROCEDURE — 85025 COMPLETE CBC W/AUTO DIFF WBC: CPT

## 2023-10-27 PROCEDURE — 80061 LIPID PANEL: CPT

## 2023-10-27 PROCEDURE — 80053 COMPREHEN METABOLIC PANEL: CPT

## 2023-10-27 PROCEDURE — 83655 ASSAY OF LEAD: CPT | Mod: 90

## 2023-10-27 NOTE — TELEPHONE ENCOUNTER
Enrico has been approved into the [employer] prescription assistance program for Ozempic 2mg/3mL though December 31,2023. He will receive this medication at no cost through the enrollment period.     A 90 day supply of Ozempic has already been delivered to the [facility] and Enrico has already received it. Enrico has been informed of approval.     Thank you!    Mercy Macias   Prescription Assistance Assistant

## 2023-10-29 LAB — LEAD BLDV-MCNC: 14.2 UG/DL

## 2023-10-30 DIAGNOSIS — R97.20 ELEVATED PROSTATE SPECIFIC ANTIGEN (PSA): Primary | ICD-10-CM

## 2023-10-30 DIAGNOSIS — R09.81 NASAL CONGESTION: Primary | ICD-10-CM

## 2023-10-30 DIAGNOSIS — E11.9 TYPE 2 DIABETES MELLITUS TREATED WITHOUT INSULIN (H): ICD-10-CM

## 2023-10-30 RX ORDER — LEVOCETIRIZINE DIHYDROCHLORIDE 5 MG/1
5 TABLET, FILM COATED ORAL EVERY EVENING
Qty: 30 TABLET | Refills: 11 | Status: SHIPPED | OUTPATIENT
Start: 2023-10-30 | End: 2024-08-09

## 2023-11-14 ENCOUNTER — OFFICE VISIT (OUTPATIENT)
Dept: UROLOGY | Facility: CLINIC | Age: 68
End: 2023-11-14
Attending: FAMILY MEDICINE
Payer: COMMERCIAL

## 2023-11-14 VITALS
WEIGHT: 214.2 LBS | SYSTOLIC BLOOD PRESSURE: 132 MMHG | BODY MASS INDEX: 28.85 KG/M2 | HEART RATE: 94 BPM | OXYGEN SATURATION: 98 % | DIASTOLIC BLOOD PRESSURE: 87 MMHG

## 2023-11-14 DIAGNOSIS — R97.20 ELEVATED PROSTATE SPECIFIC ANTIGEN (PSA): ICD-10-CM

## 2023-11-14 PROCEDURE — 51798 US URINE CAPACITY MEASURE: CPT | Performed by: UROLOGY

## 2023-11-14 PROCEDURE — 99204 OFFICE O/P NEW MOD 45 MIN: CPT | Mod: 25 | Performed by: UROLOGY

## 2023-11-14 RX ORDER — LEVOFLOXACIN 500 MG/1
500 TABLET, FILM COATED ORAL DAILY
Qty: 3 TABLET | Refills: 0 | Status: SHIPPED | OUTPATIENT
Start: 2023-11-14 | End: 2023-11-17

## 2023-11-14 NOTE — PATIENT INSTRUCTIONS
Please call the [facility] radiology to schedule an MRI of the prostate. [phone].  We will contact you with results/plan once Dr. Badillo has reviewed them.   Please call our office if you have questions-- [phone].  Please contact your insurance company to make sure the MRI scan is covered under your insurance plan.  Prostate Ultrasound Instructions  Dr. Badillo  [address]   [place] [address]  [phone]      Appointment Date:    Time:       Take antibiotics as directed on label. START THE DAY OF THE BIOPSY   1 Fleets enema to be done 1    - 2 hours before procedure   NOTHING BY MOUTH AFTER THE ENEMA   If you are taking blood thinners (Aspirin, ibuprofen, Aleve) this is to be stopped ONE WEEK before the procedure. Tylenol is ok. If you are taking Coumadin, you must check with your primary doctor for further instructions  Please follow up in the office with the doctor ONE WEEK after the procedure to go over results      Follow up appointment:  Date:  Time:    PROSTATE BIOPSY  Patient information:  You have had an examination of the prostate gland called a JAYNE (digital rectal examination) where a finger was inserted into your rectum to enable the doctor to feel your prostate gland. You may also have had a PSA (prostate specific antigen) blood test.  Sometimes an elevated PSA level and an abnormal JAYNE can be signs of prostate cancer. The doctor has recommended that you have a prostate biopsy.  What is a prostate biopsy:  You will be positioned for the biopsy as preferred by the physician.  An ultrasound probe is inserted into the rectum and a needle is then passed through the wall of the rectum and an injection of local anesthetic is given. Following the injection of local anesthetic, a needle will be inserted into the prostate gland to take a sample of cells. The doctor will usually take 12 separate biopsies.  You can hear some clicking sounds from the biopsy instrument whilst the biopsies are taken.  The  procedure will take approximately five to ten minutes.   What are the risks?  Infection: As there is a chance of infection we give you antibiotics before the procedure to reduce the risk. However is you experience a fever, chills, nausea, or just not feeling up to 72 hours after the biopsy you need to go directly to the emergency room.   Antibiotics: The physician will send a prescription to your preferred pharmacy.  We will call you once your culture results come back to let you that you can pick them up. You will follow the directions on the bottle.  Sometimes 2 different antibiotics are sent. Start the antibiotics on THE DAY OF THE BIOPSY.   Bleeding: Some men will see blood in the urine, semen and stool.  Bleeding can last for 6-8 weeks intermittently.  If you take any medication such as aspirin, warfarin, clopidogrel then the risk of bleeding will be higher.   Retention: Some men are unable to pass urine after the biopsy. This is called urine retention. This is very rare.  If you are unable to urinate please call our clinic or go to urgent care or the emergency room.   Getting the results:  The biopsy samples will be sent to a laboratory for examination by a pathologist.  It can take up to one week, sometimes longer, for your doctor to receive the results.  You will be given an appointment to return to clinic for the results of your biopsy within 1-2 weeks. If you do not receive an appointment for the results of the biopsy at the time of the scheduling, please contact the clinic to arrange an appointment for the results.   WE ARE NOT IN ANY CIRCUMSTANCES PERMITTED TO GIVE RESULTS OVER THE PHONE    What to expect following a biopsy:  You are advised to take it easy for the remainder of the day.  You may eat and drink as normal.  No restrictions to your normal daily routine.  As there is a risk of infection you will be given a course of antibiotics.  PLEASE COMPLETE THE FULL COURSE AS INSTRUCTED   Contact  information:  Please call the clinic with any further questions [phone] *Do not leave an urgent message on this voicemail as we may not receive it until the following business day*

## 2023-11-14 NOTE — PROGRESS NOTES
S: Enrico Leos is a pleasant  68 year old male who was requested to be seen by  Josh Carter for a consult with regard to patient's elevated PSA.  His recent PSA was found to be   PSA   Date Value Ref Range Status   01/15/2021 6.91 (H) 0 - 4 ug/L Final     Comment:     Assay Method:  Chemiluminescence using Siemens Vista analyzer     Prostate Specific Antigen Screen   Date Value Ref Range Status   10/27/2023 9.35 (H) 0.00 - 4.50 ng/mL Final   06/03/2022 7.79 (H) 0.00 - 4.00 ug/L Final   .  His previous PSA was abnormal for several years.  Patient complains of minimal LUTS.  He has history of elevated PSA but no previous biopsy .  His AUA Symptom Score:  6.  Current Outpatient Medications   Medication Sig Dispense Refill    aspirin 81 MG tablet Take 1 tablet by mouth daily.      atorvastatin (LIPITOR) 40 MG tablet TAKE 1 TABLET BY MOUTH EVERY DAY 90 tablet 4    Blood Glucose Monitoring Suppl (ACCU-CHEK GUIDE ME) w/Device KIT USE TO TEST BLOOD SUGAR 4 TIMES DAILY OR AS DIRECTED. 1 kit 3    cetirizine (ZYRTEC) 10 MG tablet Take 10 mg by mouth daily      fluticasone (FLONASE) 50 MCG/ACT nasal spray Spray 1 spray into both nostrils daily      fluticasone-salmeterol (ADVAIR DISKUS) 100-50 MCG/ACT inhaler Inhale 1 puff into the lungs every 12 hours 16 each 3    levocetirizine (XYZAL) 5 MG tablet Take 1 tablet (5 mg) by mouth every evening 30 tablet 11    levofloxacin (LEVAQUIN) 500 MG tablet Take 1 tablet (500 mg) by mouth daily for 3 days To start one day before biopsy 3 tablet 0    losartan (COZAAR) 100 MG tablet TAKE 1 TABLET BY MOUTH EVERY DAY 90 tablet 1    montelukast (SINGULAIR) 10 MG tablet Take 1 tablet (10 mg) by mouth At Bedtime 30 tablet 11    omeprazole (PRILOSEC) 40 MG DR capsule TAKE 1 CAPSULE BY MOUTH EVERY DAY 90 capsule 3    OneTouch Delica Lancets 33G MISC 1 Box 2 times daily 100 each 11    ONETOUCH VERIO IQ test strip USE TO TEST BLOOD SUGARS 2 TIMES DAILY OR AS DIRECTED. 100 strip 23     semaglutide (OZEMPIC) 2 MG/3ML pen Inject 0.5 mg Subcutaneous every 7 days 9 mL 3    thin (NO BRAND SPECIFIED) lancets Test blood sugars 4 times daily  To accompany: Blood Glucose Monitor Brands: per insurance. 400 each 0    amoxicillin-clavulanate (AUGMENTIN) 875-125 MG tablet Take 1 tablet by mouth 2 times daily (Patient not taking: Reported on 11/14/2023) 42 tablet 1    dupilumab (DUPIXENT) 300 MG/2ML prefilled syringe Inject 2 mLs (300 mg) Subcutaneous every 14 days (Patient not taking: Reported on 11/14/2023) 4 mL 11    loratadine (CLARITIN) 10 MG tablet Take 10 mg by mouth daily (Patient not taking: Reported on 11/14/2023)      predniSONE (DELTASONE) 20 MG tablet Take 3 tabs by mouth daily x 3 days, then 2 tabs daily x 3 days, then 1 tab daily x 3 days, then 1/2 tab daily x 3 days. (Patient not taking: Reported on 11/14/2023) 20 tablet 0    predniSONE (DELTASONE) 20 MG tablet TAKE 3 TABS BY MOUTH DAILY X 3 DAYS, THEN 2 TABS DAILY X 3 DAYS, THEN 1 TAB DAILY X 3 DAYS, THEN 1/2 TAB DAILY X 3 DAYS. (Patient not taking: Reported on 11/14/2023) 20 tablet 0    predniSONE (DELTASONE) 20 MG tablet Take 1 tablet (20 mg) by mouth 2 times daily (Patient not taking: Reported on 11/14/2023) 10 tablet 2      Allergies   Allergen Reactions    No Known Drug Allergy       Past Medical History:   Diagnosis Date    Allergic rhinitis, cause unspecified     Allergic rhinitis    Chronic fatigue 9/9/2011    Degenerative joint disease     Diabetes mellitus, type 2 (H) 1/20/2021    History of spinal cord injury low back surgery    History of thrombophlebitis as a infant    Hypertension     Lumbago     Other and unspecified hyperlipidemia     Pure hypercholesterolemia      Past Surgical History:   Procedure Laterality Date    AS REPAIR DETACH RETINA,SCLERAL BUCKLE Left 11/18/2020    VRS Dr Chau    H ARGON LASER FOR RETINAL TEAR Right 04/06/2021    VRS Dr Chau    HC KNEE SCOPE,MED/LAT MENISECTOMY  08/25/2010    WORK COMP, medial      LASIK Bilateral 2009    and 2015    [place] SPINE SURGERY PROCEDURE UNLISTED      REMOVAL OF SPERM DUCT(S)  1992    Vasectomy    VASECTOMY  26 years ago    ZZC BIOPSY SYNOVIUM KNEE JOINT  1977    Semilunar Cartilage Excision, Knee    ZZC MONZON W/O FACETEC FORAMOT/DSKC 1/2 VRT SEG, LUMBAR  1986    Lami, Lumbar    ZZC STOMACH SURGERY PROCEDURE UNLISTED      infant    ZZHC REPAIR INCISIONAL HERNIA,REDUCIBLE      Hernia Repair, Incisional, Bilateral      Family History   Problem Relation Age of Onset    Cancer Mother         tumor on leg    Lipids Mother     Macular Degeneration Mother 70        shot tx , hx of smoking 40 yrs ago     Parkinsonism Father     Heart Disease Father         open heart by pass    Prostate Cancer Father     Cancer Maternal Grandmother         lung    Cerebrovascular Disease Paternal Grandmother     Glaucoma No family hx of      He does have a family history of prostate cancer.  Social History     Socioeconomic History    Marital status:      Spouse name: Sheridan    Number of children: 3    Years of education: Not on file    Highest education level: Not on file   Occupational History    Occupation: [employer]     Employer: [employer]   Tobacco Use    Smoking status: Never    Smokeless tobacco: Never   Substance and Sexual Activity    Alcohol use: Yes     Comment: occasionally    Drug use: No    Sexual activity: Yes     Partners: Female   Other Topics Concern    Parent/sibling w/ CABG, MI or angioplasty before 65F 55M? Not Asked   Social History Narrative    Not on file     Social Determinants of Health     Financial Resource Strain: Not on file   Food Insecurity: Not on file   Transportation Needs: Not on file   Physical Activity: Not on file   Stress: Not on file   Social Connections: Not on file   Interpersonal Safety: Not on file   Housing Stability: Not on file        REVIEW OF SYSTEMS  =================  C: NEGATIVE for fever, chills, change in weight  I: NEGATIVE for  worrisome rashes, moles or lesions  E/M: NEGATIVE for ear, mouth and throat problems  R: NEGATIVE for significant cough or SHORTNESS OF BREATH  CV:  NEGATIVE for chest pain, palpitations or peripheral edema  GI: NEGATIVE for nausea, abdominal pain, heartburn, or change in bowel habits  NEURO: NEGATIVE  PSYCH: NEGATIVE    Physical Exam:  /87 (BP Location: Right arm, Patient Position: Chair, Cuff Size: Adult Large)   Pulse 94   Wt 97.2 kg (214 lb 3.2 oz)   SpO2 98%   BMI 28.85 kg/m     Patient is pleasant, in no acute distress, good general condition.  Lung: no evidence of respiratory distress    Abdomen: Soft, nondistended, non tender. No masses. No rebound or guarding.   Exam: penis no discharge.  Testis no masses.  No scrotal skin lesion.  Prostate apex only.  PVR < 50 ml  Skin: Warm and dry.  No redness.  Musculaskeletal: moving all extremities.  No weakness.  Neuro non focal  Psych normal mood and affect    Assessment/Plan:   (R97.20) Elevated prostate specific antigen (PSA)  Comment:    Plan: PSA and prostate cancer discussed.  Discussed prostate biopsy next to rule out cancer.  Risks of infection and bleeding discussed.  We will schedule patient for a prostate MRI next.  Location of biopsy determined by MRI result.

## 2023-11-15 ENCOUNTER — TELEPHONE (OUTPATIENT)
Dept: FAMILY MEDICINE | Facility: CLINIC | Age: 68
End: 2023-11-15
Payer: COMMERCIAL

## 2023-11-15 DIAGNOSIS — R05.3 CHRONIC COUGH: Primary | ICD-10-CM

## 2023-11-15 NOTE — TELEPHONE ENCOUNTER
Per Dr Carter. Patient's insurance did not cover Ozempic 1.0. Dr Carter did an appeal letter(in Epic), but I do not see that a PA was even done. Please advise.Tenisha Lange [facility]

## 2023-11-20 RX ORDER — FLUTICASONE PROPIONATE AND SALMETEROL 250; 50 UG/1; UG/1
1 POWDER RESPIRATORY (INHALATION) EVERY 12 HOURS
Qty: 60 EACH | Refills: 3 | Status: SHIPPED | OUTPATIENT
Start: 2023-11-20 | End: 2024-03-13

## 2023-11-20 NOTE — TELEPHONE ENCOUNTER
Enrico has been approved into the [employer] prescription assistance program for Ozempic 2mg/3mL though December 31,2023. He will receive this medication at no cost through the enrollment period.      A 90 day supply of Ozempic has already been delivered to the [facility] and Enrico has already received it. Enrico has been informed of approval.      Thank you!     Mercy Macias   Prescription Assistance Assistant     Is this the dose, he is to be on?    Tenisha Lange [facility]

## 2023-11-20 NOTE — TELEPHONE ENCOUNTER
Is he not getting his meds though the patient assistance program? Which is what the PA department said.Tenisha Lange [facility]

## 2023-11-20 NOTE — TELEPHONE ENCOUNTER
The patient did receive medication to the end of the year, however I have changed the dose from 0.25 to .5.  He went to get the medication and is being charged full price for the higher dose.  Can we get him the higher dose?

## 2023-11-28 NOTE — PROGRESS NOTES
"  Marsha Weston is a 68 year old, presenting for the following health issues:  Cough and Nasal Congestion        11/29/2023     9:41 AM   Additional Questions   Roomed by Jeyson RENEE LPN   Accompanied by Self         11/29/2023     9:41 AM   Patient Reported Additional Medications   Patient reports taking the following new medications None       History of Present Illness       Headaches:   Since the patient's last clinic visit, headaches are: no change  The patient is getting headaches:  Weekly with caughing and congestion  He is able to do normal daily activities when he has a migraine.  The patient is taking the following rescue/relief medications:  Ibuprofen (Advil, Motrin) and Tylenol   Patient states \"I get some relief\" from the rescue/relief medications.   The patient is taking the following medications to prevent migraines:  Other  In the past 4 weeks, the patient has gone to an Urgent Care or Emergency Room 0 times times due to headaches.    Reason for visit:  Congestion    He eats 2-3 servings of fruits and vegetables daily.He consumes 0 sweetened beverage(s) daily.He exercises with enough effort to increase his heart rate 20 to 29 minutes per day.  He exercises with enough effort to increase his heart rate 4 days per week.   He is taking medications regularly.     SUBJECTIVE:  68 year old.The patient has a complaint of pansinsitis.  This started  over a couple of years ago. He has seen ENT and  has been prescribed Dupixent.  He has not been able to get insureance to cover this and can not pay the $1000 Co pay.  Location has gone from head and now has wheezing. quality productive cough Associated symptoms are nasal congestion not controlled by antihistamine but slight improvement with Sudafed wheezing especially at night as are all of symptoms .   .  Better with prednisone but it raises his blood sugars. . ROS no fever or chills insomnia from the cough and nasal congestion.    Reviewed health " "maintenance  Patient Active Problem List   Diagnosis    Mixed hyperlipidemia    Lumbar radiculopathy    MEDIAL MENISCUS TEAR - left    OA (Osteoarthritis) of Knee - left    HYPERLIPIDEMIA LDL GOAL <130    Allergic rhinitis    Hyperlipidemia    Degenerative joint disease    Hypertension goal BP (blood pressure) < 140/90    Chronic fatigue    Joint ache    GERD (gastroesophageal reflux disease)    Chronic constipation    Advanced directives, counseling/discussion    Cervical radiculitis    Cervical radiculopathy    Post-traumatic osteoarthritis of left knee    Non-cardiac chest pain    Reflux esophagitis    Type 2 diabetes mellitus treated without insulin (H)     Past Medical History:   Diagnosis Date    Allergic rhinitis, cause unspecified     Allergic rhinitis    Chronic fatigue 9/9/2011    Degenerative joint disease     Diabetes mellitus, type 2 (H) 1/20/2021    History of spinal cord injury low back surgery    History of thrombophlebitis as a infant    Hypertension     Lumbago     Other and unspecified hyperlipidemia     Pure hypercholesterolemia        OBJECTIVE:  no apparent distress  /80   Pulse 112   Temp 97.6  F (36.4  C) (Tympanic)   Resp 21   Ht 1.835 m (6' 0.25\")   Wt 96.6 kg (213 lb)   SpO2 94%   BMI 28.69 kg/m    Nasal congestion  LUNGS:  CTA B/L,  wheezing in all lung fields on expiration   Cardiovascular: negative, PMI normal. No lifts, heaves, or thrills. RRR. No murmurs, clicks gallops or rub   Gastrointestinal: Abdomen soft, non-tender. BS normal. No masses, organomegaly   No improvement of wheezing with a albuterol neb    ICD-10-CM    1. Chronic cough  R05.3 albuterol (PROVENTIL) neb solution 2.5 mg     XR Chest 2 Views     doxycycline hyclate (VIBRA-TABS) 100 MG tablet     predniSONE (DELTASONE) 10 MG tablet     montelukast (SINGULAIR) 10 MG tablet      2. Chronic pansinusitis  J32.4        PLAN:Recheck one month        "

## 2023-11-29 ENCOUNTER — ANCILLARY PROCEDURE (OUTPATIENT)
Dept: GENERAL RADIOLOGY | Facility: CLINIC | Age: 68
End: 2023-11-29
Attending: FAMILY MEDICINE
Payer: COMMERCIAL

## 2023-11-29 ENCOUNTER — OFFICE VISIT (OUTPATIENT)
Dept: FAMILY MEDICINE | Facility: CLINIC | Age: 68
End: 2023-11-29
Payer: COMMERCIAL

## 2023-11-29 VITALS
SYSTOLIC BLOOD PRESSURE: 120 MMHG | RESPIRATION RATE: 21 BRPM | DIASTOLIC BLOOD PRESSURE: 80 MMHG | HEART RATE: 112 BPM | HEIGHT: 72 IN | WEIGHT: 213 LBS | TEMPERATURE: 97.6 F | OXYGEN SATURATION: 94 % | BODY MASS INDEX: 28.85 KG/M2

## 2023-11-29 DIAGNOSIS — R05.3 CHRONIC COUGH: ICD-10-CM

## 2023-11-29 DIAGNOSIS — R05.3 CHRONIC COUGH: Primary | ICD-10-CM

## 2023-11-29 DIAGNOSIS — J32.4 CHRONIC PANSINUSITIS: ICD-10-CM

## 2023-11-29 PROCEDURE — 94640 AIRWAY INHALATION TREATMENT: CPT | Performed by: FAMILY MEDICINE

## 2023-11-29 PROCEDURE — 71046 X-RAY EXAM CHEST 2 VIEWS: CPT | Mod: TC | Performed by: RADIOLOGY

## 2023-11-29 PROCEDURE — 99214 OFFICE O/P EST MOD 30 MIN: CPT | Mod: 25 | Performed by: FAMILY MEDICINE

## 2023-11-29 RX ORDER — RESPIRATORY SYNCYTIAL VIRUS VACCINE 120MCG/0.5
0.5 KIT INTRAMUSCULAR ONCE
Qty: 1 EACH | Refills: 0 | Status: CANCELLED | OUTPATIENT
Start: 2023-11-29 | End: 2023-11-29

## 2023-11-29 RX ORDER — PREDNISONE 10 MG/1
10 TABLET ORAL DAILY
Qty: 30 TABLET | Refills: 0 | Status: SHIPPED | OUTPATIENT
Start: 2023-11-29 | End: 2024-03-13

## 2023-11-29 RX ORDER — DOXYCYCLINE HYCLATE 100 MG
100 TABLET ORAL 2 TIMES DAILY
Qty: 42 TABLET | Refills: 0 | Status: SHIPPED | OUTPATIENT
Start: 2023-11-29 | End: 2024-03-13

## 2023-11-29 RX ORDER — ALBUTEROL SULFATE 0.83 MG/ML
2.5 SOLUTION RESPIRATORY (INHALATION) ONCE
Status: COMPLETED | OUTPATIENT
Start: 2023-11-29 | End: 2023-11-29

## 2023-11-29 RX ORDER — MONTELUKAST SODIUM 10 MG/1
10 TABLET ORAL AT BEDTIME
Qty: 30 TABLET | Refills: 0 | Status: SHIPPED | OUTPATIENT
Start: 2023-11-29 | End: 2023-12-21

## 2023-11-29 RX ADMIN — ALBUTEROL SULFATE 2.5 MG: 0.83 SOLUTION RESPIRATORY (INHALATION) at 12:00

## 2023-11-29 ASSESSMENT — PAIN SCALES - GENERAL: PAINLEVEL: NO PAIN (0)

## 2023-11-29 NOTE — PROGRESS NOTES
The following medication was given:     MEDICATION:  Albuterol Sulfate 0.083% 2.5mg/3mL  ROUTE: Inhalation  SITE: mouth  DOSE: 2.5mg  LOT #: [id number]  : RiteDaxel  EXPIRATION DATE: 08/30/2025  NDC#: 06964-855-59   Was there drug waste? No  Multi-dose vial: Magalis RENEE LPN   [facility]   Pediatric/Adult Family Practice   11/29/23 at 12:03 PM

## 2023-11-29 NOTE — TELEPHONE ENCOUNTER
Is there an update on the Rx dupixent for this patient? He is being seen today and Dr. Carter would like to be able to update him.    Tana Aldana,    [facility]

## 2023-12-01 ENCOUNTER — TELEPHONE (OUTPATIENT)
Dept: FAMILY MEDICINE | Facility: CLINIC | Age: 68
End: 2023-12-01
Payer: COMMERCIAL

## 2023-12-01 DIAGNOSIS — K40.91 UNILATERAL RECURRENT INGUINAL HERNIA WITHOUT OBSTRUCTION OR GANGRENE: Primary | ICD-10-CM

## 2023-12-01 DIAGNOSIS — R05.3 CHRONIC COUGH: Primary | ICD-10-CM

## 2023-12-01 RX ORDER — PREDNISONE 20 MG/1
20 TABLET ORAL 2 TIMES DAILY
Qty: 30 TABLET | Refills: 1 | Status: SHIPPED | OUTPATIENT
Start: 2023-12-01 | End: 2024-03-13

## 2023-12-01 NOTE — TELEPHONE ENCOUNTER
Last night the patient was coughing.  He developed a reducable hernia on the right side.  He will see surgery.  I will increase his prednisone since it seem that this is the only thing that works.  I will have him stop the 10 mg and go to 20 bid until improved then cut back under my direction at that time.  He will continue to take the vibramycin and Singulair. His fasting blood sugars have been under 117.  He will keep track of them and continue the new dose of .5mg weekly. Referral sent for general surgery.

## 2023-12-02 DIAGNOSIS — M25.50 MULTIPLE JOINT PAIN: ICD-10-CM

## 2023-12-02 DIAGNOSIS — E11.9 TYPE 2 DIABETES MELLITUS TREATED WITHOUT INSULIN (H): Primary | ICD-10-CM

## 2023-12-02 DIAGNOSIS — R00.0 ELEVATED PULSE RATE: ICD-10-CM

## 2023-12-04 ENCOUNTER — OFFICE VISIT (OUTPATIENT)
Dept: SURGERY | Facility: CLINIC | Age: 68
End: 2023-12-04
Attending: FAMILY MEDICINE
Payer: COMMERCIAL

## 2023-12-04 ENCOUNTER — LAB (OUTPATIENT)
Dept: LAB | Facility: CLINIC | Age: 68
End: 2023-12-04
Payer: COMMERCIAL

## 2023-12-04 ENCOUNTER — TELEPHONE (OUTPATIENT)
Dept: SURGERY | Facility: CLINIC | Age: 68
End: 2023-12-04

## 2023-12-04 VITALS
SYSTOLIC BLOOD PRESSURE: 154 MMHG | HEART RATE: 96 BPM | HEIGHT: 72 IN | WEIGHT: 213 LBS | BODY MASS INDEX: 28.85 KG/M2 | DIASTOLIC BLOOD PRESSURE: 96 MMHG

## 2023-12-04 DIAGNOSIS — R00.0 ELEVATED PULSE RATE: ICD-10-CM

## 2023-12-04 DIAGNOSIS — K40.91 UNILATERAL RECURRENT INGUINAL HERNIA WITHOUT OBSTRUCTION OR GANGRENE: ICD-10-CM

## 2023-12-04 DIAGNOSIS — E11.9 TYPE 2 DIABETES MELLITUS TREATED WITHOUT INSULIN (H): Primary | ICD-10-CM

## 2023-12-04 DIAGNOSIS — M25.50 MULTIPLE JOINT PAIN: ICD-10-CM

## 2023-12-04 LAB
CREAT UR-MCNC: 117 MG/DL
HBA1C MFR BLD: 8 % (ref 0–5.6)
MICROALBUMIN UR-MCNC: 14.4 MG/L
MICROALBUMIN/CREAT UR: 12.31 MG/G CR (ref 0–17)

## 2023-12-04 PROCEDURE — 86618 LYME DISEASE ANTIBODY: CPT

## 2023-12-04 PROCEDURE — 86038 ANTINUCLEAR ANTIBODIES: CPT

## 2023-12-04 PROCEDURE — 82043 UR ALBUMIN QUANTITATIVE: CPT

## 2023-12-04 PROCEDURE — 84681 ASSAY OF C-PEPTIDE: CPT

## 2023-12-04 PROCEDURE — 86140 C-REACTIVE PROTEIN: CPT

## 2023-12-04 PROCEDURE — 36415 COLL VENOUS BLD VENIPUNCTURE: CPT

## 2023-12-04 PROCEDURE — 99203 OFFICE O/P NEW LOW 30 MIN: CPT | Mod: 57 | Performed by: SURGERY

## 2023-12-04 PROCEDURE — 84443 ASSAY THYROID STIM HORMONE: CPT

## 2023-12-04 PROCEDURE — 83036 HEMOGLOBIN GLYCOSYLATED A1C: CPT

## 2023-12-04 PROCEDURE — 86431 RHEUMATOID FACTOR QUANT: CPT

## 2023-12-04 PROCEDURE — 82570 ASSAY OF URINE CREATININE: CPT

## 2023-12-04 NOTE — LETTER
12/4/2023         RE: Enrico Leos  [id number] [address] [place] [id number]        Dear Colleague,    Thank you for referring your patient, Enrico Leos, to the [facility]. Please see a copy of my visit note below.    Patient seen in consultation for inguinal hernia by Josh Carter    HPI:  Patient is a 68 year old male  with complaints of inguinal hernia on right  Thinks caused from coughing attacks he gets with sinus problems, seeing ENT for this coming up  Noticed the bulge after a bad episode  The patient noticed the symptoms about 1 week ago.    Some pain there with coughing  Can reduce but comes back out  nothing makes the episode better.  Patient has family history of similar problems in Father.  Patient had inguinal hernia repair as a child    Review Of Systems    Skin: negative  Ears/Nose/Throat: sinus issues  Respiratory: Cough and congestion  Cardiovascular: negative  Gastrointestinal: negative  Genitourinary: elevated PSA  Musculoskeletal: as above  Neurologic: negative  Hematologic/Lymphatic/Immunologic: negative  Endocrine: A1C up, started on ozempic. Also currently on prednisone for the sinuses which is elevating the blood sugars      Past Medical History:   Diagnosis Date     Allergic rhinitis, cause unspecified     Allergic rhinitis     Chronic fatigue 9/9/2011     Degenerative joint disease      Diabetes mellitus, type 2 (H) 1/20/2021     History of spinal cord injury low back surgery     History of thrombophlebitis as a infant     Hypertension      Lumbago      Other and unspecified hyperlipidemia      Pure hypercholesterolemia        Past Surgical History:   Procedure Laterality Date     AS REPAIR DETACH RETINA,SCLERAL BUCKLE Left 11/18/2020    VRS Dr Chau     H ARGON LASER FOR RETINAL TEAR Right 04/06/2021    VRS Dr Chau     HC KNEE SCOPE,MED/LAT MENISECTOMY  08/25/2010    WORK COMP, medial      LASIK Bilateral 2009    and 2015     [place] SPINE SURGERY PROCEDURE  UNLISTED       REMOVAL OF SPERM DUCT(S)  1992    Vasectomy     VASECTOMY  26 years ago     ZZC BIOPSY SYNOVIUM KNEE JOINT  1977    Semilunar Cartilage Excision, Knee     ZZC MONZON W/O FACETEC FORAMOT/DSKC 1/2 VRT SEG, LUMBAR  1986    Lami, Lumbar     ZZC STOMACH SURGERY PROCEDURE UNLISTED      infant     ZZHC REPAIR INCISIONAL HERNIA,REDUCIBLE      Hernia Repair, Incisional, Bilateral       Social History     Socioeconomic History     Marital status:      Spouse name: Sheridan     Number of children: 3     Years of education: Not on file     Highest education level: Not on file   Occupational History     Occupation: [employer]     Employer: [employer]   Tobacco Use     Smoking status: Never     Smokeless tobacco: Never   Substance and Sexual Activity     Alcohol use: Yes     Comment: occasionally     Drug use: No     Sexual activity: Yes     Partners: Female   Other Topics Concern     Parent/sibling w/ CABG, MI or angioplasty before 65F 55M? Not Asked   Social History Narrative     Not on file     Social Determinants of Health     Financial Resource Strain: Low Risk  (11/29/2023)    Financial Resource Strain      Within the past 12 months, have you or your family members you live with been unable to get utilities (heat, electricity) when it was really needed?: No   Food Insecurity: Low Risk  (11/29/2023)    Food Insecurity      Within the past 12 months, did you worry that your food would run out before you got money to buy more?: No      Within the past 12 months, did the food you bought just not last and you didn t have money to get more?: No   Transportation Needs: Low Risk  (11/29/2023)    Transportation Needs      Within the past 12 months, has lack of transportation kept you from medical appointments, getting your medicines, non-medical meetings or appointments, work, or from getting things that you need?: No   Physical Activity: Not on file   Stress: Not on file   Social Connections: Not  on file   Interpersonal Safety: Low Risk  (11/29/2023)    Interpersonal Safety      Do you feel physically and emotionally safe where you currently live?: Yes      Within the past 12 months, have you been hit, slapped, kicked or otherwise physically hurt by someone?: No      Within the past 12 months, have you been humiliated or emotionally abused in other ways by your partner or ex-partner?: No   Housing Stability: Low Risk  (11/29/2023)    Housing Stability      Do you have housing? : Yes      Are you worried about losing your housing?: No       Current Outpatient Medications   Medication Sig Dispense Refill     aspirin 81 MG tablet Take 1 tablet by mouth daily.       atorvastatin (LIPITOR) 40 MG tablet TAKE 1 TABLET BY MOUTH EVERY DAY 90 tablet 4     Blood Glucose Monitoring Suppl (ACCU-CHEK GUIDE ME) w/Device KIT USE TO TEST BLOOD SUGAR 4 TIMES DAILY OR AS DIRECTED. 1 kit 3     cetirizine (ZYRTEC) 10 MG tablet Take 10 mg by mouth daily       doxycycline hyclate (VIBRA-TABS) 100 MG tablet Take 1 tablet (100 mg) by mouth 2 times daily 42 tablet 0     dupilumab (DUPIXENT) 300 MG/2ML prefilled syringe Inject 2 mLs (300 mg) Subcutaneous every 14 days (Patient not taking: Reported on 11/14/2023) 4 mL 11     fluticasone (FLONASE) 50 MCG/ACT nasal spray Spray 1 spray into both nostrils daily       fluticasone-salmeterol (ADVAIR DISKUS) 100-50 MCG/ACT inhaler Inhale 1 puff into the lungs every 12 hours 16 each 3     fluticasone-salmeterol (ADVAIR) 250-50 MCG/ACT inhaler Inhale 1 puff into the lungs every 12 hours 60 each 3     levocetirizine (XYZAL) 5 MG tablet Take 1 tablet (5 mg) by mouth every evening 30 tablet 11     losartan (COZAAR) 100 MG tablet TAKE 1 TABLET BY MOUTH EVERY DAY 90 tablet 1     montelukast (SINGULAIR) 10 MG tablet Take 1 tablet (10 mg) by mouth at bedtime 30 tablet 0     montelukast (SINGULAIR) 10 MG tablet Take 1 tablet (10 mg) by mouth At Bedtime 30 tablet 11     omeprazole (PRILOSEC) 40 MG DR  capsule TAKE 1 CAPSULE BY MOUTH EVERY DAY 90 capsule 3     OneTouch Delica Lancets 33G MISC 1 Box 2 times daily 100 each 11     ONETOUCH VERIO IQ test strip USE TO TEST BLOOD SUGARS 2 TIMES DAILY OR AS DIRECTED. 100 strip 23     predniSONE (DELTASONE) 10 MG tablet Take 1 tablet (10 mg) by mouth daily 30 tablet 0     predniSONE (DELTASONE) 20 MG tablet Take 1 tablet (20 mg) by mouth 2 times daily 30 tablet 1     semaglutide (OZEMPIC) 2 MG/3ML pen Inject 0.5 mg Subcutaneous every 7 days 9 mL 3     thin (NO BRAND SPECIFIED) lancets Test blood sugars 4 times daily  To accompany: Blood Glucose Monitor Brands: per insurance. 400 each 0       Medications and history reviewed    Physical exam:  Vitals: BP (!) 154/96   Pulse 96   Ht 1.829 m (6')   Wt 96.6 kg (213 lb)   BMI 28.89 kg/m    BMI= Body mass index is 28.89 kg/m .    Constitutional: healthy, alert, and no distress  Head: Normocephalic. No masses, lesions, tenderness or abnormalities  Gastrointestinal: Abdomen soft, non-tender. BS normal. No masses, organomegaly. Scar across lower abdomen  : Normal external genitalia without lesions and small reducible inguinal hernia on right. Left feels normal.  Musculoskeletal: extremities normal- no gross deformities noted, gait normal, and normal muscle tone  Skin: no suspicious lesions or rashes  Psychiatric: mentation appears normal and affect normal/bright    Assessment:     ICD-10-CM    1. Unilateral recurrent inguinal hernia without obstruction or gangrene  K40.91 Adult General Surg Referral        Plan: Recurrent inguinal hernia though previous repair was open repair as a child.  Offered minimally invasive robotic approach to repair.  Also advised to continue with his plans to consult with ENT for his sinus issues so hopefully that can be in good control by the time are surgery is completed to minimize the amount of sneezing or coughing related to his sinus problems.  Risks of surgery discussed including, but not  limited to bleeding, infection, recurrence, damage to intra-abdominal contents such as nerves, blood vessels, bowel or other organs.  Risks of anesthesia also discussed.  Although mesh is a better long term repair if it gets infected it must be removed. Mesh problems such as fracture, erosion or adhesions are possible.  If there is evidence of an infection at time of surgery it will be cancelled and rescheduled to when well.    Discussed massaging hernia back in and using ice if becomes more painful.  If not able to reduce then go to emergency room.  Will work on scheduling for him.    Austin Lozano MD      Again, thank you for allowing me to participate in the care of your patient.        Sincerely,        Austin Lozano MD

## 2023-12-04 NOTE — PROGRESS NOTES
Patient seen in consultation for inguinal hernia by Josh Carter    HPI:  Patient is a 68 year old male  with complaints of inguinal hernia on right  Thinks caused from coughing attacks he gets with sinus problems, seeing ENT for this coming up  Noticed the bulge after a bad episode  The patient noticed the symptoms about 1 week ago.    Some pain there with coughing  Can reduce but comes back out  nothing makes the episode better.  Patient has family history of similar problems in Father.  Patient had inguinal hernia repair as a child    Review Of Systems    Skin: negative  Ears/Nose/Throat: sinus issues  Respiratory: Cough and congestion  Cardiovascular: negative  Gastrointestinal: negative  Genitourinary: elevated PSA  Musculoskeletal: as above  Neurologic: negative  Hematologic/Lymphatic/Immunologic: negative  Endocrine: A1C up, started on ozempic. Also currently on prednisone for the sinuses which is elevating the blood sugars      Past Medical History:   Diagnosis Date    Allergic rhinitis, cause unspecified     Allergic rhinitis    Chronic fatigue 9/9/2011    Degenerative joint disease     Diabetes mellitus, type 2 (H) 1/20/2021    History of spinal cord injury low back surgery    History of thrombophlebitis as a infant    Hypertension     Lumbago     Other and unspecified hyperlipidemia     Pure hypercholesterolemia        Past Surgical History:   Procedure Laterality Date    AS REPAIR DETACH RETINA,SCLERAL BUCKLE Left 11/18/2020    VRS Dr Chau    H ARGON LASER FOR RETINAL TEAR Right 04/06/2021    VRS Dr Chau    HC KNEE SCOPE,MED/LAT MENISECTOMY  08/25/2010    WORK COMP, medial     LASIK Bilateral 2009    and 2015    [place] SPINE SURGERY PROCEDURE UNLISTED      REMOVAL OF SPERM DUCT(S)  1992    Vasectomy    VASECTOMY  26 years ago    ZZC BIOPSY SYNOVIUM KNEE JOINT  1977    Semilunar Cartilage Excision, Knee    ZZC MONZON W/O FACETEC FORAMOT/DSKC 1/2 VRT SEG, LUMBAR  1986    Lami, Lumbar    ZZC STOMACH  SURGERY PROCEDURE UNLISTED      infant    ZZHC REPAIR INCISIONAL HERNIA,REDUCIBLE      Hernia Repair, Incisional, Bilateral       Social History     Socioeconomic History    Marital status:      Spouse name: Sheridan    Number of children: 3    Years of education: Not on file    Highest education level: Not on file   Occupational History    Occupation: [employer]     Employer: [employer]   Tobacco Use    Smoking status: Never    Smokeless tobacco: Never   Substance and Sexual Activity    Alcohol use: Yes     Comment: occasionally    Drug use: No    Sexual activity: Yes     Partners: Female   Other Topics Concern    Parent/sibling w/ CABG, MI or angioplasty before 65F 55M? Not Asked   Social History Narrative    Not on file     Social Determinants of Health     Financial Resource Strain: Low Risk  (11/29/2023)    Financial Resource Strain     Within the past 12 months, have you or your family members you live with been unable to get utilities (heat, electricity) when it was really needed?: No   Food Insecurity: Low Risk  (11/29/2023)    Food Insecurity     Within the past 12 months, did you worry that your food would run out before you got money to buy more?: No     Within the past 12 months, did the food you bought just not last and you didn t have money to get more?: No   Transportation Needs: Low Risk  (11/29/2023)    Transportation Needs     Within the past 12 months, has lack of transportation kept you from medical appointments, getting your medicines, non-medical meetings or appointments, work, or from getting things that you need?: No   Physical Activity: Not on file   Stress: Not on file   Social Connections: Not on file   Interpersonal Safety: Low Risk  (11/29/2023)    Interpersonal Safety     Do you feel physically and emotionally safe where you currently live?: Yes     Within the past 12 months, have you been hit, slapped, kicked or otherwise physically hurt by someone?: No     Within  the past 12 months, have you been humiliated or emotionally abused in other ways by your partner or ex-partner?: No   Housing Stability: Low Risk  (11/29/2023)    Housing Stability     Do you have housing? : Yes     Are you worried about losing your housing?: No       Current Outpatient Medications   Medication Sig Dispense Refill    aspirin 81 MG tablet Take 1 tablet by mouth daily.      atorvastatin (LIPITOR) 40 MG tablet TAKE 1 TABLET BY MOUTH EVERY DAY 90 tablet 4    Blood Glucose Monitoring Suppl (ACCU-CHEK GUIDE ME) w/Device KIT USE TO TEST BLOOD SUGAR 4 TIMES DAILY OR AS DIRECTED. 1 kit 3    cetirizine (ZYRTEC) 10 MG tablet Take 10 mg by mouth daily      doxycycline hyclate (VIBRA-TABS) 100 MG tablet Take 1 tablet (100 mg) by mouth 2 times daily 42 tablet 0    dupilumab (DUPIXENT) 300 MG/2ML prefilled syringe Inject 2 mLs (300 mg) Subcutaneous every 14 days (Patient not taking: Reported on 11/14/2023) 4 mL 11    fluticasone (FLONASE) 50 MCG/ACT nasal spray Spray 1 spray into both nostrils daily      fluticasone-salmeterol (ADVAIR DISKUS) 100-50 MCG/ACT inhaler Inhale 1 puff into the lungs every 12 hours 16 each 3    fluticasone-salmeterol (ADVAIR) 250-50 MCG/ACT inhaler Inhale 1 puff into the lungs every 12 hours 60 each 3    levocetirizine (XYZAL) 5 MG tablet Take 1 tablet (5 mg) by mouth every evening 30 tablet 11    losartan (COZAAR) 100 MG tablet TAKE 1 TABLET BY MOUTH EVERY DAY 90 tablet 1    montelukast (SINGULAIR) 10 MG tablet Take 1 tablet (10 mg) by mouth at bedtime 30 tablet 0    montelukast (SINGULAIR) 10 MG tablet Take 1 tablet (10 mg) by mouth At Bedtime 30 tablet 11    omeprazole (PRILOSEC) 40 MG DR capsule TAKE 1 CAPSULE BY MOUTH EVERY DAY 90 capsule 3    OneTouch Delica Lancets 33G MISC 1 Box 2 times daily 100 each 11    ONETOUCH VERIO IQ test strip USE TO TEST BLOOD SUGARS 2 TIMES DAILY OR AS DIRECTED. 100 strip 23    predniSONE (DELTASONE) 10 MG tablet Take 1 tablet (10 mg) by mouth daily 30  tablet 0    predniSONE (DELTASONE) 20 MG tablet Take 1 tablet (20 mg) by mouth 2 times daily 30 tablet 1    semaglutide (OZEMPIC) 2 MG/3ML pen Inject 0.5 mg Subcutaneous every 7 days 9 mL 3    thin (NO BRAND SPECIFIED) lancets Test blood sugars 4 times daily  To accompany: Blood Glucose Monitor Brands: per insurance. 400 each 0       Medications and history reviewed    Physical exam:  Vitals: BP (!) 154/96   Pulse 96   Ht 1.829 m (6')   Wt 96.6 kg (213 lb)   BMI 28.89 kg/m    BMI= Body mass index is 28.89 kg/m .    Constitutional: healthy, alert, and no distress  Head: Normocephalic. No masses, lesions, tenderness or abnormalities  Gastrointestinal: Abdomen soft, non-tender. BS normal. No masses, organomegaly. Scar across lower abdomen  : Normal external genitalia without lesions and small reducible inguinal hernia on right. Left feels normal.  Musculoskeletal: extremities normal- no gross deformities noted, gait normal, and normal muscle tone  Skin: no suspicious lesions or rashes  Psychiatric: mentation appears normal and affect normal/bright    Assessment:     ICD-10-CM    1. Unilateral recurrent inguinal hernia without obstruction or gangrene  K40.91 Adult General Surg Referral        Plan: Recurrent inguinal hernia though previous repair was open repair as a child.  Offered minimally invasive robotic approach to repair.  Also advised to continue with his plans to consult with ENT for his sinus issues so hopefully that can be in good control by the time are surgery is completed to minimize the amount of sneezing or coughing related to his sinus problems.  Risks of surgery discussed including, but not limited to bleeding, infection, recurrence, damage to intra-abdominal contents such as nerves, blood vessels, bowel or other organs.  Risks of anesthesia also discussed.  Although mesh is a better long term repair if it gets infected it must be removed. Mesh problems such as fracture, erosion or adhesions are  possible.  If there is evidence of an infection at time of surgery it will be cancelled and rescheduled to when well.    Discussed massaging hernia back in and using ice if becomes more painful.  If not able to reduce then go to emergency room.  Will work on scheduling for him.    Austin Lozano MD

## 2023-12-05 ENCOUNTER — TELEPHONE (OUTPATIENT)
Dept: ALLERGY | Facility: CLINIC | Age: 68
End: 2023-12-05
Payer: COMMERCIAL

## 2023-12-05 DIAGNOSIS — R05.3 CHRONIC COUGH: ICD-10-CM

## 2023-12-05 DIAGNOSIS — J32.4 CHRONIC PANSINUSITIS: Primary | ICD-10-CM

## 2023-12-05 LAB
ANA SER QL IF: NEGATIVE
C PEPTIDE SERPL-MCNC: 9.4 NG/ML (ref 0.9–6.9)
CRP SERPL-MCNC: <3 MG/L
RHEUMATOID FACT SERPL-ACNC: 10 IU/ML
TSH SERPL DL<=0.005 MIU/L-ACNC: 0.31 UIU/ML (ref 0.3–4.2)

## 2023-12-05 NOTE — TELEPHONE ENCOUNTER
This encounter is being sent to inform the clinic that this patient has a referral from Dr. Josh Carter for the diagnoses of Nasal Symptoms and has requested that this patient be seen within 1-2 weeks. Based on the availability of our provider(s), we are unable to accommodate this request.    Were all sites offered this patient?  Yes. Patient's preference is [facility].    Does scheduling algorithm request to schedule next available?  Patient has been scheduled for the first available opening with Dr. Funmilayo Aguayo on 4/15/2024.  We have informed the patient that the clinic will review their referral and reach out if a sooner appointment is medically necessary.

## 2023-12-06 ENCOUNTER — NON-VISIT BILLABLE ENCOUNTER (OUTPATIENT)
Dept: FAMILY MEDICINE | Facility: CLINIC | Age: 68
End: 2023-12-06
Payer: COMMERCIAL

## 2023-12-06 DIAGNOSIS — E11.9 TYPE 2 DIABETES MELLITUS TREATED WITHOUT INSULIN (H): Primary | ICD-10-CM

## 2023-12-06 LAB — B BURGDOR IGG+IGM SER QL: 0.46

## 2023-12-06 RX ORDER — METFORMIN HCL 500 MG
500 TABLET, EXTENDED RELEASE 24 HR ORAL
Qty: 30 TABLET | Refills: 0 | Status: SHIPPED | OUTPATIENT
Start: 2023-12-06 | End: 2023-12-29

## 2023-12-08 ENCOUNTER — MEDICAL CORRESPONDENCE (OUTPATIENT)
Dept: HEALTH INFORMATION MANAGEMENT | Facility: CLINIC | Age: 68
End: 2023-12-08
Payer: COMMERCIAL

## 2023-12-08 ENCOUNTER — TELEPHONE (OUTPATIENT)
Dept: FAMILY MEDICINE | Facility: CLINIC | Age: 68
End: 2023-12-08
Payer: COMMERCIAL

## 2023-12-08 NOTE — TELEPHONE ENCOUNTER
Anyone willing to complete this form for Dr Carter? I can put it in your basket. Thank you.Tenisha FRIED [facility]

## 2023-12-08 NOTE — TELEPHONE ENCOUNTER
Form recieved from [facility]. Form is to request Neurom[employer] external equipment and/or order of accessories replacement.     Placed form in your office.    Tenisha Lange [facility]

## 2023-12-08 NOTE — TELEPHONE ENCOUNTER
This form needs to be signed on quickly.  Can you have someone from the team sign it. They may call me on my cell to answer any questions.

## 2023-12-08 NOTE — TELEPHONE ENCOUNTER
Faced completed form to [employer] [facility] @ [phone].Tenisha FRIED [facility]

## 2023-12-11 ENCOUNTER — TELEPHONE (OUTPATIENT)
Dept: FAMILY MEDICINE | Facility: CLINIC | Age: 68
End: 2023-12-11
Payer: COMMERCIAL

## 2023-12-11 NOTE — TELEPHONE ENCOUNTER
Enrico called to set up an appointment to get re-enrolled into the Prescription Assistance Program for 2024. Informed Enrico that we are currently slammed and are unable to set up an appointment.     We will review his medication list and send completed applications to Enrico and Doctor to review, sign, and send back to me. A letter with instructions and return envelopes will be sent as well. Enrico has been instructed to call us if any questions.     Thank you!    Mercy Macias   Prescription Assistance Assistant

## 2023-12-13 ENCOUNTER — TELEPHONE (OUTPATIENT)
Dept: FAMILY MEDICINE | Facility: CLINIC | Age: 68
End: 2023-12-13
Payer: COMMERCIAL

## 2023-12-13 NOTE — TELEPHONE ENCOUNTER
Patient Quality Outreach    Patient is due for the following:   Diabetes -  Eye Exam and Foot Exam  Physical Annual Wellness Visit      Topic Date Due    COVID-19 Vaccine (1) Never done    Pneumococcal Vaccine (1 - PCV) Never done    Zoster (Shingles) Vaccine (1 of 2) Never done    Diptheria Tetanus Pertussis (DTAP/TDAP/TD) Vaccine (2 - Td or Tdap) 09/24/2022    Flu Vaccine (1) 09/01/2023       Next Steps:   Schedule a Annual Wellness Visit    Type of outreach:    Sent [employer] message.      Questions for provider review:               Isra Johnson MA

## 2023-12-21 DIAGNOSIS — R05.3 CHRONIC COUGH: ICD-10-CM

## 2023-12-21 RX ORDER — MONTELUKAST SODIUM 10 MG/1
1 TABLET ORAL AT BEDTIME
Qty: 90 TABLET | Refills: 1 | Status: SHIPPED | OUTPATIENT
Start: 2023-12-21 | End: 2024-03-13

## 2023-12-28 ENCOUNTER — TELEPHONE (OUTPATIENT)
Dept: ALLERGY | Facility: CLINIC | Age: 68
End: 2023-12-28
Payer: COMMERCIAL

## 2023-12-28 DIAGNOSIS — J33.9 NASAL POLYP: ICD-10-CM

## 2023-12-28 NOTE — TELEPHONE ENCOUNTER
Rx sent via fax to number provided [phone]. Confirmed fax sent successfully.    Shelia MONTAGUE, Specialty RN 12/28/2023 2:14 PM

## 2023-12-28 NOTE — TELEPHONE ENCOUNTER
DATE 12/28/24    Received fax from [email or web address] [facility] requesting a new RX ve faxed to them            PALLAVI Delatorre, [facility]  Specialty Pharmacy Clinic Liaison     [facility] Specialty    [email or web address]     Phone: [phone]  Fax: [phone]

## 2023-12-29 DIAGNOSIS — E11.9 TYPE 2 DIABETES MELLITUS TREATED WITHOUT INSULIN (H): ICD-10-CM

## 2023-12-29 RX ORDER — METFORMIN HCL 500 MG
500 TABLET, EXTENDED RELEASE 24 HR ORAL
Qty: 90 TABLET | Refills: 1 | Status: SHIPPED | OUTPATIENT
Start: 2023-12-29 | End: 2024-03-04

## 2024-01-11 DIAGNOSIS — K21.00 GASTROESOPHAGEAL REFLUX DISEASE WITH ESOPHAGITIS: ICD-10-CM

## 2024-01-11 RX ORDER — OMEPRAZOLE 40 MG/1
CAPSULE, DELAYED RELEASE ORAL
Qty: 90 CAPSULE | Refills: 3 | Status: SHIPPED | OUTPATIENT
Start: 2024-01-11

## 2024-01-12 ENCOUNTER — NON-VISIT BILLABLE ENCOUNTER (OUTPATIENT)
Dept: FAMILY MEDICINE | Facility: CLINIC | Age: 69
End: 2024-01-12
Payer: COMMERCIAL

## 2024-01-12 DIAGNOSIS — E78.2 MIXED HYPERLIPIDEMIA: Primary | ICD-10-CM

## 2024-01-12 RX ORDER — ROSUVASTATIN CALCIUM 20 MG/1
20 TABLET, COATED ORAL DAILY
Qty: 90 TABLET | Refills: 1 | Status: SHIPPED | OUTPATIENT
Start: 2024-01-12 | End: 2024-07-07

## 2024-01-12 NOTE — TELEPHONE ENCOUNTER
Faxed PA approval to PALLAVI Robertson, [facility]  Specialty Pharmacy Clinic Liaison     [facility] Specialty    [email or web address]     Phone: [phone]  Fax: [phone]

## 2024-01-12 NOTE — TELEPHONE ENCOUNTER
PA Initiation    Medication: DUPIXENT 300 MG/2ML SC SOPN  Insurance Company: [employer] [place] - Phone [phone] Fax [phone]  Pharmacy Filling the Rx:    Filling Pharmacy Phone:    Filling Pharmacy Fax:    Start Date: 1/12/2024      Prior Authorization Approval    Medication: DUPIXENT 300 MG/2ML SC SOPN  Authorization Effective Date: 1/12/2024  Authorization Expiration Date: 1/22/2025  Approved Dose/Quantity: 4mL  Reference #: [id number]   Insurance Company: [employer] [place] - Phone [phone] Fax [phone]       PT ON PAP      GEORGE DelatorreS, [facility]  Specialty Pharmacy Clinic Liaison     [facility] Specialty    [email or web address]     Phone: [phone]  Fax: [phone]

## 2024-01-19 NOTE — TELEPHONE ENCOUNTER
[employer] is asking for the form to be revised.     On page 1- letter of medical necessity- They are missing the Reason for request  On page 2-  #3- They are asking for you to select a ICD-10 diagnosis code, or write one in     Placed in providers basket to finish.  Please route to TC when complete    TC: [employer] is asking if you could fax to both numbers:  [phone] & [phone]    Thank you,  Halle CACERES    [phone]

## 2024-01-24 ENCOUNTER — MEDICAL CORRESPONDENCE (OUTPATIENT)
Dept: HEALTH INFORMATION MANAGEMENT | Facility: CLINIC | Age: 69
End: 2024-01-24
Payer: COMMERCIAL

## 2024-01-24 ENCOUNTER — TELEPHONE (OUTPATIENT)
Dept: FAMILY MEDICINE | Facility: CLINIC | Age: 69
End: 2024-01-24
Payer: COMMERCIAL

## 2024-01-24 NOTE — TELEPHONE ENCOUNTER
Acute Care - Physical Therapy Initial Evaluation   Jesu     Patient Name: Cari Harper  : 1937  MRN: [id number]  Today's Date: 2017   Onset of Illness/Injury or Date of Surgery Date: 17  Date of Referral to PT: 17  Referring Physician: Dr. Jacinto      Admit Date: 2017     Visit Dx:    ICD-10-CM ICD-9-CM   1. Viral pneumonia J12.9 480.9   2. Chronic atrial fibrillation I48.2 427.31     Patient Active Problem List   Diagnosis   • Diarrhea   • Respiratory failure, acute   • Viral pneumonia     Past Medical History:   Diagnosis Date   • Anxiety    • Arthritis    • Atrial fibrillation    • Coronary artery disease    • Disease of thyroid gland    • DVT (deep venous thrombosis)    • Hyperlipidemia    • Hypertension      Past Surgical History:   Procedure Laterality Date   • APPENDECTOMY     • CHOLECYSTECTOMY     • COLONOSCOPY W/ BIOPSIES     • EYE SURGERY     • TUBAL ABDOMINAL LIGATION            PT ASSESSMENT (last 72 hours)      PT Evaluation       17 1500 17 1157    Rehab Evaluation    Document Type evaluation  -BC     Subjective Information no complaints;agree to therapy  -BC     Patient Effort, Rehab Treatment good  -BC     General Information    Patient Profile Review yes  -BC     Onset of Illness/Injury or Date of Surgery Date 17  -BC     Referring Physician Dr. Jacinto  -BC     General Observations Pt. supine in bed, awake and alert, no distress noted  -BC     Prior Level of Function independent:  -BC     Equipment Currently Used at Home walker, standard  -BC walker, standard   She has a walker but does not use.  -AC    Living Environment    Lives With alone  -BC alone  -AC    Living Arrangements house   family members live nearby in walking distance.  -BC house  -AC    Type of Financial/Environmental Concern none  -BC none  -AC    Transportation Available car;family or friend will provide  -BC car;family or friend will provide   Her son will provide  Faxed completed form and medical records [facility] [phone]. Sent a copy to be scanned into the chart and placed a copy in the TC form folder.Tenisha Lange [facility]     transporatation at d/c.   -AC    Clinical Impression    Date of Referral to PT 11/21/17  -BC     Criteria for Skilled Therapeutic Interventions Met yes;treatment indicated  -BC     Rehab Potential good, to achieve stated therapy goals  -BC     Predicted Duration of Therapy Intervention (days/wks) LOS  -BC     Pain Assessment    Pain Assessment No/denies pain  -BC     Cognitive Assessment/Intervention    Current Cognitive/Communication Assessment functional  -BC     Orientation Status oriented to;person;place;situation  -BC     Follows Commands/Answers Questions 100% of the time;able to follow single-step instructions;needs increased time  -BC     Personal Safety WNL/WFL  -BC     Personal Safety Interventions fall prevention program maintained;gait belt;muscle strengthening facilitated;nonskid shoes/slippers when out of bed  -BC     ROM (Range of Motion)    General ROM lower extremity range of motion deficits identified  -BC     General ROM Detail WFL  -BC     MMT (Manual Muscle Testing)    General MMT Assessment lower extremity strength deficits identified  -BC     General MMT Assessment Detail BLE MMT grossly 4/5  -BC     Bed Mobility, Assessment/Treatment    Bed Mobility, Assistive Device bed rails  -BC     Bed Mob, Supine to Sit, [place] minimum assist (75% patient effort);2 person assist required  -BC     Bed Mob, Sit to Supine, [place] minimum assist (75% patient effort);2 person assist required  -BC     Transfer Assessment/Treatment    Transfers, Sit-Stand [place] minimum assist (75% patient effort);moderate assist (50% patient effort)  -BC     Transfers, Stand-Sit [place] minimum assist (75% patient effort);moderate assist (50% patient effort)  -BC     Transfers, Sit-Stand-Sit, Assist Device rolling walker  -BC     Gait Assessment/Treatment    Gait, [place] Level minimum assist (75% patient effort);moderate assist (50% patient effort);2 person assist required  -BC     Gait, Assistive  Device rolling walker  -BC     Gait, Distance (Feet) 60  -BC     Positioning and Restraints    Pre-Treatment Position in bed  -BC     Post Treatment Position chair  -BC     In Chair notified nsg;sitting;call light within reach;encouraged to call for assist;with family/caregiver  -BC       11/22/17 0115 11/22/17 0110    General Information    Equipment Currently Used at Home none  -JADE     Living Environment    Lives With  alone  -    Living Arrangements  house  -    Home Accessibility  no concerns  -    Stair Railings at Home  none  -JADE    Type of Financial/Environmental Concern  none  -JADE    Transportation Available  car;family or friend will provide  -      User Key  (r) = Recorded By, (t) = Taken By, (c) = Cosigned By    Initials Name Provider Type     Farshad Kovacs, RN Registered Nurse     Renea Kaufman, RN Registered Nurse    BC Adele Cardona, PT Physical Therapist          Physical Therapy Education     Title: PT OT SLP Therapies (Done)     Topic: Physical Therapy (Done)     Point: Mobility training (Done)    Learning Progress Summary    Learner Readiness Method Response Comment Documented by Status   Patient Acceptance E [facility] 11/22/17 1551 Done               Point: Home exercise program (Done)    Learning Progress Summary    Learner Readiness Method Response Comment Documented by Status   Patient Acceptance E [facility] 11/22/17 1551 Done               Point: Body mechanics (Done)    Learning Progress Summary    Learner Readiness Method Response Comment Documented by Status   Patient Acceptance E [facility] 11/22/17 1551 Done               Point: Precautions (Done)    Learning Progress Summary    Learner Readiness Method Response Comment Documented by Status   Patient Acceptance E [facility] 11/22/17 1551 Done                      User Key     Initials Effective Dates Name Provider Type Discipline    BC 03/14/16 -  Adele Cardona, PT Physical Therapist PT                PT Recommendation and  Plan  Anticipated Equipment Needs At Discharge: front wheeled walker  Planned Therapy Interventions: balance training, bed mobility training, gait training, home exercise program, patient/family education, strengthening, transfer training  PT Frequency: 3-5 times/wk, per priority policy             IP PT Goals       11/22/17 1552          Bed Mobility PT LTG    Bed Mobility PT LTG, Date Established 11/22/17  -BC      Bed Mobility PT LTG, Time to Achieve by discharge  -BC      Bed Mobility PT LTG, Activity Type all bed mobility  -BC      Bed Mobility PT LTG, [place] Level contact guard assist  -BC      Bed Mobility PT Goal  LTG, Assist Device bed rails  -BC      Transfer Training PT LTG    Transfer Training PT LTG, Date Established 11/22/17  -BC      Transfer Training PT LTG, Time to Achieve by discharge  -BC      Transfer Training PT LTG, Activity Type all transfers  -BC      Transfer Training PT LTG, [place] Level contact guard assist  -BC      Transfer Training PT LTG, Assist Device walker, rolling  -BC      Gait Training PT LTG    Gait Training Goal PT LTG, Date Established 11/22/17  -BC      Gait Training Goal PT LTG, Time to Achieve by discharge  -BC      Gait Training Goal PT LTG, Trinity Level contact guard assist  -BC      Gait Training Goal PT LTG, Assist Device walker, rolling  -BC      Gait Training Goal PT LTG, Distance to Achieve 80  -BC        User Key  (r) = Recorded By, (t) = Taken By, (c) = Cosigned By    Initials Name Provider Type    ERICA Cardona, PT Physical Therapist                Outcome Measures       11/22/17 1500          How much help from another person do you currently need...    Turning from your back to your side while in flat bed without using bedrails? 3  -BC      Moving from lying on back to sitting on the side of a flat bed without bedrails? 2  -BC      Moving to and from a bed to a chair (including a wheelchair)? 3  -BC      Standing up from a chair using  your arms (e.g., wheelchair, bedside chair)? 3  -BC      Climbing 3-5 steps with a railing? 2  -BC      To walk in hospital room? 3  -BC      AM-PAC 6 Clicks Score 16  -BC      Functional Assessment    Outcome Measure Options AM-PAC 6 Clicks Basic Mobility (PT)  -BC        User Key  (r) = Recorded By, (t) = Taken By, (c) = Cosigned By    Initials Name Provider Type    ERICA Cardona PT Physical Therapist           Time Calculation:         PT Charges       11/22/17 1555          Time Calculation    Start Time --   60  -BC      PT Received On 11/22/17  -BC      PT Goal Re-Cert Due Date 12/06/17  -BC        User Key  (r) = Recorded By, (t) = Taken By, (c) = Cosigned By    Initials Name Provider Type    ERICA Cardona PT Physical Therapist          Therapy Charges for Today     Code Description Service Date Service Provider Modifiers Qty    87449468284 HC PT MOBILITY CURRENT 11/22/2017 Adele Cardona, PT GP, CK 1    37403259326 HC PT MOBILITY PROJECTED 11/22/2017 Adele Cardona, PT GP, CJ 1    46869036616 HC GAIT TRAINING EA 15 MIN 11/22/2017 Adele Cardona, PT GP 1    28753953768 HC PT EVAL MOD COMPLEXITY 2 11/22/2017 Adele Cardona, PT GP 1    82535929728 HC PT THERAPEUTIC ACT EA 15 MIN 11/22/2017 Adele Cardona, PT GP 1    31235484286 HC PT THER SUPP EA 15 MIN 11/22/2017 Adele Cardona, PT GP 2          PT G-Codes  Outcome Measure Options: AM-PAC 6 Clicks Basic Mobility (PT)  Score: 16  Functional Limitation: Mobility: Walking and moving around  Mobility: Walking and Moving Around Current Status (): At least 40 percent but less than 60 percent impaired, limited or restricted  Mobility: Walking and Moving Around Goal Status (): At least 20 percent but less than 40 percent impaired, limited or restricted      Adele Cardona PT  11/22/2017

## 2024-02-02 ENCOUNTER — TELEPHONE (OUTPATIENT)
Dept: FAMILY MEDICINE | Facility: CLINIC | Age: 69
End: 2024-02-02
Payer: COMMERCIAL

## 2024-02-06 DIAGNOSIS — E11.9 TYPE 2 DIABETES MELLITUS TREATED WITHOUT INSULIN (H): ICD-10-CM

## 2024-02-06 RX ORDER — SEMAGLUTIDE 0.68 MG/ML
INJECTION, SOLUTION SUBCUTANEOUS
Refills: 1 | OUTPATIENT
Start: 2024-02-06

## 2024-02-12 ENCOUNTER — TELEPHONE (OUTPATIENT)
Dept: FAMILY MEDICINE | Facility: CLINIC | Age: 69
End: 2024-02-12
Payer: COMMERCIAL

## 2024-02-12 NOTE — TELEPHONE ENCOUNTER
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 I have enclosed an addressed return envelope for convenience.    I have also mailed to Enrico his section of the [employer] application to review, sign and return to me. I have also sent him postage paid addressed return envelopes.    Thanks so much for your help!    Leonie Arreguin  Prescription Assistance Supervisor  Pharmacy Assistance

## 2024-02-15 NOTE — TELEPHONE ENCOUNTER
Inter-officed back to Leonie Arreguin.Tenisha FRIED [facility]

## 2024-02-15 NOTE — TELEPHONE ENCOUNTER
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 I can get it to you.Tenisha Lange [facility]

## 2024-02-19 ENCOUNTER — TELEPHONE (OUTPATIENT)
Dept: OTOLARYNGOLOGY | Facility: CLINIC | Age: 69
End: 2024-02-19
Payer: COMMERCIAL

## 2024-02-19 NOTE — TELEPHONE ENCOUNTER
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    Please review/complete, sign and return to me. I have included an addressed return interoffice envelope for convenience.    When I receive all required documents, I will submit to the  assistance program. I will note EPIC when a decision is made.    Thanks so much for your help!    Leonie Arreguin  Prescription Assistance Supervisor  Pharmacy Assistance

## 2024-03-04 DIAGNOSIS — E11.9 TYPE 2 DIABETES MELLITUS TREATED WITHOUT INSULIN (H): ICD-10-CM

## 2024-03-04 RX ORDER — METFORMIN HCL 500 MG
1000 TABLET, EXTENDED RELEASE 24 HR ORAL 2 TIMES DAILY WITH MEALS
Qty: 360 TABLET | Refills: 0 | Status: SHIPPED | OUTPATIENT
Start: 2024-03-04 | End: 2024-05-31

## 2024-03-12 NOTE — PROGRESS NOTES
Preoperative Evaluation  [facility]  [address] [facility] [address]  Phone: [phone]  Primary Provider: Brandi Carter  Pre-op Performing Provider: BRANDI CARTER  Mar 13, 2024        Enrico is a 68 year old, presenting for the following:  Pre-Op Exam        3/13/2024     8:00 AM   Additional Questions   Roomed by Jeyson RENEE LPN   Accompanied by Self         3/13/2024     8:00 AM   Patient Reported Additional Medications   Patient reports taking the following new medications None     Surgical Information  Surgery/Procedure: Spinal Cord Stimulator Generator Replacement   Surgery Location: [facility]   Surgeon: Brandi Carpio MD   Surgery Date: 3/22/2024  Time of Surgery: 1245pm  Where patient plans to recover: At home with family  Fax number for surgical facility: [phone]        Subjective       HPI related to upcoming procedure: patient has history of elevated PSA and needs MRI of pelvis. He has a spinal stimulator and need battery changed.           5/5/2021     7:21 AM   Preop Questions   1. Have you ever had a heart attack or stroke? No   2. Have you ever had surgery on your heart or blood vessels, such as a stent placement, a coronary artery bypass, or surgery on an artery in your head, neck, heart, or legs? No   3. Do you have chest pain with activity? No   4. Do you have a history of  heart failure? No   5. Do you currently have a cold, bronchitis or symptoms of other infection? No   6. Do you have a cough, shortness of breath, or wheezing? No   7. Do you or anyone in your family have previous history of blood clots? No   8. Do you or does anyone in your family have a serious bleeding problem such as prolonged bleeding following surgeries or cuts? No   9. Have you ever had problems with anemia or been told to take iron pills? No   10. Have you had any abnormal blood loss such as black, tarry or bloody stools? No   11. Have you ever had a  blood transfusion? No   12. Are you willing to have a blood transfusion if it is medically needed before, during, or after your surgery? Yes   13. Have you or any of your relatives ever had problems with anesthesia? No   14. Do you have sleep apnea, excessive snoring or daytime drowsiness? No   15. Do you have any artifical heart valves or other implanted medical devices like a pacemaker, defibrillator, or continuous glucose monitor? No   16. Do you have artificial joints? No   17. Are you allergic to latex? No     Health Care Directive  Patient does not have a Health Care Directive or Living Will: Discussed advance care planning with patient; information given to patient to review.    Preoperative Review of      reviewed - no record of controlled substances prescribed.       Status of Chronic Conditions:  DIABETES - Patient has a longstanding history of DiabetesType Type II . Patient is being treated with oral agents and denies significant side effects. Control has been higher because he has not been taking Ozempic because of insurance coverage.  He has restarted his metformin and blood sugars over the last 2 weeks are fasting are about 100. Complicating factors include but are not limited to: hypertension and hyperlipidemia.     HYPERLIPIDEMIA - Patient has a long history of significant Hyperlipidemia requiring medication for treatment with recent good control. Patient reports no problems or side effects with the medication.     HYPERTENSION - Patient has longstanding history of HTN , currently denies any symptoms referable to elevated blood pressure. Specifically denies chest pain, palpitations, dyspnea, orthopnea, PND or peripheral edema. Blood pressure readings have been in normal range. Current medication regimen is as listed below. Patient denies any side effects of medication.     Patient Active Problem List    Diagnosis Date Noted    Type 2 diabetes mellitus treated without insulin (H) 01/20/2021      Priority: Medium    Advanced directives, counseling/discussion 09/28/2017     Priority: Medium     Discussed advance care planning with patient; information given to patient to review. 9/24/2012   Mell Jones MA          Post-traumatic osteoarthritis of left knee 11/16/2015     Priority: Medium    Cervical radiculopathy 12/09/2013     Priority: Medium    Cervical radiculitis 09/25/2012     Priority: Medium     FINDINGS: There is normal alignment of the cervical vertebrae.  Vertebral body heights of the cervical spine are well-maintained.  There is increased T2 signal and heterogeneously increased T1 signal  in the anterior aspect of the C5 vertebral body consistent with a  benign hemangioma. Marrow signal throughout the cervical vertebrae is  otherwise within normal limits. There is no evidence for a fracture  or pathologic bony lesion of the cervical spine.    There are minimal posterior broad-based disc bulges at the C3-C4 and  C4-C5 levels. The cervical intervertebral discs are otherwise within  normal limits in height, contour and signal intensity.    The cervical spinal cord is normal in contour and signal intensity.  There is no evidence for intrathecal abnormality.    Level by level:    C2-C3: There is no spinal canal or neural foraminal stenosis at this  level.    C3-C4: There is a posterior broad-based disc osteophyte complex with  a superimposed left lateral (foraminal zone) disc herniation  (protrusion). There is facet arthropathy bilaterally. These findings  result in mild-moderate left foraminal narrowing but no spinal canal  or right foraminal stenosis.    C4-C5: There is no spinal canal or neural foraminal stenosis at this  level.    C5-C6: There is no spinal canal or neural foraminal stenosis at this  level.    C6-C7: There is no spinal canal or neural foraminal stenosis at this  level.    C7-T1: There is no spinal canal or neural foraminal stenosis at this  level.    IMPRESSION:  1. Small left  lateral (foraminal zone) disc herniation (protrusion)  at the C3-C4 level that may result in irritation or impingement upon  the exiting left C4 nerve root.  2. Otherwise, normal cervical spine MRI. No other spinal canal or  neural foraminal stenosis identified.          Chronic constipation 03/28/2012     Priority: Medium    Reflux esophagitis 09/20/2011     Priority: Medium     Overview:   EGD 9/19/2011:( on daily PPI)     - Mild gastritis     - Small to medium hiatal hernia of about 3-4 cm.      - GERD by history   Trial bid PPI  9/19 w/ GI follow-up in one month      Non-cardiac chest pain 09/19/2011     Priority: Medium     Overview:   Angio 9/15/2008: No signif stenoses, EF 60%.   Adenosine Cardiolite 6/22/2010:  negative .  CT coronary arteries 9/19/11: no signif stenoses     - diffuse mild arthersclerosis  All episodes Cp related to anxiety/reflux       GERD (gastroesophageal reflux disease) 09/14/2011     Priority: Medium    Chronic fatigue 09/09/2011     Priority: Medium    Joint ache 09/09/2011     Priority: Medium    Hypertension goal BP (blood pressure) < 140/90 04/16/2011     Priority: Medium    Allergic rhinitis      Priority: Medium     Allergic rhinitis  Problem list name updated by automated process. Provider to review      Hyperlipidemia      Priority: Medium     Problem list name updated by automated process. Provider to review      Degenerative joint disease      Priority: Medium    HYPERLIPIDEMIA LDL GOAL <130 10/31/2010     Priority: Medium    MEDIAL MENISCUS TEAR - left 08/16/2010     Priority: Medium    OA (Osteoarthritis) of Knee - left 08/16/2010     Priority: Medium    Lumbar radiculopathy 02/04/2010     Priority: Medium    Mixed hyperlipidemia 03/11/2002     Priority: Medium      Past Medical History:   Diagnosis Date    Allergic rhinitis, cause unspecified     Allergic rhinitis    Chronic fatigue 9/9/2011    Degenerative joint disease     Diabetes mellitus, type 2 (H) 1/20/2021     History of spinal cord injury low back surgery    History of thrombophlebitis as a infant    Hypertension     Lumbago     Other and unspecified hyperlipidemia     Pure hypercholesterolemia      Past Surgical History:   Procedure Laterality Date    AS REPAIR DETACH RETINA,SCLERAL BUCKLE Left 11/18/2020    VRS Dr Chau    H ARGON LASER FOR RETINAL TEAR Right 04/06/2021    [facility]RS Dr Chau    HC KNEE SCOPE,MED/LAT MENISECTOMY  08/25/2010    WORK COMP, medial     LASIK Bilateral 2009    and 2015    TN SPINE SURGERY PROCEDURE UNLISTED      REMOVAL OF SPERM DUCT(S)  1992    Vasectomy    VASECTOMY  26 years ago    RUST BIOPSY SYNOVIUM KNEE JOINT  1977    Semilunar Cartilage Excision, Knee    ZZC MONZNO W/O FACETEC FORAMOT/DSKC 1/2 VRT SEG, LUMBAR  1986    Lami, Lumbar    ZZC STOMACH SURGERY PROCEDURE UNLISTED      infant    ZZ REPAIR INCISIONAL HERNIA,REDUCIBLE      Hernia Repair, Incisional, Bilateral     Current Outpatient Medications   Medication Sig Dispense Refill    aspirin 81 MG tablet Take 1 tablet by mouth daily.      Blood Glucose Monitoring Suppl (ACCU-CHEK GUIDE ME) w/Device KIT USE TO TEST BLOOD SUGAR 4 TIMES DAILY OR AS DIRECTED. 1 kit 3    cetirizine (ZYRTEC) 10 MG tablet Take 10 mg by mouth daily      levocetirizine (XYZAL) 5 MG tablet Take 1 tablet (5 mg) by mouth every evening 30 tablet 11    losartan (COZAAR) 100 MG tablet TAKE 1 TABLET BY MOUTH EVERY DAY 90 tablet 1    metFORMIN (GLUCOPHAGE XR) 500 MG 24 hr tablet Take 2 tablets (1,000 mg) by mouth 2 times daily (with meals) for 90 days 360 tablet 0    omeprazole (PRILOSEC) 40 MG DR capsule TAKE 1 CAPSULE BY MOUTH EVERY DAY 90 capsule 3    OneTouch Delica Lancets 33G MISC 1 Box 2 times daily 100 each 11    ONETOUCH VERIO IQ test strip USE TO TEST BLOOD SUGARS 2 TIMES DAILY OR AS DIRECTED. 100 strip 23    rosuvastatin (CRESTOR) 20 MG tablet Take 1 tablet (20 mg) by mouth daily 90 tablet 1    thin (NO BRAND SPECIFIED) lancets Test blood sugars 4 times daily  To  accompany: Blood Glucose Monitor Brands: per insurance. 400 each 0    atorvastatin (LIPITOR) 40 MG tablet TAKE 1 TABLET BY MOUTH EVERY DAY (Patient not taking: Reported on 3/13/2024) 90 tablet 4    doxycycline hyclate (VIBRA-TABS) 100 MG tablet Take 1 tablet (100 mg) by mouth 2 times daily (Patient not taking: Reported on 3/13/2024) 42 tablet 0    dupilumab (DUPIXENT) 300 MG/2ML prefilled syringe Inject 2 mLs (300 mg) Subcutaneous every 14 days (Patient not taking: Reported on 3/13/2024) 4 mL 11    fluticasone (FLONASE) 50 MCG/ACT nasal spray Spray 1 spray into both nostrils daily (Patient not taking: Reported on 3/13/2024)      fluticasone-salmeterol (ADVAIR DISKUS) 100-50 MCG/ACT inhaler Inhale 1 puff into the lungs every 12 hours (Patient not taking: Reported on 3/13/2024) 16 each 3    fluticasone-salmeterol (ADVAIR) 250-50 MCG/ACT inhaler Inhale 1 puff into the lungs every 12 hours (Patient not taking: Reported on 3/13/2024) 60 each 3    montelukast (SINGULAIR) 10 MG tablet TAKE 1 TABLET BY MOUTH EVERYDAY AT BEDTIME (Patient not taking: Reported on 3/13/2024) 90 tablet 1    montelukast (SINGULAIR) 10 MG tablet Take 1 tablet (10 mg) by mouth At Bedtime (Patient not taking: Reported on 3/13/2024) 30 tablet 11    predniSONE (DELTASONE) 10 MG tablet Take 1 tablet (10 mg) by mouth daily (Patient not taking: Reported on 3/13/2024) 30 tablet 0    predniSONE (DELTASONE) 20 MG tablet Take 1 tablet (20 mg) by mouth 2 times daily (Patient not taking: Reported on 3/13/2024) 30 tablet 1    semaglutide (OZEMPIC) 2 MG/3ML pen Inject 0.5 mg Subcutaneous every 7 days (Patient not taking: Reported on 3/13/2024) 9 mL 3       Allergies   Allergen Reactions    No Known Drug Allergy         Social History     Tobacco Use    Smoking status: Never    Smokeless tobacco: Never   Substance Use Topics    Alcohol use: Yes     Comment: occasionally     Family History   Problem Relation Age of Onset    Cancer Mother         tumor on leg     Lipids Mother     Macular Degeneration Mother 70        shot tx , hx of smoking 40 yrs ago     Parkinsonism Father     Heart Disease Father         open heart by pass    Prostate Cancer Father     Cancer Maternal Grandmother         lung    Cerebrovascular Disease Paternal Grandmother     Glaucoma No family hx of      History   Drug Use No         Review of Systems    Review of Systems  CONSTITUTIONAL: NEGATIVE for fever, chills, change in weight  INTEGUMENTARY/SKIN: NEGATIVE for worrisome rashes, moles or lesions  EYES: NEGATIVE for vision changes or irritation  ENT/MOUTH: NEGATIVE for ear, mouth and throat problems  RESP: NEGATIVE for significant cough or SOB  BREAST: NEGATIVE for masses, tenderness or discharge  CV: NEGATIVE for chest pain, palpitations or peripheral edema  GI: NEGATIVE for nausea, abdominal pain, heartburn, or change in bowel habits  : NEGATIVE for frequency, dysuria, or hematuria  MUSCULOSKELETAL: NEGATIVE for significant arthralgias or myalgia  NEURO: NEGATIVE for weakness, dizziness or paresthesias  ENDOCRINE: NEGATIVE for temperature intolerance, skin/hair changes  HEME: NEGATIVE for bleeding problems  PSYCHIATRIC: NEGATIVE for changes in mood or affect    Objective    BP (!) 160/88   Pulse 100   Temp 98.4  F (36.9  C) (Tympanic)   Resp 18   Ht 1.829 m (6')   Wt 94.8 kg (209 lb)   SpO2 97%   BMI 28.35 kg/m     Estimated body mass index is 28.35 kg/m  as calculated from the following:    Height as of this encounter: 1.829 m (6').    Weight as of this encounter: 94.8 kg (209 lb).  Physical Exam  GENERAL: alert and no distress  EYES: Eyes grossly normal to inspection, PERRL and conjunctivae and sclerae normal  HENT: ear canals and TM's normal, nose and mouth without ulcers or lesions  NECK: no adenopathy, no asymmetry, masses, or scars  RESP: lungs clear to auscultation - no rales, rhonchi or wheezes  CV: regular rate and rhythm, normal S1 S2, no S3 or S4, no murmur, click or rub, no  peripheral edema  ABDOMEN: soft, nontender, no hepatosplenomegaly, no masses and bowel sounds normal  MS: no gross musculoskeletal defects noted, no edema  SKIN: no suspicious lesions or rashes  NEURO: Normal strength and tone, mentation intact and speech normal  PSYCH: mentation appears normal, affect normal/bright    Recent Labs   Lab Test 12/04/23  1346 10/27/23  0657 06/03/22  0729   HGB  --  15.2  --    PLT  --  287  --    NA  --  138 139   POTASSIUM  --  3.9 4.4   CR  --  0.93 1.08   A1C 8.0* 8.3* 6.0*        Diagnostics  Labs pending at this time.  Results will be reviewed when available.   No EKG required for low risk surgery (cataract, skin procedure, breast biopsy, etc).    Revised Cardiac Risk Index (RCRI)  The patient has the following serious cardiovascular risks for perioperative complications:   - No serious cardiac risks = 0 points     RCRI Interpretation: 0 points: Class I (very low risk - 0.4% complication rate)  Okay for surgery       Signed Electronically by: Josh Carter MD  Copy of this evaluation report is provided to requesting physician.

## 2024-03-12 NOTE — PATIENT INSTRUCTIONS
Preparing for Your Surgery  Getting started  A nurse will call you to review your health history and instructions. They will give you an arrival time based on your scheduled surgery time. Please be ready to share:  Your doctor's clinic name and phone number  Your medical, surgical, and anesthesia history  A list of allergies and sensitivities  A list of medicines, including herbal treatments and over-the-counter drugs  Whether the patient has a legal guardian (ask how to send us the papers in advance)  Please tell us if you're pregnant--or if there's any chance you might be pregnant. Some surgeries may injure a fetus (unborn baby), so they require a pregnancy test. Surgeries that are safe for a fetus don't always need a test, and you can choose whether to have one.   If you have a child who's having surgery, please ask for a copy of Preparing for Your Child's Surgery.    Preparing for surgery  Within 10 to 30 days of surgery: Have a pre-op exam (sometimes called an H&P, or History and Physical). This can be done at a clinic or pre-operative center.  If you're having a , you may not need this exam. Talk to your care team.  At your pre-op exam, talk to your care team about all medicines you take. If you need to stop any medicines before surgery, ask when to start taking them again.  We do this for your safety. Many medicines can make you bleed too much during surgery. Some change how well surgery (anesthesia) drugs work.  Call your insurance company to let them know you're having surgery. (If you don't have insurance, call [phone].)  Call your clinic if there's any change in your health. This includes signs of a cold or flu (sore throat, runny nose, cough, rash, fever). It also includes a scrape or scratch near the surgery site.  If you have questions on the day of surgery, call your hospital or surgery center.  Eating and drinking guidelines  For your safety: Unless your surgeon tells you otherwise,  follow the guidelines below.  Eat and drink as usual until 8 hours before you arrive for surgery. After that, no food or milk.  Drink clear liquids until 2 hours before you arrive. These are liquids you can see through, like water, Gatorade, and Propel Water. They also include plain black coffee and tea (no cream or milk), candy, and breath mints. You can spit out gum when you arrive.  If you drink alcohol: Stop drinking it the night before surgery.  If your care team tells you to take medicine on the morning of surgery, it's okay to take it with a sip of water.  Preventing infection  Shower or bathe the night before and morning of your surgery. Follow the instructions your clinic gave you. (If no instructions, use regular soap.)  Don't shave or clip hair near your surgery site. We'll remove the hair if needed.  Don't smoke or vape the morning of surgery. You may chew nicotine gum up to 2 hours before surgery. A nicotine patch is okay.  Note: Some surgeries require you to completely quit smoking and nicotine. Check with your surgeon.  Your care team will make every effort to keep you safe from infection. We will:  Clean our hands often with soap and water (or an alcohol-based hand rub).  Clean the skin at your surgery site with a special soap that kills germs.  Give you a special gown to keep you warm. (Cold raises the risk of infection.)  Wear special hair covers, masks, gowns and gloves during surgery.  Give antibiotic medicine, if prescribed. Not all surgeries need antibiotics.  What to bring on the day of surgery  Photo ID and insurance card  Copy of your health care directive, if you have one  Glasses and hearing aids (bring cases)  You can't wear contacts during surgery  Inhaler and eye drops, if you use them (tell us about these when you arrive)  CPAP machine or breathing device, if you use them  A few personal items, if spending the night  If you have . . .  A pacemaker, ICD (cardiac defibrillator) or other  implant: Bring the ID card.  An implanted stimulator: Bring the remote control.  A legal guardian: Bring a copy of the certified (court-stamped) guardianship papers.  Please remove any jewelry, including body piercings. Leave jewelry and other valuables at home.  If you're going home the day of surgery  You must have a responsible adult drive you home. They should stay with you overnight as well.  If you don't have someone to stay with you, and you aren't safe to go home alone, we may keep you overnight. Insurance often won't pay for this.  After surgery  If it's hard to control your pain or you need more pain medicine, please call your surgeon's office.  Questions?   If you have any questions for your care team, list them here: _________________________________________________________________________________________________________________________________________________________________________ ____________________________________ ____________________________________ ____________________________________  For informational purposes only. Not to replace the advice of your health care provider. Copyright   2003, 2019 [employer] [facility]. All rights reserved. Clinically reviewed by Patricia Krause MD. SMARTworks 830254 - REV 12/22.    How to Take Your Medication Before Surgery  - Take all of your medications before surgery as usual

## 2024-03-13 ENCOUNTER — OFFICE VISIT (OUTPATIENT)
Dept: FAMILY MEDICINE | Facility: CLINIC | Age: 69
End: 2024-03-13
Payer: COMMERCIAL

## 2024-03-13 VITALS
WEIGHT: 209 LBS | RESPIRATION RATE: 18 BRPM | TEMPERATURE: 98.4 F | HEIGHT: 72 IN | BODY MASS INDEX: 28.31 KG/M2 | DIASTOLIC BLOOD PRESSURE: 70 MMHG | SYSTOLIC BLOOD PRESSURE: 139 MMHG | HEART RATE: 100 BPM | OXYGEN SATURATION: 97 %

## 2024-03-13 DIAGNOSIS — Z01.818 PREOP GENERAL PHYSICAL EXAM: Primary | ICD-10-CM

## 2024-03-13 DIAGNOSIS — Z12.5 SCREENING FOR PROSTATE CANCER: ICD-10-CM

## 2024-03-13 DIAGNOSIS — E11.9 TYPE 2 DIABETES MELLITUS TREATED WITHOUT INSULIN (H): ICD-10-CM

## 2024-03-13 LAB
ALBUMIN SERPL BCG-MCNC: 4.3 G/DL (ref 3.5–5.2)
ALP SERPL-CCNC: 71 U/L (ref 40–150)
ALT SERPL W P-5'-P-CCNC: 23 U/L (ref 0–70)
ANION GAP SERPL CALCULATED.3IONS-SCNC: 14 MMOL/L (ref 7–15)
AST SERPL W P-5'-P-CCNC: 18 U/L (ref 0–45)
BILIRUB SERPL-MCNC: 0.4 MG/DL
BUN SERPL-MCNC: 20.4 MG/DL (ref 8–23)
CALCIUM SERPL-MCNC: 9.7 MG/DL (ref 8.8–10.2)
CHLORIDE SERPL-SCNC: 104 MMOL/L (ref 98–107)
CREAT SERPL-MCNC: 0.92 MG/DL (ref 0.67–1.17)
DEPRECATED HCO3 PLAS-SCNC: 23 MMOL/L (ref 22–29)
EGFRCR SERPLBLD CKD-EPI 2021: >90 ML/MIN/1.73M2
GLUCOSE SERPL-MCNC: 134 MG/DL (ref 70–99)
HBA1C MFR BLD: 8.5 % (ref 0–5.6)
POTASSIUM SERPL-SCNC: 4.2 MMOL/L (ref 3.4–5.3)
PROT SERPL-MCNC: 6.8 G/DL (ref 6.4–8.3)
PSA SERPL DL<=0.01 NG/ML-MCNC: 6.98 NG/ML (ref 0–4.5)
SODIUM SERPL-SCNC: 141 MMOL/L (ref 135–145)

## 2024-03-13 PROCEDURE — 83036 HEMOGLOBIN GLYCOSYLATED A1C: CPT | Performed by: FAMILY MEDICINE

## 2024-03-13 PROCEDURE — 36415 COLL VENOUS BLD VENIPUNCTURE: CPT | Performed by: FAMILY MEDICINE

## 2024-03-13 PROCEDURE — G0103 PSA SCREENING: HCPCS | Performed by: FAMILY MEDICINE

## 2024-03-13 PROCEDURE — 99214 OFFICE O/P EST MOD 30 MIN: CPT | Performed by: FAMILY MEDICINE

## 2024-03-13 PROCEDURE — 80053 COMPREHEN METABOLIC PANEL: CPT | Performed by: FAMILY MEDICINE

## 2024-03-13 RX ORDER — RESPIRATORY SYNCYTIAL VIRUS VACCINE 120MCG/0.5
0.5 KIT INTRAMUSCULAR ONCE
Qty: 1 EACH | Refills: 0 | Status: CANCELLED | OUTPATIENT
Start: 2024-03-13 | End: 2024-03-13

## 2024-03-13 ASSESSMENT — PAIN SCALES - GENERAL: PAINLEVEL: NO PAIN (0)

## 2024-03-18 ENCOUNTER — TELEPHONE (OUTPATIENT)
Dept: OTOLARYNGOLOGY | Facility: CLINIC | Age: 69
End: 2024-03-18
Payer: COMMERCIAL

## 2024-03-18 ENCOUNTER — TELEPHONE (OUTPATIENT)
Dept: FAMILY MEDICINE | Facility: CLINIC | Age: 69
End: 2024-03-18
Payer: COMMERCIAL

## 2024-03-18 NOTE — TELEPHONE ENCOUNTER
I have interoffice mailed-addressed to Devi Novoa RN, Enrico's Dupixent assistance application with his 2022 1040, front and back of his [employer] insurance card, and the front of his Medicare card.    I have sent this to Devi by instruction of my Supervisor, Swathi Torres.    I will be taking care of his Ozempic application.    Thank you!    Leonie Arreguin  Prescription Assistance Supervisor  Pharmacy Assistance

## 2024-03-18 NOTE — TELEPHONE ENCOUNTER
Enrico's Ozempic application has been submitted to the [employer] prescription assistance program.     We will note EPIC when [employer] has made their decision.     Thank you!    Mercy Macias   Prescription Assistance Assistant

## 2024-03-19 ENCOUNTER — OFFICE VISIT (OUTPATIENT)
Dept: ALLERGY | Facility: CLINIC | Age: 69
End: 2024-03-19
Attending: FAMILY MEDICINE
Payer: COMMERCIAL

## 2024-03-19 VITALS — SYSTOLIC BLOOD PRESSURE: 124 MMHG | DIASTOLIC BLOOD PRESSURE: 79 MMHG | OXYGEN SATURATION: 96 % | HEART RATE: 119 BPM

## 2024-03-19 DIAGNOSIS — J32.4 CHRONIC PANSINUSITIS: Primary | ICD-10-CM

## 2024-03-19 DIAGNOSIS — J30.89 OTHER ALLERGIC RHINITIS: ICD-10-CM

## 2024-03-19 PROCEDURE — 99203 OFFICE O/P NEW LOW 30 MIN: CPT | Performed by: ALLERGY & IMMUNOLOGY

## 2024-03-19 PROCEDURE — 86003 ALLG SPEC IGE CRUDE XTRC EA: CPT | Mod: 59 | Performed by: ALLERGY & IMMUNOLOGY

## 2024-03-19 PROCEDURE — 36415 COLL VENOUS BLD VENIPUNCTURE: CPT | Performed by: ALLERGY & IMMUNOLOGY

## 2024-03-19 PROCEDURE — 86003 ALLG SPEC IGE CRUDE XTRC EA: CPT | Performed by: ALLERGY & IMMUNOLOGY

## 2024-03-19 NOTE — PROGRESS NOTES
Enrico Leos is being seen today for allergy shot follow up.    Ordering Provider:   Start date:   Maintenance shot date:  Reaction history:  Premedication requirements:                    Responsiveness to treatment: ***/10 improvement,    Current allergy symptom regime:     Proposed duration of continued maintenance:     Questions/Concerns:    Patient was made aware of continued risk for allergic reaction/anaphylaxis during the duration of treatment.     *** yearly acknowledgment forms have been signed today.
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
## 2024-03-21 NOTE — TELEPHONE ENCOUNTER
Papers were faxed to the [employer] program at RN desk.    CORBY Maki RN 3/21/2024 12:17 PM

## 2024-03-22 LAB
A ALTERNATA IGE QN: <0.1 KU(A)/L
A FUMIGATUS IGE QN: <0.1 KU(A)/L
C HERBARUM IGE QN: <0.1 KU(A)/L
CAT DANDER IGG QN: <0.1 KU(A)/L
CEDAR IGE QN: <0.1 KU(A)/L
COTTONWOOD IGE QN: <0.1 KU(A)/L
D FARINAE IGE QN: <0.1 KU(A)/L
D PTERONYSS IGE QN: <0.1 KU(A)/L
DOG DANDER+EPITH IGE QN: <0.1 KU(A)/L
E PURPURASCENS IGE QN: <0.1 KU(A)/L
ENGL PLANTAIN IGE QN: <0.1 KU(A)/L
FIREBUSH IGE QN: <0.1 KU(A)/L
GIANT RAGWEED IGE QN: <0.1 KU(A)/L
JOHNSON GRASS IGE QN: <0.1 KU(A)/L
WHITE ASH IGE QN: <0.1 KU(A)/L
WHITE ELM IGE QN: <0.1 KU(A)/L

## 2024-03-23 LAB
CALIF WALNUT POLN IGE QN: <0.1 KU(A)/L
COMMON RAGWEED IGE QN: <0.1 KU(A)/L
EAST WHITE PINE IGE QN: <0.1 KU(A)/L
GOOSEFOOT IGE QN: <0.1 KU(A)/L
MAPLE IGE QN: <0.1 KU(A)/L
MUGWORT IGE QN: <0.1 KU(A)/L
NETTLE IGE QN: <0.1 KU(A)/L
P NOTATUM IGE QN: <0.1 KU(A)/L
RED MULBERRY IGE QN: <0.1 KU(A)/L
SALTWORT IGE QN: <0.1 KU(A)/L
SHEEP SORREL IGE QN: <0.1 KU(A)/L
SILVER BIRCH IGE QN: 1.89 KU(A)/L
TIMOTHY IGE QN: <0.1 KU(A)/L
WHITE MULBERRY IGE QN: <0.1 KU(A)/L
WORMWOOD IGE QN: <0.1 KU(A)/L

## 2024-03-26 LAB — WHITE OAK IGE QN: 1 KU(A)/L

## 2024-04-02 ENCOUNTER — MYC MEDICAL ADVICE (OUTPATIENT)
Dept: ALLERGY | Facility: CLINIC | Age: 69
End: 2024-04-02
Payer: COMMERCIAL

## 2024-04-10 ENCOUNTER — PATIENT OUTREACH (OUTPATIENT)
Dept: FAMILY MEDICINE | Facility: CLINIC | Age: 69
End: 2024-04-10
Payer: COMMERCIAL

## 2024-04-10 DIAGNOSIS — J30.89 NON-SEASONAL ALLERGIC RHINITIS DUE TO OTHER ALLERGIC TRIGGER: Primary | ICD-10-CM

## 2024-04-10 RX ORDER — HYDROXYZINE PAMOATE 25 MG/1
25 CAPSULE ORAL 4 TIMES DAILY PRN
Qty: 120 CAPSULE | Refills: 3 | Status: SHIPPED | OUTPATIENT
Start: 2024-04-10 | End: 2024-07-09

## 2024-04-10 NOTE — TELEPHONE ENCOUNTER
Patient Quality Outreach    Patient is due for the following:   Diabetes -  Eye Exam, Diabetic Follow-Up Visit, and Foot Exam  Physical Annual Wellness Visit      Topic Date Due    Pneumococcal Vaccine (1 of 2 - PCV) Never done    Zoster (Shingles) Vaccine (1 of 2) Never done    Diptheria Tetanus Pertussis (DTAP/TDAP/TD) Vaccine (2 - Td or Tdap) 09/24/2022    Flu Vaccine (1) 09/01/2023    COVID-19 Vaccine (1 - 2023-24 season) Never done       Next Steps:   Schedule a Annual Wellness Visit    Type of outreach:    Sent [employer] message.      Questions for provider review:    None           LUCIANA CASTAÑEDA MA

## 2024-04-12 DIAGNOSIS — E11.9 TYPE 2 DIABETES MELLITUS TREATED WITHOUT INSULIN (H): Primary | ICD-10-CM

## 2024-04-16 NOTE — TELEPHONE ENCOUNTER
Please call patient regarding his lab results - [employer] message has not been read. Thanks.

## 2024-04-16 NOTE — TELEPHONE ENCOUNTER
RN spoke with patient regarding lab results. Patient verbalized understanding. No further questions or concerns.         Macy Alvarado RN

## 2024-05-17 DIAGNOSIS — I10 ESSENTIAL HYPERTENSION WITH GOAL BLOOD PRESSURE LESS THAN 140/90: ICD-10-CM

## 2024-05-17 RX ORDER — LOSARTAN POTASSIUM 100 MG/1
TABLET ORAL
Qty: 90 TABLET | Refills: 1 | Status: SHIPPED | OUTPATIENT
Start: 2024-05-17 | End: 2024-10-01

## 2024-05-21 ENCOUNTER — TELEPHONE (OUTPATIENT)
Dept: FAMILY MEDICINE | Facility: CLINIC | Age: 69
End: 2024-05-21
Payer: COMMERCIAL

## 2024-05-21 DIAGNOSIS — R06.2 WHEEZING: ICD-10-CM

## 2024-05-21 RX ORDER — PREDNISONE 20 MG/1
20 TABLET ORAL DAILY
Qty: 10 TABLET | Refills: 2 | Status: SHIPPED | OUTPATIENT
Start: 2024-05-21 | End: 2024-08-09

## 2024-05-28 DIAGNOSIS — J33.9 NASAL POLYP: Primary | ICD-10-CM

## 2024-05-28 DIAGNOSIS — J32.4 CHRONIC PANSINUSITIS: ICD-10-CM

## 2024-05-28 NOTE — TELEPHONE ENCOUNTER
Signed Prescriptions:                        Disp   Refills    dupilumab (DUPIXENT) 300 MG/2ML prefilled *4 mL   3        Sig: Inject 2 mLs (300 mg) Subcutaneous every 14 days  Authorizing Provider: ALEXI AGUILA  Ordering User: SHELIA COY    90 day supply through [employer] and Dupixent [employer].    Shelia Coy RN Care Coordinator, [facility] 05/28/24 10:57 AM

## 2024-05-29 ENCOUNTER — TELEPHONE (OUTPATIENT)
Dept: OTOLARYNGOLOGY | Facility: CLINIC | Age: 69
End: 2024-05-29
Payer: COMMERCIAL

## 2024-05-29 NOTE — TELEPHONE ENCOUNTER
Pharmacy called regarding dupixent order.  Patient had been on prefilled syringe, not auto injector.  Will continue on prefilled syringe.    Shelia Coy RN Care Coordinator, [facility] 05/29/24 11:33 AM

## 2024-05-31 DIAGNOSIS — E11.9 TYPE 2 DIABETES MELLITUS TREATED WITHOUT INSULIN (H): ICD-10-CM

## 2024-05-31 RX ORDER — METFORMIN HCL 500 MG
1000 TABLET, EXTENDED RELEASE 24 HR ORAL 2 TIMES DAILY WITH MEALS
Qty: 360 TABLET | Refills: 0 | Status: SHIPPED | OUTPATIENT
Start: 2024-05-31 | End: 2024-09-01

## 2024-06-11 ENCOUNTER — TELEPHONE (OUTPATIENT)
Dept: FAMILY MEDICINE | Facility: CLINIC | Age: 69
End: 2024-06-11
Payer: COMMERCIAL

## 2024-06-11 DIAGNOSIS — J32.4 CHRONIC PANSINUSITIS: Primary | ICD-10-CM

## 2024-06-11 NOTE — TELEPHONE ENCOUNTER
Dupixent is helping a small bit but still has a thick nasal discharge with cough.  Will continue and trial of antibiotics as well

## 2024-06-13 ENCOUNTER — TELEPHONE (OUTPATIENT)
Dept: FAMILY MEDICINE | Facility: CLINIC | Age: 69
End: 2024-06-13
Payer: COMMERCIAL

## 2024-06-13 NOTE — TELEPHONE ENCOUNTER
Enrico has been approved to the [employer] assistance program for Ozempic through December 2024. He will receive this medication at no cost through the enrollment period. He has been informed of this approval and delivery.    A 120 day supply of Ozempic will be delivered to the [facility] within 7-10 business days. Please contact Enrico to .    Enrico will contact my office for refills as we must work directly with the .  I will note EPIC as each refill is requested.    A detailed letter with refill information will be sent to Enrico.    Thanks so much for your help!    Leonie Arreguin  Prescription Assistance Supervisor  Pharmacy Assistance

## 2024-06-14 ENCOUNTER — TELEPHONE (OUTPATIENT)
Dept: FAMILY MEDICINE | Facility: CLINIC | Age: 69
End: 2024-06-14
Payer: COMMERCIAL

## 2024-06-14 NOTE — TELEPHONE ENCOUNTER
Called and informed patient we received shipment of Ozempic from [employer]. Patient will  Monday, meds in med fridge.          Camron Mejía

## 2024-06-17 PROBLEM — Z71.89 ADVANCED DIRECTIVES, COUNSELING/DISCUSSION: Status: RESOLVED | Noted: 2017-09-28 | Resolved: 2024-06-17

## 2024-06-21 DIAGNOSIS — J32.4 CHRONIC PANSINUSITIS: ICD-10-CM

## 2024-06-21 DIAGNOSIS — J33.9 NASAL POLYP: ICD-10-CM

## 2024-06-21 NOTE — PROGRESS NOTES
Signed Prescriptions:                        Disp   Refills    dupilumab (DUPIXENT) 300 MG/2ML prefilled *4 mL   3        Sig: Inject 2 mLs (300 mg) Subcutaneous every 14 days  Authorizing Provider: ALEXI AGUILA RN Care Coordinator, [facility] 06/21/24 10:16 AM

## 2024-07-02 ENCOUNTER — TELEPHONE (OUTPATIENT)
Dept: FAMILY MEDICINE | Facility: CLINIC | Age: 69
End: 2024-07-02
Payer: COMMERCIAL

## 2024-07-02 NOTE — TELEPHONE ENCOUNTER
"Message from [facility]-semaglutide (OZEMPIC) 2 MG/3ML pen-Drug is now a \"plan Exclusion\".     Insurance will not cover.     Tenisha Lange [facility]        "

## 2024-07-06 DIAGNOSIS — E78.2 MIXED HYPERLIPIDEMIA: ICD-10-CM

## 2024-07-07 RX ORDER — ROSUVASTATIN CALCIUM 20 MG/1
20 TABLET, COATED ORAL DAILY
Qty: 90 TABLET | Refills: 1 | Status: SHIPPED | OUTPATIENT
Start: 2024-07-07

## 2024-07-09 DIAGNOSIS — J30.89 NON-SEASONAL ALLERGIC RHINITIS DUE TO OTHER ALLERGIC TRIGGER: ICD-10-CM

## 2024-07-09 RX ORDER — HYDROXYZINE PAMOATE 25 MG/1
25 CAPSULE ORAL 4 TIMES DAILY PRN
Qty: 360 CAPSULE | Refills: 1 | Status: SHIPPED | OUTPATIENT
Start: 2024-07-09

## 2024-08-03 ENCOUNTER — HEALTH MAINTENANCE LETTER (OUTPATIENT)
Age: 69
End: 2024-08-03

## 2024-08-09 DIAGNOSIS — E11.9 TYPE 2 DIABETES MELLITUS TREATED WITHOUT INSULIN (H): Primary | ICD-10-CM

## 2024-08-09 DIAGNOSIS — R06.2 WHEEZING: ICD-10-CM

## 2024-08-09 RX ORDER — PREDNISONE 20 MG/1
20 TABLET ORAL DAILY
Qty: 10 TABLET | Refills: 2 | Status: SHIPPED | OUTPATIENT
Start: 2024-08-09

## 2024-08-21 DIAGNOSIS — I10 ESSENTIAL HYPERTENSION WITH GOAL BLOOD PRESSURE LESS THAN 140/90: ICD-10-CM

## 2024-08-21 DIAGNOSIS — J32.4 CHRONIC PANSINUSITIS: ICD-10-CM

## 2024-08-21 RX ORDER — LOSARTAN POTASSIUM 100 MG/1
TABLET ORAL
Qty: 90 TABLET | Refills: 1 | OUTPATIENT
Start: 2024-08-21

## 2024-08-26 ENCOUNTER — PATIENT OUTREACH (OUTPATIENT)
Dept: FAMILY MEDICINE | Facility: CLINIC | Age: 69
End: 2024-08-26
Payer: COMMERCIAL

## 2024-08-26 NOTE — TELEPHONE ENCOUNTER
Patient Quality Outreach    Patient is due for the following:   Diabetes -  A1C, Eye Exam, Diabetic Follow-Up Visit, and Foot Exam  Colon Cancer Screening  Physical Annual Wellness Visit      Topic Date Due    COVID-19 Vaccine (1) Never done    Pneumococcal Vaccine (1 of 2 - PCV) Never done    Zoster (Shingles) Vaccine (1 of 2) Never done    Diptheria Tetanus Pertussis (DTAP/TDAP/TD) Vaccine (2 - Td or Tdap) 09/24/2022       Next Steps:   Schedule a Annual Wellness Visit    Type of outreach:    Sent [employer] message.      Questions for provider review:    None           LUCIANA CASTAÑEDA MA

## 2024-09-01 DIAGNOSIS — E11.9 TYPE 2 DIABETES MELLITUS TREATED WITHOUT INSULIN (H): ICD-10-CM

## 2024-09-01 RX ORDER — METFORMIN HCL 500 MG
1000 TABLET, EXTENDED RELEASE 24 HR ORAL 2 TIMES DAILY WITH MEALS
Qty: 360 TABLET | Refills: 0 | Status: SHIPPED | OUTPATIENT
Start: 2024-09-01 | End: 2024-11-30

## 2024-10-01 DIAGNOSIS — I10 ESSENTIAL HYPERTENSION WITH GOAL BLOOD PRESSURE LESS THAN 140/90: ICD-10-CM

## 2024-10-01 DIAGNOSIS — J32.4 CHRONIC PANSINUSITIS: ICD-10-CM

## 2024-10-01 RX ORDER — LOSARTAN POTASSIUM 100 MG/1
TABLET ORAL
Qty: 90 TABLET | Refills: 1 | Status: SHIPPED | OUTPATIENT
Start: 2024-10-01

## 2024-10-12 ENCOUNTER — HEALTH MAINTENANCE LETTER (OUTPATIENT)
Age: 69
End: 2024-10-12

## 2024-10-21 ENCOUNTER — TELEPHONE (OUTPATIENT)
Dept: OTOLARYNGOLOGY | Facility: CLINIC | Age: 69
End: 2024-10-21
Payer: COMMERCIAL

## 2024-10-21 DIAGNOSIS — J33.9 NASAL POLYP: Primary | ICD-10-CM

## 2024-10-21 DIAGNOSIS — J32.4 CHRONIC PANSINUSITIS: ICD-10-CM

## 2024-10-21 NOTE — TELEPHONE ENCOUNTER
Left message with callback number.  Patient must be seen in clinic prior to starting Nucala.    Shelia Coy RN Care Coordinator, [facility] 10/21/24 2:42 PM

## 2024-10-21 NOTE — TELEPHONE ENCOUNTER
Dupixent is up for renewal.    Patient has not been seen by prescriber, LACY Watts, since August 2023.    Patient should be seen by prescriber for follow-up if he would like to continue on Dupixent.    Thank You,     Tenisha Alvarado [facility]  Specialty Pharmacy Clinic [facility]  [email or web address]  [email or web address]  Phone: [phone]  Fax: [phone]

## 2024-10-21 NOTE — TELEPHONE ENCOUNTER
Spoke with patient.  Apparently he stopped his dupixent months ago due to joint pain but only told his PCP.  He is interested in trying Nucala.  Will route message to  for approval.    Shelia Coy RN Care Coordinator, [facility] 10/21/24 12:44 PM

## 2024-10-22 NOTE — TELEPHONE ENCOUNTER
Spoke with patient and made appointment.    Shelia Coy RN Care Coordinator, [facility] 10/22/24 8:47 AM

## 2024-10-24 ENCOUNTER — TELEPHONE (OUTPATIENT)
Dept: FAMILY MEDICINE | Facility: CLINIC | Age: 69
End: 2024-10-24
Payer: COMMERCIAL

## 2024-10-24 DIAGNOSIS — R06.2 WHEEZING: ICD-10-CM

## 2024-10-24 NOTE — TELEPHONE ENCOUNTER
I have interoffice mailed to Dr Carter the Renae Nordisk refill/change form for Enrico's Ozempic.    Please complete, sign and return to me in the enclosed addressed interoffice envelope.    Thanks so much for your help!    Leonie Arreguin  Prescription Assistance Supervisor  Pharmacy Assistance

## 2024-10-25 DIAGNOSIS — E78.2 MIXED HYPERLIPIDEMIA: ICD-10-CM

## 2024-10-25 RX ORDER — ROSUVASTATIN CALCIUM 20 MG/1
20 TABLET, COATED ORAL DAILY
Qty: 90 TABLET | Refills: 1 | OUTPATIENT
Start: 2024-10-25

## 2024-10-30 RX ORDER — PREDNISONE 20 MG/1
20 TABLET ORAL DAILY
Qty: 10 TABLET | Refills: 2 | Status: SHIPPED | OUTPATIENT
Start: 2024-10-30

## 2024-11-06 ENCOUNTER — TELEPHONE (OUTPATIENT)
Dept: FAMILY MEDICINE | Facility: CLINIC | Age: 69
End: 2024-11-06
Payer: COMMERCIAL

## 2024-11-06 NOTE — TELEPHONE ENCOUNTER
A refill request has been made to the [employer] assistance program for Ozempic.    This will arrive to the [facility] within 10-14 BUSINESS days. Please CONTACT Enrico to  medication once arrived.    Thank you!    Mercy Macias   Prescription Assistance Assistant

## 2024-11-13 ENCOUNTER — TELEPHONE (OUTPATIENT)
Dept: FAMILY MEDICINE | Facility: CLINIC | Age: 69
End: 2024-11-13
Payer: COMMERCIAL

## 2024-11-13 NOTE — TELEPHONE ENCOUNTER
Left message on patient's home phone Renae Nordisk PAP medications received today, November 13, 2024 and are available for patient  until 8:00 pm due to Urgent Care hours.    Medications labeled and placed in refrigerator, right hand side, bottom shelf.    Marjan ALVAREZ    [facility]

## 2024-11-14 DIAGNOSIS — J32.4 CHRONIC PANSINUSITIS: ICD-10-CM

## 2024-11-14 DIAGNOSIS — K21.00 GASTROESOPHAGEAL REFLUX DISEASE WITH ESOPHAGITIS: ICD-10-CM

## 2024-11-14 DIAGNOSIS — E78.2 MIXED HYPERLIPIDEMIA: ICD-10-CM

## 2024-11-15 RX ORDER — OMEPRAZOLE 40 MG/1
CAPSULE, DELAYED RELEASE ORAL
Qty: 90 CAPSULE | Refills: 3 | Status: SHIPPED | OUTPATIENT
Start: 2024-11-15

## 2024-11-15 RX ORDER — ROSUVASTATIN CALCIUM 20 MG/1
20 TABLET, COATED ORAL DAILY
Qty: 90 TABLET | Refills: 1 | Status: SHIPPED | OUTPATIENT
Start: 2024-11-15

## 2024-11-23 NOTE — PROGRESS NOTES
Chief Complaint - sinusitis    History of Present Illness - Enrico Leos is a 69 year old male who returns for evaluation of chronic sinusitis with nasal polyps. I first saw him in 2018 for this. The patient describes symptoms of purulent drainage, lots of cough, nasal congestion, wheezing, plugged head. The patient notes some allergies. Treatments have included antibiotics, nasal steroids, nasal saline irrigations, prednisone and oral antihistamines. The treatments seem to not help. No prior history of sinus surgery. I offered surgery and dupixent. He tried Dupixent, but had joint pain so bad he had to stop it. He returns and wishes to try Nucala. He continues to use flonase nasal spray.    He reports he has to go on antibiotics and prednisone often, but after he gets off of it symptoms return. He is so plugged up all the time.     Tests personally reviewed today for this visit:   1.) CT sinus 7/25/2018 showed chronic pansinusitis and nasal polyposis. No acute infection.   2.) Hgb A1C was 6.0 on 6/3/22    Past Medical History -   Patient Active Problem List   Diagnosis    Mixed hyperlipidemia    Lumbar radiculopathy    MEDIAL MENISCUS TEAR - left    OA (Osteoarthritis) of Knee - left    HYPERLIPIDEMIA LDL GOAL <130    Allergic rhinitis    Hyperlipidemia    Degenerative joint disease    Hypertension goal BP (blood pressure) < 140/90    Chronic fatigue    Joint ache    GERD (gastroesophageal reflux disease)    Chronic constipation    Cervical radiculitis    Cervical radiculopathy    Post-traumatic osteoarthritis of left knee    Non-cardiac chest pain    Reflux esophagitis    Type 2 diabetes mellitus treated without insulin (H)       Current Medications -   Current Outpatient Medications:     amoxicillin-clavulanate (AUGMENTIN) 875-125 MG tablet, TAKE 1 TABLET BY MOUTH 2 TIMES DAILY FOR 21 DAYS., Disp: 42 tablet, Rfl: 0    aspirin 81 MG tablet, Take 1 tablet by mouth daily. (Patient not taking: Reported on  3/19/2024), Disp: , Rfl:     Blood Glucose Monitoring Suppl (ACCU-CHEK GUIDE ME) w/Device KIT, USE TO TEST BLOOD SUGAR 4 TIMES DAILY OR AS DIRECTED., Disp: 1 kit, Rfl: 3    cetirizine (ZYRTEC) 10 MG tablet, Take 10 mg by mouth daily, Disp: , Rfl:     dupilumab (DUPIXENT) 300 MG/2ML prefilled pen, Inject 2 mLs (300 mg) Subcutaneous every 14 days, Disp: 4 mL, Rfl: 3    hydrOXYzine sheila (VISTARIL) 25 MG capsule, TAKE 1 CAPSULE (25 MG) BY MOUTH 4 TIMES DAILY AS NEEDED FOR ITCHING, Disp: 360 capsule, Rfl: 1    losartan (COZAAR) 100 MG tablet, TAKE 1 TABLET BY MOUTH EVERY DAY, Disp: 90 tablet, Rfl: 1    mepolizumab (NUCALA) 100 MG/ML auto-injector, Inject 1 mL (100 mg) subcutaneously every 28 days., Disp: 1 mL, Rfl: 5    metFORMIN (GLUCOPHAGE XR) 500 MG 24 hr tablet, TAKE 2 TABLETS (1,000 MG) BY MOUTH 2 TIMES DAILY (WITH MEALS) FOR 90 DAYS, Disp: 360 tablet, Rfl: 0    omeprazole (PRILOSEC) 40 MG DR capsule, TAKE 1 CAPSULE BY MOUTH EVERY DAY, Disp: 90 capsule, Rfl: 3    ONETOUCH VERIO IQ test strip, USE TO TEST BLOOD SUGARS 2 TIMES DAILY OR AS DIRECTED. (Patient not taking: Reported on 3/19/2024), Disp: 100 strip, Rfl: 23    predniSONE (DELTASONE) 20 MG tablet, Take 1 tablet (20 mg) by mouth daily., Disp: 10 tablet, Rfl: 2    rosuvastatin (CRESTOR) 20 MG tablet, TAKE 1 TABLET BY MOUTH EVERY DAY, Disp: 90 tablet, Rfl: 1    semaglutide (OZEMPIC) 2 MG/3ML pen, Inject 0.5 mg Subcutaneous every 7 days, Disp: 9 mL, Rfl: 3    semaglutide (OZEMPIC) 2 MG/3ML pen, Inject 0.5 mg Subcutaneous every 7 days (Patient not taking: Reported on 3/13/2024), Disp: 9 mL, Rfl: 3    thin (NO BRAND SPECIFIED) lancets, Test blood sugars 4 times daily  To accompany: Blood Glucose Monitor Brands: per insurance. (Patient not taking: Reported on 3/19/2024), Disp: 400 each, Rfl: 0    Allergies -   No Known Allergies      Social History -   Social History     Socioeconomic History    Marital status:      Spouse name: Sheridan    Number of children:  3   Occupational History    Occupation: [employer]     Employer: [employer]   Tobacco Use    Smoking status: Never    Smokeless tobacco: Never   Substance and Sexual Activity    Alcohol use: Yes     Comment: occasionally    Drug use: No    Sexual activity: Yes     Partners: Female       Family History -   Family History   Problem Relation Age of Onset    Cancer Mother         tumor on leg    Lipids Mother     Macular Degeneration Mother 70        shot tx , hx of smoking 40 yrs ago     Parkinsonism Father     Heart Disease Father         open heart by pass    Prostate Cancer Father     Cancer Maternal Grandmother         lung    Cerebrovascular Disease Paternal Grandmother     Glaucoma No family hx of        Physical Exam  General - The patient is nontoxic, in no distress. Alert, answers questions and cooperates with examination appropriately.   Neurologic - CN II-XII are intact. No focal neurologic deficits.   Voice and Breathing - The patient was breathing comfortably without the use of accessory muscles. There was no wheezing, stridor, or stertor.  The patients voice was clear and strong.  Eyes - Extraocular movements intact.  Sclera were not icteric or injected, conjunctiva were pink and moist.  Mouth - Examination of the oral cavity showed pink, healthy oral mucosa. No lesions or ulcerations noted.  The tongue was mobile and midline.  Throat - The walls of the oropharynx were smooth, symmetric, and had no lesions or ulcerations.  No postnasal drainage.  The uvula was midline on elevation.   Nose - External contour is symmetric, no gross deflection or scars. Nasal mucosa is pink and moist with clear mucus. The septum was deviated some, turbinates with significant congestion.  No polyps anteriorly, but it is congested and difficult to see more posterior (see nasal endoscopy below). no masses, or purulence noted on examination.  Neck - Soft, non-tender. Palpation of the occipital, submental,  submandibular, internal jugular chain, and supraclavicular nodes did not demonstrate any abnormal lymph nodes or masses. No parotid masses. Palpation of the thyroid was soft and smooth, with no nodules or goiter appreciated.  The trachea was mobile and midline.    PROCEDURE - nasal endoscopy    Anterior rhinoscopy was insufficient to account for the patient's symptoms. To best evaluate the entire nasal cavity, I performed flexible nasal endoscopy. Verbal consent was obtained for this procedure, and the patient agreed and wished to proceed. Color photographs were taken for the permanent medical record.     I first sprayed the nasal cavity bilaterally with a mix of lidocaine and neosynephrine.  I then began on the right side using an adult flexible endoscope.  The septum was deflected to the left and the nasal airway was very tight.  The right middle turbinate and middle meatus were clearly visualized and edematous in appearance with excess mucous secretion coming from the ethmoid infundibulum. He had a polyp in the middle meatus. The sphenoethmoid recess was also abnormal in appearance, with copious secretions and polypoid edema.    I then turned my attention to the left side. The left middle turbinate and middle meatus were clearly visualized and edematous and polypoid in appearance again.  The posterior nasal cavity and sphenoethmoid recess had excess mucous collecting and draining. No pus.  It also had polypoid degeneration of the mucosa.      A/P -     ICD-10-CM    1. Nasal polyp  J33.9 NASAL ENDOSCOPY, DIAGNOSTIC     fluticasone (FLONASE) 50 MCG/ACT nasal spray      2. Chronic pansinusitis  J32.4 NASAL ENDOSCOPY, DIAGNOSTIC     fluticasone (FLONASE) 50 MCG/ACT nasal spray          Enrico Leos is a 69 year old male with chronic sinusitis and nasal polyps.  He is tried and failed numerous medical treatments including antihistamines, nasal steroids, nasal saline irrigations, oral prednisone, antibiotics, etc.  He also tried Dupixent, which helped, but he had to stop secondary to very significant joint pain. Other treatments like antibiotics and steroids help for very short time, but overall have no significant long-lasting impact. Steroids help the most. However, he is diabetic. We discussed options of endoscopic sinus surgery with turbinate reduction and septoplasty vs Nucala. He would like to try Nucala. He will stay on his flonase and nasal saline irrigations. Recheck in 6 months, sooner if this fails.     Skyler Pardo MD  Otolaryngology  [facility]

## 2024-11-24 ENCOUNTER — TRANSFERRED RECORDS (OUTPATIENT)
Dept: HEALTH INFORMATION MANAGEMENT | Facility: CLINIC | Age: 69
End: 2024-11-24
Payer: COMMERCIAL

## 2024-11-26 ENCOUNTER — OFFICE VISIT (OUTPATIENT)
Dept: OTOLARYNGOLOGY | Facility: CLINIC | Age: 69
End: 2024-11-26
Payer: COMMERCIAL

## 2024-11-26 ENCOUNTER — TELEPHONE (OUTPATIENT)
Dept: OTOLARYNGOLOGY | Facility: CLINIC | Age: 69
End: 2024-11-26

## 2024-11-26 VITALS
HEART RATE: 98 BPM | SYSTOLIC BLOOD PRESSURE: 129 MMHG | DIASTOLIC BLOOD PRESSURE: 91 MMHG | RESPIRATION RATE: 16 BRPM | OXYGEN SATURATION: 97 %

## 2024-11-26 DIAGNOSIS — J33.9 NASAL POLYP: Primary | ICD-10-CM

## 2024-11-26 DIAGNOSIS — J32.4 CHRONIC PANSINUSITIS: ICD-10-CM

## 2024-11-26 PROCEDURE — 99214 OFFICE O/P EST MOD 30 MIN: CPT | Mod: 25 | Performed by: OTOLARYNGOLOGY

## 2024-11-26 PROCEDURE — 31231 NASAL ENDOSCOPY DX: CPT | Mod: 52 | Performed by: OTOLARYNGOLOGY

## 2024-11-26 RX ORDER — FLUTICASONE PROPIONATE 50 MCG
2 SPRAY, SUSPENSION (ML) NASAL DAILY
Qty: 16 G | Refills: 11 | Status: SHIPPED | OUTPATIENT
Start: 2024-11-26

## 2024-11-26 NOTE — TELEPHONE ENCOUNTER
PA Initiation    Medication: NUCALA 100 MG/ML SC SOAJ  Insurance Company: Comment:  [employer]  Pharmacy Filling the Rx: [facility] - [place], [place] - [facility] [address]  Filling Pharmacy Phone: [phone]  Filling Pharmacy Fax: [phone]  Start Date: 11/26/2024  ERASTO (Key: [id number])  PA Case ID #: [id number]        Thank You,     Tenisha Alvarado [facility]  Specialty Pharmacy Clinic [facility] Specialty  [email or web address]  [email or web address]  Phone: [phone]  Fax: [phone]

## 2024-11-26 NOTE — TELEPHONE ENCOUNTER
Prior Authorization Approval    Medication: NUCALA 100 MG/ML SC SOAJ  Authorization Effective Date: 8/27/2024  Authorization Expiration Date: 5/25/2025  Approved Dose/Quantity: 1 ML per 28 day supply  Reference #: ERASTO (Key: [id number])   Insurance Company: Comment:  [employer]  Expected CoPay: $ 1,038.72  CoPay Card Available: No    Financial Assistance Needed: Yes - previously on Dupixent PAP  Which Pharmacy is filling the prescription: [facility] - [place], [place] - [facility] [address]   Pharmacy Notified: financial assistance needed?  Patient Notified: Called patient - LVM + sent [employer] message with Med D high copay / FPAP offer        Thank You,     Monet Alvarado CPhT  Specialty Pharmacy Clinic [facility] Specialty  [email or web address]  [email or web address]  Phone: [phone]  Fax: [phone]

## 2024-11-26 NOTE — LETTER
11/26/2024      Enrico Leos  [id number] [address] [place] [id number]      Dear Colleague,    Thank you for referring your patient, Enrico Leos, to the [facility]. Please see a copy of my visit note below.    Chief Complaint - sinusitis    History of Present Illness - Enrico Leos is a 69 year old male who returns for evaluation of chronic sinusitis with nasal polyps. I first saw him in 2018 for this. The patient describes symptoms of purulent drainage, lots of cough, nasal congestion, wheezing, plugged head. The patient notes some allergies. Treatments have included antibiotics, nasal steroids, nasal saline irrigations, prednisone and oral antihistamines. The treatments seem to not help. No prior history of sinus surgery. I offered surgery and dupixent. He tried Dupixent, but had joint pain so bad he had to stop it. He returns and wishes to try Nucala. He continues to use flonase nasal spray.    He reports he has to go on antibiotics and prednisone often, but after he gets off of it symptoms return. He is so plugged up all the time.     Tests personally reviewed today for this visit:   1.) CT sinus 7/25/2018 showed chronic pansinusitis and nasal polyposis. No acute infection.   2.) Hgb A1C was 6.0 on 6/3/22    Past Medical History -   Patient Active Problem List   Diagnosis     Mixed hyperlipidemia     Lumbar radiculopathy     MEDIAL MENISCUS TEAR - left     OA (Osteoarthritis) of Knee - left     HYPERLIPIDEMIA LDL GOAL <130     Allergic rhinitis     Hyperlipidemia     Degenerative joint disease     Hypertension goal BP (blood pressure) < 140/90     Chronic fatigue     Joint ache     GERD (gastroesophageal reflux disease)     Chronic constipation     Cervical radiculitis     Cervical radiculopathy     Post-traumatic osteoarthritis of left knee     Non-cardiac chest pain     Reflux esophagitis     Type 2 diabetes mellitus treated without insulin (H)       Current Medications -   Current  Outpatient Medications:      amoxicillin-clavulanate (AUGMENTIN) 875-125 MG tablet, TAKE 1 TABLET BY MOUTH 2 TIMES DAILY FOR 21 DAYS., Disp: 42 tablet, Rfl: 0     aspirin 81 MG tablet, Take 1 tablet by mouth daily. (Patient not taking: Reported on 3/19/2024), Disp: , Rfl:      Blood Glucose Monitoring Suppl (ACCU-CHEK GUIDE ME) w/Device KIT, USE TO TEST BLOOD SUGAR 4 TIMES DAILY OR AS DIRECTED., Disp: 1 kit, Rfl: 3     cetirizine (ZYRTEC) 10 MG tablet, Take 10 mg by mouth daily, Disp: , Rfl:      dupilumab (DUPIXENT) 300 MG/2ML prefilled pen, Inject 2 mLs (300 mg) Subcutaneous every 14 days, Disp: 4 mL, Rfl: 3     hydrOXYzine sheila (VISTARIL) 25 MG capsule, TAKE 1 CAPSULE (25 MG) BY MOUTH 4 TIMES DAILY AS NEEDED FOR ITCHING, Disp: 360 capsule, Rfl: 1     losartan (COZAAR) 100 MG tablet, TAKE 1 TABLET BY MOUTH EVERY DAY, Disp: 90 tablet, Rfl: 1     mepolizumab (NUCALA) 100 MG/ML auto-injector, Inject 1 mL (100 mg) subcutaneously every 28 days., Disp: 1 mL, Rfl: 5     metFORMIN (GLUCOPHAGE XR) 500 MG 24 hr tablet, TAKE 2 TABLETS (1,000 MG) BY MOUTH 2 TIMES DAILY (WITH MEALS) FOR 90 DAYS, Disp: 360 tablet, Rfl: 0     omeprazole (PRILOSEC) 40 MG DR capsule, TAKE 1 CAPSULE BY MOUTH EVERY DAY, Disp: 90 capsule, Rfl: 3     ONETOUCH VERIO IQ test strip, USE TO TEST BLOOD SUGARS 2 TIMES DAILY OR AS DIRECTED. (Patient not taking: Reported on 3/19/2024), Disp: 100 strip, Rfl: 23     predniSONE (DELTASONE) 20 MG tablet, Take 1 tablet (20 mg) by mouth daily., Disp: 10 tablet, Rfl: 2     rosuvastatin (CRESTOR) 20 MG tablet, TAKE 1 TABLET BY MOUTH EVERY DAY, Disp: 90 tablet, Rfl: 1     semaglutide (OZEMPIC) 2 MG/3ML pen, Inject 0.5 mg Subcutaneous every 7 days, Disp: 9 mL, Rfl: 3     semaglutide (OZEMPIC) 2 MG/3ML pen, Inject 0.5 mg Subcutaneous every 7 days (Patient not taking: Reported on 3/13/2024), Disp: 9 mL, Rfl: 3     thin (NO BRAND SPECIFIED) lancets, Test blood sugars 4 times daily  To accompany: Blood Glucose Monitor  Brands: per insurance. (Patient not taking: Reported on 3/19/2024), Disp: 400 each, Rfl: 0    Allergies -   No Known Allergies      Social History -   Social History     Socioeconomic History     Marital status:      Spouse name: Sheridan     Number of children: 3   Occupational History     Occupation: [employer]aged [employer]     Employer: [employer]   Tobacco Use     Smoking status: Never     Smokeless tobacco: Never   Substance and Sexual Activity     Alcohol use: Yes     Comment: occasionally     Drug use: No     Sexual activity: Yes     Partners: Female       Family History -   Family History   Problem Relation Age of Onset     Cancer Mother         tumor on leg     Lipids Mother      Macular Degeneration Mother 70        shot tx , hx of smoking 40 yrs ago      Parkinsonism Father      Heart Disease Father         open heart by pass     Prostate Cancer Father      Cancer Maternal Grandmother         lung     Cerebrovascular Disease Paternal Grandmother      Glaucoma No family hx of        Physical Exam  General - The patient is nontoxic, in no distress. Alert, answers questions and cooperates with examination appropriately.   Neurologic - CN II-XII are intact. No focal neurologic deficits.   Voice and Breathing - The patient was breathing comfortably without the use of accessory muscles. There was no wheezing, stridor, or stertor.  The patients voice was clear and strong.  Eyes - Extraocular movements intact.  Sclera were not icteric or injected, conjunctiva were pink and moist.  Mouth - Examination of the oral cavity showed pink, healthy oral mucosa. No lesions or ulcerations noted.  The tongue was mobile and midline.  Throat - The walls of the oropharynx were smooth, symmetric, and had no lesions or ulcerations.  No postnasal drainage.  The uvula was midline on elevation.   Nose - External contour is symmetric, no gross deflection or scars. Nasal mucosa is pink and moist with clear mucus. The septum  was deviated some, turbinates with significant congestion.  No polyps anteriorly, but it is congested and difficult to see more posterior (see nasal endoscopy below). no masses, or purulence noted on examination.  Neck - Soft, non-tender. Palpation of the occipital, submental, submandibular, internal jugular chain, and supraclavicular nodes did not demonstrate any abnormal lymph nodes or masses. No parotid masses. Palpation of the thyroid was soft and smooth, with no nodules or goiter appreciated.  The trachea was mobile and midline.    PROCEDURE - nasal endoscopy    Anterior rhinoscopy was insufficient to account for the patient's symptoms. To best evaluate the entire nasal cavity, I performed flexible nasal endoscopy. Verbal consent was obtained for this procedure, and the patient agreed and wished to proceed. Color photographs were taken for the permanent medical record.     I first sprayed the nasal cavity bilaterally with a mix of lidocaine and neosynephrine.  I then began on the right side using an adult flexible endoscope.  The septum was deflected to the left and the nasal airway was very tight.  The right middle turbinate and middle meatus were clearly visualized and edematous in appearance with excess mucous secretion coming from the ethmoid infundibulum. He had a polyp in the middle meatus. The sphenoethmoid recess was also abnormal in appearance, with copious secretions and polypoid edema.    I then turned my attention to the left side. The left middle turbinate and middle meatus were clearly visualized and edematous and polypoid in appearance again.  The posterior nasal cavity and sphenoethmoid recess had excess mucous collecting and draining. No pus.  It also had polypoid degeneration of the mucosa.      A/P -     ICD-10-CM    1. Nasal polyp  J33.9 NASAL ENDOSCOPY, DIAGNOSTIC     fluticasone (FLONASE) 50 MCG/ACT nasal spray      2. Chronic pansinusitis  J32.4 NASAL ENDOSCOPY, DIAGNOSTIC     fluticasone  (FLONASE) 50 MCG/ACT nasal spray          Enrico Leos is a 69 year old male with chronic sinusitis and nasal polyps.  He is tried and failed numerous medical treatments including antihistamines, nasal steroids, nasal saline irrigations, oral prednisone, antibiotics, etc. He also tried Dupixent, which helped, but he had to stop secondary to very significant joint pain. Other treatments like antibiotics and steroids help for very short time, but overall have no significant long-lasting impact. Steroids help the most. However, he is diabetic. We discussed options of endoscopic sinus surgery with turbinate reduction and septoplasty vs Nucala. He would like to try Nucala. He will stay on his flonase and nasal saline irrigations. Recheck in 6 months, sooner if this fails.     Skyler Pardo MD  Otolaryngology  [facility]      Again, thank you for allowing me to participate in the care of your patient.        Sincerely,        Skyler Pardo MD

## 2024-11-27 NOTE — TELEPHONE ENCOUNTER
Patient called to discuss financial assistance for Nucala:    Patient expressed financial hardship    FPAP eligibility:  Household size: 1  Patient to scan to email POI: [id number]    Thank You,     Tenisha Alvarado [facility]  Specialty Pharmacy Clinic Liaison [facility] Specialty  [email or web address]  [email or web address]  Phone: [phone]  Fax: [phone]

## 2024-11-29 NOTE — TELEPHONE ENCOUNTER
Patient emailed Federal tax return filing summary and social security worksheet. (Rather than completed tax return)    Called patient to discuss obtaining copy of tax return rather than filing forms. Left voicemail.    Responded to patient email we cannot use documents he sent. Requested he send copy of 1040-SR.    Thank You,     Tenisha Alvarado CPhT  Specialty Pharmacy Clinic [facility] Specialty  [email or web address]  [email or web address]  Phone: [phone]  Fax: [phone]

## 2024-12-03 NOTE — TELEPHONE ENCOUNTER
Called patient to discuss proof of income. Enrico was very understanding that we need tax return, not filing summary. He plans to email his proof of income, DENISE [phone], today.    Thank You,     Monet Alvarado [facility]  Specialty Pharmacy Clinic [facility] Specialty  [email or web address]  [email or web address]  Phone: [phone]  Fax: [phone]

## 2024-12-03 NOTE — TELEPHONE ENCOUNTER
ELIAN INITIATED    Medication: NUCALA 100 MG/ML SC SOAJ  [facility] [place] Name: FPAP  Date submitted: 12/3/2024 11:52 AM   Foundation Phone:    [facility] [place] Fax: [phone]  Patient only source of income is social security.    Called patient and completed FPAP application.    Recommended patient call clinic and schedule follow-up appointment before PA is due to  2025.    Thank You,     Tenisha Alvarado [facility]  Specialty Pharmacy Clinic [facility] Specialty  [email or web address]  [email or web address]  Phone: [phone]  Fax: [phone]

## 2024-12-04 NOTE — TELEPHONE ENCOUNTER
ELIAN APPROVED    Medication: NUCALA 100 MG/ML SC SOAJ  Amount: $    Foundation Name: FPAP  Foundation Phone:    [facility] Fax: [phone]  Member ID: [id number]  BIN:   PCN:   Group:   Foundation Effective Date: 12/3/2024  Foundation Expiration Date: 12/31/2025  Additional Information: FPAP* APPROVED (NUCALA), PRIMARY INS= ([place]HE[place] PART D) SECONDARY INS= (PLAN NAME). FPAP IS APPROVED EFFECTIVE (12/03/2024) THROUGH (12/31/2025). UPDATED CPS AND PAYER FIELDS.  Patient Notified: Yes - called and spoke to  patient. Provided pharmacy contact info (Phone#[phone])        Thank You,     Tenisha Alvarado [facility]  Specialty Pharmacy Clinic [facility] Specialty  [email or web address]  [email or web address]  Phone: [phone]  Fax: [phone]

## 2024-12-21 ENCOUNTER — HEALTH MAINTENANCE LETTER (OUTPATIENT)
Age: 69
End: 2024-12-21

## 2024-12-30 ENCOUNTER — TELEPHONE (OUTPATIENT)
Dept: FAMILY MEDICINE | Facility: CLINIC | Age: 69
End: 2024-12-30
Payer: COMMERCIAL

## 2024-12-30 NOTE — TELEPHONE ENCOUNTER
Ozempic application has been interofficed to Dr Carter and mailed to Enrico to review, sign, and send back to me.     We will note EPIC when sent to [employer].     Thank you!    Mercy Macias   Prescription

## 2025-02-06 ENCOUNTER — TELEPHONE (OUTPATIENT)
Dept: OTOLARYNGOLOGY | Facility: CLINIC | Age: 70
End: 2025-02-06
Payer: COMMERCIAL

## 2025-02-06 ENCOUNTER — TELEPHONE (OUTPATIENT)
Dept: FAMILY MEDICINE | Facility: CLINIC | Age: 70
End: 2025-02-06
Payer: COMMERCIAL

## 2025-02-06 DIAGNOSIS — J32.4 CHRONIC PANSINUSITIS: Primary | ICD-10-CM

## 2025-02-06 DIAGNOSIS — J01.00 ACUTE MAXILLARY SINUSITIS: ICD-10-CM

## 2025-02-06 DIAGNOSIS — R06.2 WHEEZING: ICD-10-CM

## 2025-02-06 DIAGNOSIS — M54.12 CERVICAL RADICULAR PAIN: ICD-10-CM

## 2025-02-06 RX ORDER — ALBUTEROL SULFATE 90 UG/1
2 INHALANT RESPIRATORY (INHALATION) EVERY 6 HOURS PRN
Qty: 18 G | Refills: 3 | Status: SHIPPED | OUTPATIENT
Start: 2025-02-06

## 2025-02-06 RX ORDER — FLUTICASONE PROPIONATE AND SALMETEROL 250; 50 UG/1; UG/1
1 POWDER RESPIRATORY (INHALATION) EVERY 12 HOURS
Qty: 60 EACH | Refills: 3 | Status: SHIPPED | OUTPATIENT
Start: 2025-02-06

## 2025-02-06 RX ORDER — AZITHROMYCIN 250 MG/1
TABLET, FILM COATED ORAL
Qty: 6 TABLET | Refills: 0 | Status: SHIPPED | OUTPATIENT
Start: 2025-02-06

## 2025-02-06 RX ORDER — PREDNISONE 20 MG/1
20 TABLET ORAL 2 TIMES DAILY
Qty: 10 TABLET | Refills: 0 | Status: SHIPPED | OUTPATIENT
Start: 2025-02-06

## 2025-02-06 NOTE — TELEPHONE ENCOUNTER
Spoke with patient and let him know that Dr. Pardo would like him to stay on the medication for at least 6 months.    Shelia Coy RN Care Coordinator, ENT Specialty Clinic 02/06/25 1:51 PM

## 2025-02-06 NOTE — TELEPHONE ENCOUNTER
Patient is wheezing at night with nasal congestion.  No fever or chills.  Worse this week. He will report is oximeter drops 90 or below.

## 2025-02-06 NOTE — TELEPHONE ENCOUNTER
[facility] Call Center    Phone Message    May a detailed message be left on voicemail: yes     Reason for Call: Other: Patient called and said he was supposed to give an update about the Nucala that he is on, which he is getting another refill in a couple of weeks. He doesn't think that it's helping and they symptoms are still the same. I said I would relay this message and the team would follow up if needed. I did get him scheduled for an apt with Dr. Pardo in May for a follow up.      Action Taken: Message routed to:  Other: ENT    Travel Screening: Not Applicable     Date of Service:

## 2025-02-08 DIAGNOSIS — J30.89 NON-SEASONAL ALLERGIC RHINITIS DUE TO OTHER ALLERGIC TRIGGER: ICD-10-CM

## 2025-02-08 RX ORDER — HYDROXYZINE PAMOATE 25 MG/1
25 CAPSULE ORAL 4 TIMES DAILY PRN
Qty: 360 CAPSULE | Refills: 1 | Status: SHIPPED | OUTPATIENT
Start: 2025-02-08

## 2025-02-18 DIAGNOSIS — E11.9 TYPE 2 DIABETES MELLITUS TREATED WITHOUT INSULIN (H): Primary | ICD-10-CM

## 2025-02-20 ENCOUNTER — TELEPHONE (OUTPATIENT)
Dept: FAMILY MEDICINE | Facility: CLINIC | Age: 70
End: 2025-02-20
Payer: COMMERCIAL

## 2025-02-20 NOTE — TELEPHONE ENCOUNTER
Enrico's Ozempic assistance application has been submitted to the [employer] assistance program.    We will note Epic when we receive a decision.    Thanks so much for your help!    Leonie Arreguin  Prescription Assistance Supervisor  Pharmacy Assistance   Pool: [facility]

## 2025-02-24 ENCOUNTER — PATIENT OUTREACH (OUTPATIENT)
Dept: FAMILY MEDICINE | Facility: CLINIC | Age: 70
End: 2025-02-24
Payer: COMMERCIAL

## 2025-02-24 NOTE — TELEPHONE ENCOUNTER
Patient Quality Outreach    Patient is due for the following:   Diabetes -  A1C, BP Check, and Diabetic Follow-Up Visit  Colon Cancer Screening    Action(s) Taken:   Schedule a office visit for dm check    Type of outreach:    Sent [employer] message.    Questions for provider review:    None           Ama Mercado MA

## 2025-02-26 ENCOUNTER — LAB (OUTPATIENT)
Dept: LAB | Facility: CLINIC | Age: 70
End: 2025-02-26
Payer: COMMERCIAL

## 2025-02-26 DIAGNOSIS — Z12.5 SCREENING FOR PROSTATE CANCER: ICD-10-CM

## 2025-02-26 DIAGNOSIS — E11.9 TYPE 2 DIABETES MELLITUS TREATED WITHOUT INSULIN (H): ICD-10-CM

## 2025-02-26 DIAGNOSIS — I10 ESSENTIAL HYPERTENSION WITH GOAL BLOOD PRESSURE LESS THAN 140/90: ICD-10-CM

## 2025-02-26 LAB
EST. AVERAGE GLUCOSE BLD GHB EST-MCNC: 183 MG/DL
HBA1C MFR BLD: 8 % (ref 0–5.6)

## 2025-02-26 PROCEDURE — 80053 COMPREHEN METABOLIC PANEL: CPT

## 2025-02-26 PROCEDURE — 83036 HEMOGLOBIN GLYCOSYLATED A1C: CPT

## 2025-02-26 PROCEDURE — 36415 COLL VENOUS BLD VENIPUNCTURE: CPT

## 2025-02-26 PROCEDURE — 80061 LIPID PANEL: CPT

## 2025-02-26 PROCEDURE — G0103 PSA SCREENING: HCPCS

## 2025-02-27 LAB
ALBUMIN SERPL BCG-MCNC: 4.4 G/DL (ref 3.5–5.2)
ALP SERPL-CCNC: 85 U/L (ref 40–150)
ALT SERPL W P-5'-P-CCNC: 26 U/L (ref 0–70)
ANION GAP SERPL CALCULATED.3IONS-SCNC: 11 MMOL/L (ref 7–15)
AST SERPL W P-5'-P-CCNC: 22 U/L (ref 0–45)
BILIRUB SERPL-MCNC: 0.6 MG/DL
BUN SERPL-MCNC: 13.2 MG/DL (ref 8–23)
CALCIUM SERPL-MCNC: 9.7 MG/DL (ref 8.8–10.4)
CHLORIDE SERPL-SCNC: 102 MMOL/L (ref 98–107)
CHOLEST SERPL-MCNC: 226 MG/DL
CREAT SERPL-MCNC: 1.03 MG/DL (ref 0.67–1.17)
EGFRCR SERPLBLD CKD-EPI 2021: 80 ML/MIN/1.73M2
FASTING STATUS PATIENT QL REPORTED: YES
FASTING STATUS PATIENT QL REPORTED: YES
GLUCOSE SERPL-MCNC: 123 MG/DL (ref 70–99)
HCO3 SERPL-SCNC: 26 MMOL/L (ref 22–29)
HDLC SERPL-MCNC: 42 MG/DL
LDLC SERPL CALC-MCNC: 130 MG/DL
NONHDLC SERPL-MCNC: 184 MG/DL
POTASSIUM SERPL-SCNC: 4.5 MMOL/L (ref 3.4–5.3)
PROT SERPL-MCNC: 7.2 G/DL (ref 6.4–8.3)
PSA SERPL DL<=0.01 NG/ML-MCNC: 10.1 NG/ML (ref 0–4.5)
SODIUM SERPL-SCNC: 139 MMOL/L (ref 135–145)
TRIGL SERPL-MCNC: 269 MG/DL

## 2025-05-13 DIAGNOSIS — J32.4 CHRONIC PANSINUSITIS: ICD-10-CM

## 2025-05-13 DIAGNOSIS — J33.9 NASAL POLYP: ICD-10-CM

## 2025-05-13 RX ORDER — MEPOLIZUMAB 100 MG/ML
INJECTION, SOLUTION SUBCUTANEOUS
Qty: 1 ML | Refills: 5 | Status: SHIPPED | OUTPATIENT
Start: 2025-05-13

## 2025-05-13 NOTE — TELEPHONE ENCOUNTER
Signed Prescriptions:                        Disp   Refills    NUCALA 100 MG/ML auto-injector             1 mL   5        Sig: INJECT THE CONTENTS OF 1 AUTOINJECTOR PEN UNDER THE           SKIN EVERY 4 WEEKS  Authorizing Provider: ALEXI AGUILA  Ordering User: JUS STORY RN Care Coordinator, ENT Specialty Clinic 05/13/25 9:27 AM

## 2025-05-21 ENCOUNTER — PATIENT OUTREACH (OUTPATIENT)
Dept: FAMILY MEDICINE | Facility: CLINIC | Age: 70
End: 2025-05-21
Payer: COMMERCIAL

## 2025-05-21 NOTE — TELEPHONE ENCOUNTER
Patient Quality Outreach    Patient is due for the following:   Diabetes -  A1C, Eye Exam, Microalbumin, Diabetic Follow-Up Visit, and Foot Exam  Hypertension -  BP check  Physical Annual Wellness Visit      Topic Date Due    COVID-19 Vaccine (1) Never done    Pneumococcal Vaccine (1 of 2 - PCV) Never done    Zoster (Shingles) Vaccine (1 of 2) Never done       Action(s) Taken:   Schedule a Annual Wellness Visit    Type of outreach:    Sent [employer] message.    Questions for provider review:    None         LUCIANA BAEZ MA  Chart routed to None.

## 2025-05-28 ENCOUNTER — OFFICE VISIT (OUTPATIENT)
Dept: FAMILY MEDICINE | Facility: CLINIC | Age: 70
End: 2025-05-28
Payer: COMMERCIAL

## 2025-05-28 VITALS
HEART RATE: 97 BPM | DIASTOLIC BLOOD PRESSURE: 85 MMHG | WEIGHT: 214 LBS | HEIGHT: 72 IN | RESPIRATION RATE: 18 BRPM | SYSTOLIC BLOOD PRESSURE: 125 MMHG | TEMPERATURE: 97 F | OXYGEN SATURATION: 98 % | BODY MASS INDEX: 28.99 KG/M2

## 2025-05-28 DIAGNOSIS — Z12.11 SCREEN FOR COLON CANCER: ICD-10-CM

## 2025-05-28 DIAGNOSIS — Z23 NEED FOR VACCINATION: ICD-10-CM

## 2025-05-28 DIAGNOSIS — Z01.818 PREOP GENERAL PHYSICAL EXAM: ICD-10-CM

## 2025-05-28 DIAGNOSIS — E11.9 TYPE 2 DIABETES MELLITUS TREATED WITHOUT INSULIN (H): Primary | ICD-10-CM

## 2025-05-28 LAB
ANION GAP SERPL CALCULATED.3IONS-SCNC: 12 MMOL/L (ref 7–15)
BUN SERPL-MCNC: 13.8 MG/DL (ref 8–23)
CALCIUM SERPL-MCNC: 9.7 MG/DL (ref 8.8–10.4)
CHLORIDE SERPL-SCNC: 101 MMOL/L (ref 98–107)
CREAT SERPL-MCNC: 0.93 MG/DL (ref 0.67–1.17)
EGFRCR SERPLBLD CKD-EPI 2021: 90 ML/MIN/1.73M2
EST. AVERAGE GLUCOSE BLD GHB EST-MCNC: 171 MG/DL
GLUCOSE SERPL-MCNC: 167 MG/DL (ref 70–99)
HBA1C MFR BLD: 7.6 % (ref 0–5.6)
HCO3 SERPL-SCNC: 24 MMOL/L (ref 22–29)
POTASSIUM SERPL-SCNC: 4.4 MMOL/L (ref 3.4–5.3)
SODIUM SERPL-SCNC: 137 MMOL/L (ref 135–145)

## 2025-05-28 PROCEDURE — 3079F DIAST BP 80-89 MM HG: CPT | Performed by: FAMILY MEDICINE

## 2025-05-28 PROCEDURE — 3074F SYST BP LT 130 MM HG: CPT | Performed by: FAMILY MEDICINE

## 2025-05-28 PROCEDURE — 3051F HG A1C>EQUAL 7.0%<8.0%: CPT | Performed by: FAMILY MEDICINE

## 2025-05-28 PROCEDURE — 80048 BASIC METABOLIC PNL TOTAL CA: CPT | Performed by: FAMILY MEDICINE

## 2025-05-28 PROCEDURE — 83036 HEMOGLOBIN GLYCOSYLATED A1C: CPT | Performed by: FAMILY MEDICINE

## 2025-05-28 PROCEDURE — 1126F AMNT PAIN NOTED NONE PRSNT: CPT | Performed by: FAMILY MEDICINE

## 2025-05-28 PROCEDURE — 36415 COLL VENOUS BLD VENIPUNCTURE: CPT | Performed by: FAMILY MEDICINE

## 2025-05-28 PROCEDURE — 99214 OFFICE O/P EST MOD 30 MIN: CPT | Performed by: FAMILY MEDICINE

## 2025-05-28 ASSESSMENT — PAIN SCALES - GENERAL: PAINLEVEL_OUTOF10: NO PAIN (0)

## 2025-05-28 NOTE — PATIENT INSTRUCTIONS

## 2025-05-28 NOTE — PROGRESS NOTES
Preoperative Evaluation  [facility]  [address] [facility] [address]  Phone: [phone]  Primary Provider: Josh Carter MD  Pre-op Performing Provider: Josh Caretr MD  May 28, 2025              5/27/2025   Surgical Information   What procedure is being done? nasal surgery   Facility or Hospital where procedure/surgery [facility] be performed[facility]   Who is doing the procedure / surgery? Dr edna Gonzales   Date of surgery / procedure: June 12 2025   Time of surgery / procedure: early afternoon   Where do you plan to recover after surgery? at home with family     Fax number for surgical facility: ?      ICD-10-CM    1. Type 2 diabetes mellitus treated without insulin (H)  E11.9 HEMOGLOBIN A1C     Basic metabolic panel  (Ca, Cl, CO2, Creat, Gluc, K, Na, BUN)     HEMOGLOBIN A1C     Basic metabolic panel  (Ca, Cl, CO2, Creat, Gluc, K, Na, BUN)      2. Need for vaccination  Z23       3. Screen for colon cancer  Z12.11       4. Preop general physical exam  Z01.818        PLAN:okay for surgery    Marsha Weston is a 69 year old, presenting for the following:  Pre-Op Exam          5/28/2025     9:03 AM   Additional Questions   Roomed by Lucia YU CMA   Accompanied by ZEE     HPI: chronic sinusitis not responding to medical treatment          5/27/2025   Pre-Op Questionnaire   Have you ever had a heart attack or stroke? No   Have you ever had surgery on your heart or blood vessels, such as a stent placement, a coronary artery bypass, or surgery on an artery in your head, neck, heart, or legs? No   Do you have chest pain with activity? No   Do you have a history of heart failure? No   Do you currently have a cold, bronchitis or symptoms of other infection? No   Do you have a cough, shortness of breath, or wheezing? No   Do you or anyone in your family have previous history of blood clots? No   Do you or does anyone in your family have a serious bleeding problem such  as prolonged bleeding following surgeries or cuts? No   Have you ever had problems with anemia or been told to take iron pills? No   Have you had any abnormal blood loss such as black, tarry or bloody stools? No   Have you ever had a blood transfusion? No   Are you willing to have a blood transfusion if it is medically needed before, during, or after your surgery? (!) NO     Have you or any of your relatives ever had problems with anesthesia? No   Do you have sleep apnea, excessive snoring or daytime drowsiness? No   Do you have any artifical heart valves or other implanted medical devices like a pacemaker, defibrillator, or continuous glucose monitor? No   Do you have artificial joints? No   Are you allergic to latex? No     Advance Care Planning        Preoperative Review of    reviewed - no record of controlled substances prescribed.       Status of Chronic Conditions:  DIABETES - Patient has a longstanding history of DiabetesType Type II . Patient is being treated with oral agents and denies significant side effects. Control has been fair. Complicating factors include but are not limited to: hypertension and hyperlipidemia.     HYPERLIPIDEMIA - Patient has a long history of significant Hyperlipidemia requiring medication for treatment with recent good control. Patient reports no problems or side effects with the medication.     HYPERTENSION - Patient has longstanding history of HTN , currently denies any symptoms referable to elevated blood pressure. Specifically denies chest pain, palpitations, dyspnea, orthopnea, PND or peripheral edema. Blood pressure readings have been in normal range. Current medication regimen is as listed below. Patient denies any side effects of medication.     Patient Active Problem List    Diagnosis Date Noted    Type 2 diabetes mellitus treated without insulin (H) 01/20/2021     Priority: Medium    Post-traumatic osteoarthritis of left knee 11/16/2015     Priority: Medium     Cervical radiculopathy 12/09/2013     Priority: Medium    Cervical radiculitis 09/25/2012     Priority: Medium     FINDINGS: There is normal alignment of the cervical vertebrae.  Vertebral body heights of the cervical spine are well-maintained.  There is increased T2 signal and heterogeneously increased T1 signal  in the anterior aspect of the C5 vertebral body consistent with a  benign hemangioma. Marrow signal throughout the cervical vertebrae is  otherwise within normal limits. There is no evidence for a fracture  or pathologic bony lesion of the cervical spine.    There are minimal posterior broad-based disc bulges at the C3-C4 and  C4-C5 levels. The cervical intervertebral discs are otherwise within  normal limits in height, contour and signal intensity.    The cervical spinal cord is normal in contour and signal intensity.  There is no evidence for intrathecal abnormality.    Level by level:    C2-C3: There is no spinal canal or neural foraminal stenosis at this  level.    C3-C4: There is a posterior broad-based disc osteophyte complex with  a superimposed left lateral (foraminal zone) disc herniation  (protrusion). There is facet arthropathy bilaterally. These findings  result in mild-moderate left foraminal narrowing but no spinal canal  or right foraminal stenosis.    C4-C5: There is no spinal canal or neural foraminal stenosis at this  level.    C5-C6: There is no spinal canal or neural foraminal stenosis at this  level.    C6-C7: There is no spinal canal or neural foraminal stenosis at this  level.    C7-T1: There is no spinal canal or neural foraminal stenosis at this  level.    IMPRESSION:  1. Small left lateral (foraminal zone) disc herniation (protrusion)  at the C3-C4 level that may result in irritation or impingement upon  the exiting left C4 nerve root.  2. Otherwise, normal cervical spine MRI. No other spinal canal or  neural foraminal stenosis identified.          Chronic constipation 03/28/2012      Priority: Medium    Reflux esophagitis 09/20/2011     Priority: Medium     Overview:   EGD 9/19/2011:( on daily PPI)     - Mild gastritis     - Small to medium hiatal hernia of about 3-4 cm.      - GERD by history   Trial bid PPI  9/19 w/ GI follow-up in one month      Non-cardiac chest pain 09/19/2011     Priority: Medium     Overview:   Angio 9/15/2008: No signif stenoses, EF 60%.   Adenosine Cardiolite 6/22/2010:  negative .  CT coronary arteries 9/19/11: no signif stenoses     - diffuse mild arthersclerosis  All episodes Cp related to anxiety/reflux       GERD (gastroesophageal reflux disease) 09/14/2011     Priority: Medium    Chronic fatigue 09/09/2011     Priority: Medium    Joint ache 09/09/2011     Priority: Medium    Hypertension goal BP (blood pressure) < 140/90 04/16/2011     Priority: Medium    Allergic rhinitis      Priority: Medium     Allergic rhinitis  Problem list name updated by automated process. Provider to review      Hyperlipidemia      Priority: Medium     Problem list name updated by automated process. Provider to review      Degenerative joint disease      Priority: Medium    HYPERLIPIDEMIA LDL GOAL <130 10/31/2010     Priority: Medium    MEDIAL MENISCUS TEAR - left 08/16/2010     Priority: Medium    OA (Osteoarthritis) of Knee - left 08/16/2010     Priority: Medium    Lumbar radiculopathy 02/04/2010     Priority: Medium    Mixed hyperlipidemia 03/11/2002     Priority: Medium      Past Medical History:   Diagnosis Date    Allergic rhinitis, cause unspecified     Allergic rhinitis    Chronic fatigue 9/9/2011    Degenerative joint disease     Diabetes mellitus, type 2 (H) 1/20/2021    History of spinal cord injury low back surgery    History of thrombophlebitis as a infant    Hypertension     Lumbago     Other and unspecified hyperlipidemia     Pure hypercholesterolemia      Past Surgical History:   Procedure Laterality Date    AS REPAIR DETACH RETINA,SCLERAL BUCKLE Left 11/18/2020    [facility]  Dr Chau    H ARGON LASER FOR RETINAL TEAR Right 04/06/2021    [facility] Dr Chau    HC KNEE SCOPE,MED/LAT MENISECTOMY  08/25/2010    WORK COMP, medial     LASIK Bilateral 2009    and 2015    VA SPINE SURGERY PROCEDURE UNLISTED      REMOVAL OF SPERM DUCT(S)  1992    Vasectomy    VASECTOMY  26 years ago    Z BIOPSY SYNOVIUM KNEE JOINT  1977    Semilunar Cartilage Excision, Knee    ZZC MONZON W/O FACETEC FORAMOT/DSKC 1/2 VRT SEG, LUMBAR  1986    Lami, Lumbar    ZZC STOMACH SURGERY PROCEDURE UNLISTED      infant    ZZ REPAIR INCISIONAL HERNIA,REDUCIBLE      Hernia Repair, Incisional, Bilateral     Current Outpatient Medications   Medication Sig Dispense Refill    albuterol (PROAIR HFA/PROVENTIL HFA/VENTOLIN HFA) 108 (90 Base) MCG/ACT inhaler Inhale 2 puffs into the lungs every 6 hours as needed for shortness of breath, wheezing or cough. 18 g 3    Blood Glucose Monitoring Suppl (ACCU-CHEK GUIDE ME) w/Device KIT USE TO TEST BLOOD SUGAR 4 TIMES DAILY OR AS DIRECTED. 1 kit 3    cetirizine (ZYRTEC) 10 MG tablet Take 10 mg by mouth daily      dupilumab (DUPIXENT) 300 MG/2ML prefilled pen Inject 2 mLs (300 mg) Subcutaneous every 14 days 4 mL 3    fluticasone (FLONASE) 50 MCG/ACT nasal spray Spray 2 sprays into both nostrils daily. 16 g 11    fluticasone-salmeterol (ADVAIR) 250-50 MCG/ACT inhaler Inhale 1 puff into the lungs every 12 hours. 60 each 3    hydrOXYzine sheila (VISTARIL) 25 MG capsule TAKE 1 CAPSULE (25 MG) BY MOUTH 4 TIMES DAILY AS NEEDED FOR ITCHING 360 capsule 1    losartan (COZAAR) 100 MG tablet TAKE 1 TABLET BY MOUTH EVERY DAY 90 tablet 1    metFORMIN (GLUCOPHAGE XR) 500 MG 24 hr tablet TAKE 2 TABLETS (1,000 MG) BY MOUTH 2 TIMES DAILY (WITH MEALS) FOR 90 DAYS 360 tablet 0    NUCALA 100 MG/ML auto-injector INJECT THE CONTENTS OF 1 AUTOINJECTOR PEN UNDER THE SKIN EVERY 4 WEEKS 1 mL 5    omeprazole (PRILOSEC) 40 MG DR capsule TAKE 1 CAPSULE BY MOUTH EVERY DAY 90 capsule 3    predniSONE (DELTASONE) 20 MG tablet Take 1  "tablet (20 mg) by mouth 2 times daily. 10 tablet 0    predniSONE (DELTASONE) 20 MG tablet Take 1 tablet (20 mg) by mouth daily. 10 tablet 2    rosuvastatin (CRESTOR) 20 MG tablet TAKE 1 TABLET BY MOUTH EVERY DAY 90 tablet 1    semaglutide (OZEMPIC) 2 MG/3ML pen Inject 0.5 mg Subcutaneous every 7 days 9 mL 3       No Known Allergies     Social History     Tobacco Use    Smoking status: Never    Smokeless tobacco: Never   Substance Use Topics    Alcohol use: Not Currently     Comment: occasionally     Family History   Problem Relation Age of Onset    Cancer Mother         tumor on leg    Lipids Mother     Macular Degeneration Mother 70        shot tx , hx of smoking 40 yrs ago     Parkinsonism Father     Heart Disease Father         open heart by pass    Prostate Cancer Father     Cancer Maternal Grandmother         lung    Cerebrovascular Disease Paternal Grandmother     Glaucoma No family hx of      History   Drug Use No             Review of Systems  CONSTITUTIONAL: NEGATIVE for fever, chills, change in weight  INTEGUMENTARY/SKIN: NEGATIVE for worrisome rashes, moles or lesions  EYES: NEGATIVE for vision changes or irritation  ENT/MOUTH: NEGATIVE for ear, mouth and throat problems  RESP: NEGATIVE for significant cough or SOB  BREAST: NEGATIVE for masses, tenderness or discharge  CV: NEGATIVE for chest pain, palpitations or peripheral edema  GI: NEGATIVE for nausea, abdominal pain, heartburn, or change in bowel habits  : NEGATIVE for frequency, dysuria, or hematuria  MUSCULOSKELETAL:arthralgias    NEURO: NEGATIVE for weakness, dizziness or paresthesias  ENDOCRINE: NEGATIVE for temperature intolerance, skin/hair changes  HEME: NEGATIVE for bleeding problems  PSYCHIATRIC: NEGATIVE for changes in mood or affect    Objective    /85   Pulse 97   Temp 97  F (36.1  C) (Tympanic)   Resp 18   Ht 1.829 m (6' 0.01\")   Wt 97.1 kg (214 lb)   SpO2 98%   BMI 29.02 kg/m     Estimated body mass index is 29.02 kg/m  as " "calculated from the following:    Height as of this encounter: 1.829 m (6' 0.01\").    Weight as of this encounter: 97.1 kg (214 lb).  Physical Exam  GENERAL: alert and no distress  EYES: Eyes grossly normal to inspection, PERRL and conjunctivae and sclerae normal  HENT: ear canals and TM's normal, nose and mouth without ulcers or lesions  NECK: no adenopathy, no asymmetry, masses, or scars  RESP: lungs clear to auscultation - no rales, rhonchi or wheezes  CV: regular rate and rhythm, normal S1 S2, no S3 or S4, no murmur, click or rub, no peripheral edema  ABDOMEN: soft, nontender, no hepatosplenomegaly, no masses and bowel sounds normal  MS: no gross musculoskeletal defects noted, no edema  SKIN: no suspicious lesions or rashes  NEURO: Normal strength and tone, mentation intact and speech normal  PSYCH: mentation appears normal, affect normal/bright    Recent Labs   Lab Test 02/26/25  1045      POTASSIUM 4.5   CR 1.03   A1C 7.6        Diagnostics  Recent Results (from the past 24 hours)   HEMOGLOBIN A1C    Collection Time: 05/28/25  9:24 AM   Result Value Ref Range    Estimated Average Glucose 171 (H) <117 mg/dL    Hemoglobin A1C 7.6 (H) 0.0 - 5.6 %      No EKG required, no history of coronary heart disease, significant arrhythmia, peripheral arterial disease or other structural heart disease.    Revised Cardiac Risk Index (RCRI)  The patient has the following serious cardiovascular risks for perioperative complications:   - No serious cardiac risks = 0 points     RCRI Interpretation: 0 points: Class I (very low risk - 0.4% complication rate)         Signed Electronically by: Josh Carter MD  A copy of this evaluation report is provided to the requesting physician.          "

## 2025-05-31 DIAGNOSIS — R06.2 WHEEZING: ICD-10-CM

## 2025-06-01 RX ORDER — FLUTICASONE PROPIONATE AND SALMETEROL 250; 50 UG/1; UG/1
1 POWDER RESPIRATORY (INHALATION) EVERY 12 HOURS
Qty: 60 EACH | Refills: 3 | Status: SHIPPED | OUTPATIENT
Start: 2025-06-01

## 2025-06-02 ENCOUNTER — TELEPHONE (OUTPATIENT)
Dept: OTOLARYNGOLOGY | Facility: CLINIC | Age: 70
End: 2025-06-02
Payer: COMMERCIAL

## 2025-06-02 NOTE — TELEPHONE ENCOUNTER
PA Needed    Medication: Nucala 100mg/ml soaj  QTY/DS: 1 per 28 days   NEW INS: no   Insurance Company:  [employer] Part D   Pharmacy Filling the Rx: [facility] - [place], [place] - [facility] [address]  PA : 2025  Date of last fill: 2025

## 2025-06-02 NOTE — TELEPHONE ENCOUNTER
PA Initiation    Medication: NUCALA 100 MG/ML SC SOAJ  Insurance Company: [employer] [place] - Phone [phone] Fax [phone]:  ANTHEM PART D  Pharmacy Filling the Rx: [facility] - [place], [place] - [address]  Filling Pharmacy Phone: [phone]  Filling Pharmacy Fax: [phone]  Start Date: 6/2/2025  ERASTO (Key: [id number]) - [id number]    Sent patient message requesting report if he has had improvement in symptoms for submission to insurance.    Thank You,     Monet Alvarado CPhT  Specialty Pharmacy Clinic [facility] Specialty  [email or web address]  [email or web address]  Phone: [phone]  Fax: [phone]

## 2025-06-05 NOTE — TELEPHONE ENCOUNTER
Called patient: patient reports he has not had improvement on Nucala, and in addition is experiencing side effects. Patient does not plan on continuing Nucala.    Thank You,     Tenisha Alvarado [facility]  Specialty Pharmacy Clinic Liaison [facility] Specialty  [email or web address]  [email or web address]  Phone: [phone]  Fax: [phone]

## 2025-06-05 NOTE — TELEPHONE ENCOUNTER
Spoke with patient who says that he is not liking the medications he has tried such as Dupixent and Nucala.   They have caused him extreme joint pain but he is unsure if this is caused by the combination with Ozempic. He is booked with another ENT surgeon for sinus surgery.  Writer inquired as to why patient is seeing another provider, and he stated that someone he knew gave him a referral and everything was already approved so he didn't want to switch back to [facility].  Writer informed patient that even with sinus surgery, he may still require steroids or other medications  afterwards.  Patient verbalized understanding.    Shelia Coy RN Care Coordinator, ENT Specialty Clinic 06/05/25 3:17 PM

## 2025-06-29 DIAGNOSIS — E78.2 MIXED HYPERLIPIDEMIA: ICD-10-CM

## 2025-06-29 RX ORDER — ROSUVASTATIN CALCIUM 20 MG/1
20 TABLET, COATED ORAL DAILY
Qty: 90 TABLET | Refills: 1 | Status: SHIPPED | OUTPATIENT
Start: 2025-06-29

## 2025-07-02 ENCOUNTER — TRANSFERRED RECORDS (OUTPATIENT)
Dept: HEALTH INFORMATION MANAGEMENT | Facility: CLINIC | Age: 70
End: 2025-07-02
Payer: COMMERCIAL

## 2025-07-07 DIAGNOSIS — J32.4 CHRONIC PANSINUSITIS: ICD-10-CM

## 2025-07-07 DIAGNOSIS — J33.9 NASAL POLYP: ICD-10-CM

## 2025-07-07 RX ORDER — FLUTICASONE PROPIONATE 50 MCG
2 SPRAY, SUSPENSION (ML) NASAL DAILY
Qty: 9.9 ML | Refills: 0 | Status: SHIPPED | OUTPATIENT
Start: 2025-07-07

## 2025-07-07 NOTE — CONFIDENTIAL NOTE
Signed Prescriptions:                        Disp   Refills    fluticasone (FLONASE) 50 MCG/ACT nasal spr*9.9 mL 0        Sig: INSTILL 2 SPRAYS INTO BOTH NOSTRILS DAILY  Authorizing Provider: ALEXI AGUILA  Ordering User: SHERIDAN LOPEZ    Refilled prescription per clinic protocol.       Sheridan Lopez RN on 7/7/2025 at 10:47 AM

## 2025-07-11 ENCOUNTER — TRANSFERRED RECORDS (OUTPATIENT)
Dept: HEALTH INFORMATION MANAGEMENT | Facility: CLINIC | Age: 70
End: 2025-07-11
Payer: COMMERCIAL

## 2025-07-11 LAB
ALBUMIN/CREATININE RATIO: 3 MG/G
GFR ESTIMATED (EXTERNAL): >=60 ML/MIN/1.73M2

## 2025-08-06 ENCOUNTER — PATIENT OUTREACH (OUTPATIENT)
Dept: FAMILY MEDICINE | Facility: CLINIC | Age: 70
End: 2025-08-06
Payer: COMMERCIAL